# Patient Record
Sex: MALE | Race: BLACK OR AFRICAN AMERICAN | NOT HISPANIC OR LATINO | Employment: OTHER | URBAN - METROPOLITAN AREA
[De-identification: names, ages, dates, MRNs, and addresses within clinical notes are randomized per-mention and may not be internally consistent; named-entity substitution may affect disease eponyms.]

---

## 2017-01-20 ENCOUNTER — TELEPHONE (OUTPATIENT)
Dept: FAMILY MEDICINE | Facility: CLINIC | Age: 67
End: 2017-01-20

## 2017-01-20 DIAGNOSIS — Z91.89 PNEUMOCOCCAL VACCINATION INDICATED: Primary | ICD-10-CM

## 2017-01-20 NOTE — TELEPHONE ENCOUNTER
----- Message from Anamika Hernandez LPN sent at 1/20/2017 10:24 AM CST -----  Contact: self  Patient has not received any of the pneumonia vaccine.  ----- Message -----     From: Cinda Sorenson     Sent: 1/19/2017   8:30 AM       To: Angus YUAN Staff    Pt calling to schedule a pneumonia injection. Please call [phone].

## 2017-01-20 NOTE — TELEPHONE ENCOUNTER
Spoke with wife and she informed me that she will let patient know and have him call to schedule injection.

## 2017-01-26 ENCOUNTER — CLINICAL SUPPORT (OUTPATIENT)
Dept: FAMILY MEDICINE | Facility: CLINIC | Age: 67
End: 2017-01-26
Payer: MEDICARE

## 2017-01-26 VITALS — TEMPERATURE: 98 F

## 2017-01-26 DIAGNOSIS — Z23 NEED FOR STREPTOCOCCUS PNEUMONIAE VACCINATION: ICD-10-CM

## 2017-01-26 PROCEDURE — 99211 OFF/OP EST MAY X REQ PHY/QHP: CPT | Mod: PBBFAC,PN

## 2017-01-26 PROCEDURE — 90732 PPSV23 VACC 2 YRS+ SUBQ/IM: CPT | Mod: PBBFAC

## 2017-01-26 PROCEDURE — G0009 ADMIN PNEUMOCOCCAL VACCINE: HCPCS | Mod: PBBFAC

## 2017-01-26 PROCEDURE — 99999 PR PBB SHADOW E&M-EST. PATIENT-LVL I: CPT | Mod: PBBFAC,,,

## 2017-01-26 NOTE — PROGRESS NOTES
Patient received Pneumococcal 23 vaccine and tolerated it well. Patient received VIS information. Patient advise to wait 15 min for possible reactions.

## 2017-03-22 ENCOUNTER — TELEPHONE (OUTPATIENT)
Dept: FAMILY MEDICINE | Facility: CLINIC | Age: 67
End: 2017-03-22

## 2017-03-22 DIAGNOSIS — I10 ESSENTIAL HYPERTENSION: ICD-10-CM

## 2017-03-22 RX ORDER — AMLODIPINE BESYLATE 5 MG/1
10 TABLET ORAL DAILY
Qty: 180 TABLET | Refills: 3 | Status: SHIPPED | OUTPATIENT
Start: 2017-03-22 | End: 2017-04-24 | Stop reason: SDUPTHER

## 2017-03-22 NOTE — TELEPHONE ENCOUNTER
----- Message from Tiffanie Sharpe sent at 3/22/2017 10:25 AM CDT -----  Contact: Valorie-Kellen  Pt is requesting a refill for amlodipine (NORVASC) 5 MG tablet. [phone]

## 2017-03-23 RX ORDER — SUCRALFATE 1 G/1
1 TABLET ORAL 2 TIMES DAILY
Qty: 360 TABLET | Refills: 1 | Status: SHIPPED | OUTPATIENT
Start: 2017-03-23 | End: 2017-04-26 | Stop reason: SDUPTHER

## 2017-03-23 NOTE — TELEPHONE ENCOUNTER
----- Message from Beth Lin sent at 3/23/2017  9:47 AM CDT -----  Contact: Wife- Valorie  Patient need refill  Sent to [facility]     sucralfate (CARAFATE) 1 gram tablet    Please call patient at [phone]

## 2017-04-24 DIAGNOSIS — I10 ESSENTIAL HYPERTENSION: ICD-10-CM

## 2017-04-24 RX ORDER — OLMESARTAN MEDOXOMIL AND HYDROCHLOROTHIAZIDE 40/25 40; 25 MG/1; MG/1
1 TABLET ORAL DAILY
Qty: 90 TABLET | Refills: 1 | Status: SHIPPED | OUTPATIENT
Start: 2017-04-24 | End: 2017-04-26 | Stop reason: SDUPTHER

## 2017-04-24 RX ORDER — AMLODIPINE BESYLATE 5 MG/1
10 TABLET ORAL DAILY
Qty: 180 TABLET | Refills: 3 | Status: SHIPPED | OUTPATIENT
Start: 2017-04-24 | End: 2017-04-26 | Stop reason: SDUPTHER

## 2017-04-26 DIAGNOSIS — I10 ESSENTIAL HYPERTENSION: ICD-10-CM

## 2017-04-26 RX ORDER — AMLODIPINE BESYLATE 5 MG/1
10 TABLET ORAL DAILY
Qty: 180 TABLET | Refills: 3 | Status: SHIPPED | OUTPATIENT
Start: 2017-04-26 | End: 2018-06-15 | Stop reason: SDUPTHER

## 2017-04-26 RX ORDER — OLMESARTAN MEDOXOMIL AND HYDROCHLOROTHIAZIDE 40/25 40; 25 MG/1; MG/1
1 TABLET ORAL DAILY
Qty: 30 TABLET | Refills: 0 | OUTPATIENT
Start: 2017-04-26 | End: 2018-04-26

## 2017-04-26 RX ORDER — OLMESARTAN MEDOXOMIL AND HYDROCHLOROTHIAZIDE 40/25 40; 25 MG/1; MG/1
1 TABLET ORAL DAILY
Qty: 90 TABLET | Refills: 0 | Status: SHIPPED | OUTPATIENT
Start: 2017-04-26 | End: 2017-08-08 | Stop reason: SDUPTHER

## 2017-04-26 RX ORDER — SUCRALFATE 1 G/1
1 TABLET ORAL 4 TIMES DAILY
Qty: 360 TABLET | Refills: 1 | Status: SHIPPED | OUTPATIENT
Start: 2017-04-26 | End: 2018-03-23 | Stop reason: SDUPTHER

## 2017-04-26 RX ORDER — OLMESARTAN MEDOXOMIL AND HYDROCHLOROTHIAZIDE 40/25 40; 25 MG/1; MG/1
TABLET ORAL
Qty: 90 TABLET | Refills: 0 | Status: SHIPPED | OUTPATIENT
Start: 2017-04-26 | End: 2017-04-26 | Stop reason: SDUPTHER

## 2017-04-26 NOTE — TELEPHONE ENCOUNTER
----- Message from Rhianna Wilder sent at 4/26/2017  1:38 PM CDT -----  Contact: [phone] Valorie   Pt wife is requesting a call back in regards to the pt Please call  at your earliest convenience.  Thanks !

## 2017-04-26 NOTE — TELEPHONE ENCOUNTER
Patient is requesting that his refills go to [facility]. And would like Benicar HCT brand name.

## 2017-04-26 NOTE — TELEPHONE ENCOUNTER
----- Message from Rhianna Wilder sent at 4/26/2017  9:35 AM CDT -----  Contact: [phone] Valorie   Pt is needing his refill for the benicar sent to [facility] [place] [phone]  [place], [place]  [address] [facility] at a 30 day supply pt is out of this medication Please call pt at your earliest convenience.  Thanks !

## 2017-04-26 NOTE — TELEPHONE ENCOUNTER
Refill was sent to mail order pharmacy on 4/24/17 but pt is out; needs 30 day supply sent to local pharmacy

## 2017-05-10 ENCOUNTER — OFFICE VISIT (OUTPATIENT)
Dept: FAMILY MEDICINE | Facility: CLINIC | Age: 67
End: 2017-05-10
Payer: MEDICARE

## 2017-05-10 VITALS
HEART RATE: 64 BPM | SYSTOLIC BLOOD PRESSURE: 136 MMHG | TEMPERATURE: 98 F | DIASTOLIC BLOOD PRESSURE: 72 MMHG | HEIGHT: 65 IN | BODY MASS INDEX: 33.79 KG/M2 | WEIGHT: 202.81 LBS | OXYGEN SATURATION: 96 % | RESPIRATION RATE: 20 BRPM

## 2017-05-10 DIAGNOSIS — J30.2 SEASONAL ALLERGIC RHINITIS, UNSPECIFIED ALLERGIC RHINITIS TRIGGER: Primary | ICD-10-CM

## 2017-05-10 PROCEDURE — 99999 PR PBB SHADOW E&M-EST. PATIENT-LVL III: CPT | Mod: PBBFAC,,, | Performed by: FAMILY MEDICINE

## 2017-05-10 PROCEDURE — 99214 OFFICE O/P EST MOD 30 MIN: CPT | Mod: S$PBB,,, | Performed by: FAMILY MEDICINE

## 2017-05-10 PROCEDURE — 99213 OFFICE O/P EST LOW 20 MIN: CPT | Mod: PBBFAC,PN | Performed by: FAMILY MEDICINE

## 2017-05-10 RX ORDER — CODEINE PHOSPHATE AND GUAIFENESIN 10; 100 MG/5ML; MG/5ML
5 SOLUTION ORAL 3 TIMES DAILY PRN
Qty: 118 ML | Refills: 0 | Status: SHIPPED | OUTPATIENT
Start: 2017-05-10 | End: 2017-05-10 | Stop reason: SDUPTHER

## 2017-05-10 RX ORDER — CODEINE PHOSPHATE AND GUAIFENESIN 10; 100 MG/5ML; MG/5ML
5 SOLUTION ORAL 3 TIMES DAILY PRN
Qty: 118 ML | Refills: 0 | Status: SHIPPED | OUTPATIENT
Start: 2017-05-10 | End: 2017-05-20

## 2017-05-10 RX ORDER — MINERAL OIL
180 ENEMA (ML) RECTAL DAILY
Qty: 30 TABLET | Refills: 0 | Status: SHIPPED | OUTPATIENT
Start: 2017-05-10 | End: 2017-12-11 | Stop reason: ALTCHOICE

## 2017-05-10 RX ORDER — BENZONATATE 200 MG/1
200 CAPSULE ORAL 3 TIMES DAILY PRN
Qty: 20 CAPSULE | Refills: 0 | Status: SHIPPED | OUTPATIENT
Start: 2017-05-10 | End: 2017-05-20

## 2017-05-10 NOTE — PROGRESS NOTES
"Subjective:       Luciano Henriquez Jr. is a 67 y.o. male here for evaluation of a cough. Onset of symptoms was 3 days ago. Symptoms have been unchanged since that time. The cough is productive of clear sputum and is aggravated by dust, and he was also cleaning the bathroom late last week "that's my chore."  Associated symptoms include: postnasal drip. Patient does not have a history of asthma. Patient does have a history of environmental allergens - "sees that I'm getting symptoms more regularly, not just seasonal". Patient has not traveled recently. Patient does not have a history of smoking. Patient has not had a previous chest x-ray. Patient has not had a PPD done.  Sore throat went away, "started with a scratch in my throat on Friday"  Tried mucinex, and robitussin and still hasn't helped.    + sick contacts at work.   "she sits 50 ft away from me and she's coughing much worse than I am."    Review of Systems  Pertinent items are noted in HPI.      Objective:      Oxygen saturation 96% on room air  /72 (BP Location: Left arm, Patient Position: Sitting, BP Method: Manual)  Pulse 64  Temp 98.4 °F (36.9 °C) (Oral)   Resp 20  Ht 5' 5" (1.651 m)  Wt 92 kg (202 lb 13.2 oz)  SpO2 96%  BMI 33.75 kg/m2    General Appearance:    Alert, cooperative, no distress, appears stated age   Head:    Normocephalic, without obvious abnormality, atraumatic   Eyes:    PERRL, conjunctiva/corneas clear, EOM's intact   Ears:    Normal TM's and external ear canals, both ears   Nose:   Nares normal, septum midline, mucosa normal, +clear drainage    no sinus tenderness   Throat:   Lips, mucosa, and tongue normal; teeth and gums normal   Neck:   Supple, symmetrical, trachea midline, no adenopathy       Lungs:     Clear to auscultation bilaterally, respirations unlabored       Heart:    Regular rate and rhythm, S1 and S2 normal, no murmur, rub   or gallop                                         Assessment:      Allergic " Rhinitis      Plan:      Explained lack of efficacy of antibiotics in viral disease.  Antitussives per medication orders.  Avoid exposure to tobacco smoke and fumes.  Call if shortness of breath worsens, blood in sputum, change in character of cough, development of fever or chills, inability to maintain nutrition and hydration. Avoid exposure to tobacco smoke and fumes.  Trial of antihistamines.  Trial of steroid nasal spray.    - avoid caustic chemicals and tana areas.  - allegra daily for prevention.   F/u PRN

## 2017-05-10 NOTE — PATIENT INSTRUCTIONS
Over the counter remedies:   -Increase your water intake to 64-80 oz daily   -Nasal Saline spray (Over the counter [place] spray or Ayr) 2 sprays each nostril  -2-3 times a day for nasal congestion   -Tylenol 500 mg 2 tablets or Ibuprofen 200 mg 2 tablets every 4-6 hours as needed for fever, headaches, sore throat, ear pain, bodyaches, and/or  nasal/sinus inflammation   -Warm salt water gargles with 1/2 cup water and 1 tablespoon salt every 4 hours   -Warm tea with honey and lemon   -Follow up with PCP in 1 week if symptoms do not resolve

## 2017-05-10 NOTE — MR AVS SNAPSHOT
[facility]  [address]  [place] [address]  Phone: [phone]                  Lucaino Henriquez Jr.   5/10/2017 9:00 AM   Office Visit    Description:  Male : 1950   Provider:  Lissette Greco MD   Department:  [facility]           Reason for Visit     URI           Diagnoses this Visit        Comments    Viral URI with cough    -  Primary            To Do List           Goals (5 Years of Data)     None       These Medications        Disp Refills Start End    benzonatate (TESSALON) 200 MG capsule 20 capsule 0 5/10/2017 2017    Take 1 capsule (200 mg total) by mouth 3 (three) times daily as needed for Cough. - Oral    Pharmacy: [address] Ph #: [phone]       guaifenesin-codeine 100-10 mg/5 ml (CHERATUSSIN AC)  mg/5 mL syrup 118 mL 0 5/10/2017 2017    Take 5 mLs by mouth 3 (three) times daily as needed for Cough. - Oral    Pharmacy: [address] Ph #: [phone]       fexofenadine (ALLEGRA) 180 MG tablet 30 tablet 0 5/10/2017 5/10/2018    Take 1 tablet (180 mg total) by mouth once daily. - Oral    Pharmacy: [address] Ph #: [phone]         [facility]     [facility] On Call Nurse Care Line -  Assistance  Unless otherwise directed by your provider, please contact [facility], our nurse care line that is available for  assistance.     Registered nurses in the [facility] Center provide: appointment scheduling, clinical advisement, health education, and other advisory services.  Call: [phone] (toll free)               Medications           START taking these NEW medications        Refills    benzonatate (TESSALON) 200 MG capsule 0    Sig: Take 1 capsule (200 mg total) by mouth 3 (three) times daily as needed for Cough.    Class:  Normal    Route: Oral    guaifenesin-codeine 100-10 mg/5 ml (CHERATUSSIN AC)  mg/5 mL syrup 0    Sig: Take 5 mLs by mouth 3 (three) times daily as needed for Cough.    Class: Print    Route: Oral    fexofenadine (ALLEGRA) 180 MG tablet 0    Sig: Take 1 tablet (180 mg total) by mouth once daily.    Class: Normal    Route: Oral           Verify that the below list of medications is an accurate representation of the medications you are currently taking.  If none reported, the list may be blank. If incorrect, please contact your healthcare provider. Carry this list with you in case of emergency.           Current Medications     amlodipine (NORVASC) 5 MG tablet Take 2 tablets (10 mg total) by mouth once daily.    ascorbic acid (VITAMIN C) 500 MG tablet Take 500 mg by mouth 2 (two) times daily.    aspirin (ECOTRIN) 81 MG EC tablet Take 81 mg by mouth once daily.    azelastine-fluticasone (DYMISTA) 137-50 mcg/spray Spry nassal spray 1 spray by Each Nare route 2 (two) times daily.    fish oil-omega-3 fatty acids 300-1,000 mg capsule Take 2 g by mouth once daily.    hydrocodone-acetaminophen 5-325mg (NORCO) 5-325 mg per tablet Take 1-2 tablets by mouth every 4 (four) hours as needed for Pain.    multivitamin capsule Take 1 capsule by mouth once daily.    olmesartan-hydrochlorothiazide (BENICAR HCT) 40-25 mg per tablet Take 1 tablet by mouth once daily.    rosuvastatin (CRESTOR) 10 MG tablet TAKE 1 TABLET DAILY    sucralfate (CARAFATE) 1 gram tablet Take 1 tablet (1 g total) by mouth 4 (four) times daily.    benzonatate (TESSALON) 200 MG capsule Take 1 capsule (200 mg total) by mouth 3 (three) times daily as needed for Cough.    fexofenadine (ALLEGRA) 180 MG tablet Take 1 tablet (180 mg total) by mouth once daily.    guaifenesin-codeine 100-10 mg/5 ml (CHERATUSSIN AC)  mg/5 mL syrup Take 5 mLs by mouth 3 (three) times daily as needed for Cough.           Clinical Reference Information           Your Vitals Were   "   BP Pulse Temp Resp Height Weight    136/72 (BP Location: Left arm, Patient Position: Sitting, BP Method: Manual) 64 98.4 °F (36.9 °C) (Oral) 20 5' 5" (1.651 m) 92 kg (202 lb 13.2 oz)    SpO2 BMI             96% 33.75 kg/m2         Blood Pressure          Most Recent Value    BP  136/72      Allergies as of 5/10/2017     No Known Allergies      Immunizations Administered on Date of Encounter - 5/10/2017     None      Instructions     Over the counter remedies:   -Increase your water intake to 64-80 oz daily   -Nasal Saline spray (Over the counter [place] spray or Ayr) 2 sprays each nostril  -2-3 times a day for nasal congestion   -Tylenol 500 mg 2 tablets or Ibuprofen 200 mg 2 tablets every 4-6 hours as needed for fever, headaches, sore throat, ear pain, bodyaches, and/or  nasal/sinus inflammation   -Warm salt water gargles with 1/2 cup water and 1 tablespoon salt every 4 hours   -Warm tea with honey and lemon   -Follow up with PCP in 1 week if symptoms do not resolve         Language Assistance Services     ATTENTION: Language assistance services are available, free of charge. Please call [phone].      ATENCIÓN: Si habla doron, tiene a ware disposición servicios gratuitos de asistencia lingüística. Llame al [phone].     [facility] Ý: N?u b?n nói Ti?ng Vi?t, có các d?ch v? h? tr? ngôn ng? mi?n phí dành cho b?n. G?i s? [phone].         [facility] complies with applicable Federal civil rights laws and does not discriminate on the basis of race, color, national origin, age, disability, or sex.        "

## 2017-05-11 ENCOUNTER — TELEPHONE (OUTPATIENT)
Dept: FAMILY MEDICINE | Facility: CLINIC | Age: 67
End: 2017-05-11

## 2017-05-11 ENCOUNTER — PATIENT MESSAGE (OUTPATIENT)
Dept: FAMILY MEDICINE | Facility: CLINIC | Age: 67
End: 2017-05-11

## 2017-08-04 RX ORDER — ROSUVASTATIN CALCIUM 10 MG/1
TABLET, COATED ORAL
Qty: 90 TABLET | Refills: 3 | Status: SHIPPED | OUTPATIENT
Start: 2017-08-04 | End: 2018-08-22 | Stop reason: SDUPTHER

## 2017-08-08 ENCOUNTER — PATIENT MESSAGE (OUTPATIENT)
Dept: FAMILY MEDICINE | Facility: CLINIC | Age: 67
End: 2017-08-08

## 2017-08-08 DIAGNOSIS — I10 ESSENTIAL HYPERTENSION: ICD-10-CM

## 2017-08-08 RX ORDER — OLMESARTAN MEDOXOMIL AND HYDROCHLOROTHIAZIDE 40/25 40; 25 MG/1; MG/1
1 TABLET ORAL DAILY
Qty: 90 TABLET | Refills: 0 | Status: SHIPPED | OUTPATIENT
Start: 2017-08-08 | End: 2017-08-11 | Stop reason: SDUPTHER

## 2017-08-08 NOTE — TELEPHONE ENCOUNTER
----- Message from Yovani Ferguson sent at 8/8/2017  4:56 PM CDT -----  Contact: Valorie (wife)  X_ 1st Request  _ 2nd Request  _ 3rd Request    Who: Valorie (wife)    Why: Patient wife would like to speak with staff in regards to the denial of rosuvastatin (CRESTOR) 10 MG tablet by [facility]    What Number to Call Back: [phone]    When to Expect a call back: (Before the end of the day)  -- if call after 3:00 call back will be tomorrow.

## 2017-08-11 DIAGNOSIS — I10 ESSENTIAL HYPERTENSION: ICD-10-CM

## 2017-08-11 RX ORDER — OLMESARTAN MEDOXOMIL AND HYDROCHLOROTHIAZIDE 40/25 40; 25 MG/1; MG/1
1 TABLET ORAL DAILY
Qty: 90 TABLET | Refills: 0 | Status: SHIPPED | OUTPATIENT
Start: 2017-08-11 | End: 2017-08-16 | Stop reason: ALTCHOICE

## 2017-08-11 NOTE — TELEPHONE ENCOUNTER
----- Message from Brianda James sent at 8/11/2017  2:00 PM CDT -----  Contact: spouse - Valorie   Calling regarding -- olmesartan-hydrochlorothiazide (BENICAR HCT) 40-25 mg per tablet  , it was suppose to be generic form ,due to cost # 90 ,   [facility]   [phone]    pts # [phone] CH

## 2017-08-16 ENCOUNTER — TELEPHONE (OUTPATIENT)
Dept: FAMILY MEDICINE | Facility: CLINIC | Age: 67
End: 2017-08-16

## 2017-08-16 RX ORDER — OLMESARTAN MEDOXOMIL 40 MG/1
40 TABLET ORAL DAILY
Qty: 90 TABLET | Refills: 3 | Status: SHIPPED | OUTPATIENT
Start: 2017-08-16 | End: 2018-08-08 | Stop reason: SDUPTHER

## 2017-08-16 RX ORDER — HYDROCHLOROTHIAZIDE 25 MG/1
25 TABLET ORAL DAILY
Qty: 90 TABLET | Refills: 3 | Status: SHIPPED | OUTPATIENT
Start: 2017-08-16 | End: 2018-08-07 | Stop reason: SDUPTHER

## 2017-08-17 ENCOUNTER — OFFICE VISIT (OUTPATIENT)
Dept: FAMILY MEDICINE | Facility: CLINIC | Age: 67
End: 2017-08-17
Payer: MEDICARE

## 2017-08-17 ENCOUNTER — TELEPHONE (OUTPATIENT)
Dept: ADMINISTRATIVE | Facility: HOSPITAL | Age: 67
End: 2017-08-17

## 2017-08-17 VITALS
SYSTOLIC BLOOD PRESSURE: 138 MMHG | HEART RATE: 68 BPM | BODY MASS INDEX: 33.32 KG/M2 | OXYGEN SATURATION: 96 % | DIASTOLIC BLOOD PRESSURE: 72 MMHG | RESPIRATION RATE: 20 BRPM | HEIGHT: 65 IN | TEMPERATURE: 98 F | WEIGHT: 200 LBS

## 2017-08-17 DIAGNOSIS — M17.12 ARTHRITIS OF LEFT KNEE: Primary | ICD-10-CM

## 2017-08-17 PROBLEM — M17.0 PRIMARY OSTEOARTHRITIS OF BOTH KNEES: Status: ACTIVE | Noted: 2017-08-17

## 2017-08-17 PROCEDURE — 20610 DRAIN/INJ JOINT/BURSA W/O US: CPT | Mod: PBBFAC,PN | Performed by: FAMILY MEDICINE

## 2017-08-17 PROCEDURE — 99499 UNLISTED E&M SERVICE: CPT | Mod: S$PBB,,, | Performed by: FAMILY MEDICINE

## 2017-08-17 PROCEDURE — 99213 OFFICE O/P EST LOW 20 MIN: CPT | Mod: PBBFAC,PN | Performed by: FAMILY MEDICINE

## 2017-08-17 PROCEDURE — 99999 PR PBB SHADOW E&M-EST. PATIENT-LVL III: CPT | Mod: PBBFAC,,, | Performed by: FAMILY MEDICINE

## 2017-08-17 RX ORDER — TRIAMCINOLONE ACETONIDE 40 MG/ML
40 INJECTION, SUSPENSION INTRA-ARTICULAR; INTRAMUSCULAR
Status: DISCONTINUED | OUTPATIENT
Start: 2017-08-17 | End: 2017-08-17 | Stop reason: HOSPADM

## 2017-08-17 RX ADMIN — TRIAMCINOLONE ACETONIDE 40 MG: 40 INJECTION, SUSPENSION INTRA-ARTICULAR; INTRAMUSCULAR at 03:08

## 2017-08-17 NOTE — PROCEDURES
"Large Joint Aspiration/Injection  Date/Time: 8/17/2017 3:22 PM  Performed by: JOSH HUSSEIN  Authorized by: JOSH HUSSEIN     Indications:  Pain  Procedure site marked: Yes    Timeout: Prior to procedure the correct patient, procedure, and site was verified      Location:  Knee  Site:  L knee  Prep: Patient was prepped and draped in usual sterile fashion    Ultrasonic Guidance for needle placement: No  Needle size:  22 G  Approach:  Anterolateral  Medications:  40 mg triamcinolone acetonide 40 mg/mL  Aspirate:  Clear  Patient tolerance:  Patient tolerated the procedure well with no immediate complications    Additional Comments:  L knee - pain has been manageable with advil or tylenol for years but last 2 weeks it's been worse. He's been regularly exercising on treadmill but he started getting pain in his L knee on the lateral edge.  It was mildly swollen, he started icing it, and started putting [employer] which worked temporarily.  The pain gets worse if he's sitting for 5 minutes "seems like it stiffens up" and hard to take his first few steps.  It's been slowing him down.  When he gets up at night, he has to wait for a minute before he starts walking.  He did injure his L knee about 30 years ago playing volleyball, and he had arthroscopic knee surgery 7-10 years ago.  He has never had a knee injection before for arthritis.         "

## 2017-09-06 NOTE — TELEPHONE ENCOUNTER
Called for status of eye exam report.  Per Claire waiting on Dr. Singer Gardner for signature and release.

## 2017-09-13 NOTE — TELEPHONE ENCOUNTER
Received eye exam reports from Singer Kendra MD.  Updated health maintenance and sent to scanning.

## 2017-09-26 ENCOUNTER — OFFICE VISIT (OUTPATIENT)
Dept: FAMILY MEDICINE | Facility: CLINIC | Age: 67
End: 2017-09-26
Payer: MEDICARE

## 2017-09-26 VITALS
OXYGEN SATURATION: 95 % | SYSTOLIC BLOOD PRESSURE: 144 MMHG | WEIGHT: 205.94 LBS | TEMPERATURE: 99 F | DIASTOLIC BLOOD PRESSURE: 82 MMHG | RESPIRATION RATE: 20 BRPM | HEIGHT: 65 IN | BODY MASS INDEX: 34.31 KG/M2 | HEART RATE: 90 BPM

## 2017-09-26 DIAGNOSIS — Z23 FLU VACCINE NEED: ICD-10-CM

## 2017-09-26 DIAGNOSIS — M70.22 OLECRANON BURSITIS OF LEFT ELBOW: Primary | ICD-10-CM

## 2017-09-26 PROCEDURE — 3077F SYST BP >= 140 MM HG: CPT | Mod: ,,, | Performed by: FAMILY MEDICINE

## 2017-09-26 PROCEDURE — 1126F AMNT PAIN NOTED NONE PRSNT: CPT | Mod: ,,, | Performed by: FAMILY MEDICINE

## 2017-09-26 PROCEDURE — 99213 OFFICE O/P EST LOW 20 MIN: CPT | Mod: S$PBB,,, | Performed by: FAMILY MEDICINE

## 2017-09-26 PROCEDURE — 1159F MED LIST DOCD IN RCRD: CPT | Mod: ,,, | Performed by: FAMILY MEDICINE

## 2017-09-26 PROCEDURE — 99213 OFFICE O/P EST LOW 20 MIN: CPT | Mod: PBBFAC,PN | Performed by: FAMILY MEDICINE

## 2017-09-26 PROCEDURE — 99999 PR PBB SHADOW E&M-EST. PATIENT-LVL III: CPT | Mod: PBBFAC,,, | Performed by: FAMILY MEDICINE

## 2017-09-26 PROCEDURE — G0008 ADMIN INFLUENZA VIRUS VAC: HCPCS | Mod: PBBFAC,PN

## 2017-09-26 PROCEDURE — 3079F DIAST BP 80-89 MM HG: CPT | Mod: ,,, | Performed by: FAMILY MEDICINE

## 2017-09-26 RX ORDER — AZELASTINE HYDROCHLORIDE, FLUTICASONE PROPIONATE 137; 50 UG/1; UG/1
1 SPRAY, METERED NASAL 2 TIMES DAILY
Qty: 1 BOTTLE | Refills: 0 | Status: SHIPPED | OUTPATIENT
Start: 2017-09-26 | End: 2017-12-18 | Stop reason: SDUPTHER

## 2017-09-26 RX ORDER — GLUCOSAMINE/CHONDRO SU A 500-400 MG
1 TABLET ORAL 3 TIMES DAILY
COMMUNITY
End: 2018-10-11

## 2017-09-26 NOTE — PROGRESS NOTES
Routine Office Visit    Patient Name: Luciano Henriquez Jr.    : 1950  MRN: [id number]    Subjective:  Luciano is a 67 y.o. male who presents today for:    1.   L elbow swelling - since 1 month.  Non tender.  He hasn't had this problem before.  He wonders if it will eventually go away. He hasn't used any brace before.  He has been icing it pretty regularly.    2.   He would like a flu vaccine today,     Past Medical History  Past Medical History:   Diagnosis Date    Allergy     Hyperlipidemia     Hypertension     Reflux esophagitis        Past Surgical History  Past Surgical History:   Procedure Laterality Date    arthroscopic on left knee      CARPAL TUNNEL RELEASE      right hand only    deviated septum repair      VASECTOMY         Family History  Family History   Problem Relation Age of Onset    Arthritis Mother     Hyperlipidemia Mother     Hypertension Mother     Cancer Father      prostate    Stroke Father        Social History  Social History     Social History    Marital status:      Spouse name: N/A    Number of children: N/A    Years of education: N/A     Occupational History    Not on file.     Social History Main Topics    Smoking status: Never Smoker    Smokeless tobacco: Never Used      Comment: .  One child.  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      Alcohol use 8.4 oz/week     14 Glasses of wine per week      Comment: Drinks 2 glasses of wine per night    Drug use: No    Sexual activity: Yes     Partners: Female     Other Topics Concern    Not on file     Social History Narrative    No narrative on file       Current Medications  Current Outpatient Prescriptions on File Prior to Visit   Medication Sig Dispense Refill    amlodipine (NORVASC) 5 MG tablet Take 2 tablets (10 mg total) by mouth once daily. 180 tablet 3    ascorbic acid (VITAMIN C) 500 MG tablet Take 500 mg by mouth 2 (two) times daily.      aspirin (ECOTRIN) 81 MG EC tablet Take 81 mg by mouth once  "daily.      azelastine-fluticasone (DYMISTA) 137-50 mcg/spray Spry nassal spray 1 spray by Each Nare route 2 (two) times daily. 1 Bottle 0    fexofenadine (ALLEGRA) 180 MG tablet Take 1 tablet (180 mg total) by mouth once daily. 30 tablet 0    fish oil-omega-3 fatty acids 300-1,000 mg capsule Take 2 g by mouth once daily.      hydrochlorothiazide (HYDRODIURIL) 25 MG tablet Take 1 tablet (25 mg total) by mouth once daily. 90 tablet 3    hydrocodone-acetaminophen 5-325mg (NORCO) 5-325 mg per tablet Take 1-2 tablets by mouth every 4 (four) hours as needed for Pain. 40 tablet 0    multivitamin capsule Take 1 capsule by mouth once daily.      olmesartan (BENICAR) 40 MG tablet Take 1 tablet (40 mg total) by mouth once daily. 90 tablet 3    rosuvastatin (CRESTOR) 10 MG tablet TAKE 1 TABLET DAILY 90 tablet 3    sucralfate (CARAFATE) 1 gram tablet Take 1 tablet (1 g total) by mouth 4 (four) times daily. 360 tablet 1     No current facility-administered medications on file prior to visit.        Allergies   Review of patient's allergies indicates:  No Known Allergies    Review of Systems (Pertinent positives)  Constititutional: Weight loss, excess fatigue, chills, fever, night sweats, weakness, loss of appetite  Ears: Earache, ringing in ears, discharge, hearing loss, hearing aid, popping, infection Nose: stuffy nose, mouth breathing, post-nasal drip,   Lungs: Cough, sputum, wheeze, frequent URI, SOA, Asthma  Heart: Chest pain, angina, palpitations, extra heart beats  Stomach/Intestine: Heartburn, Nausea, vomiting, diarrhea, indigestion, bloating, constipation  Bones/Muscles/Joints: Joint pain, deformities, back pain, swelling, stiffness    BP (!) 144/82 (BP Location: Left arm, Patient Position: Sitting, BP Method: Medium (Manual))   Pulse 90   Temp 98.6 °F (37 °C) (Oral)   Resp 20   Ht 5' 5" (1.651 m)   Wt 93.4 kg (205 lb 14.6 oz)   SpO2 95%   BMI 34.27 kg/m²     GENERAL APPEARANCE: in no apparent distress " and well developed and well nourished  HEENT: PERRLA, EOMI, Sclera clear, anicteric, Oropharynx clear, no lesions, Neck supple with midline trachea  NECK: normal, supple, no adenopathy, thyroid normal in size  RESPIRATORY: appears well, vitals normal, no respiratory distress, acyanotic, normal RR, chest clear, no wheezing, crepitations, rhonchi, normal symmetric air entry  HEART: regular rate and rhythm, S1, S2 normal, no murmur, click, rub or gallop.    NEUROLOGIC: normal without focal findings, CN II-XII are intact.    Extremities: warm/well perfused.  No abnormal hair patterns.  No clubbing, cyanosis.  +L golf ball sized, soft fluid collection at olecranon  SKIN: no rashes, no wounds, no other lesions  PSYCH: Alert, oriented x 3, thought content appropriate, speech normal, pleasant and cooperative, good eye contact, well groomed, recall good, concentration/judgement good and apparently average intelligence.    Assessment/Plan:  Luciano Henriquez Swapna is a 67 y.o. male who presents today for :    Luciano was seen today for joint swelling.    Diagnoses and all orders for this visit:    Olecranon bursitis of left elbow     - handout given.  Conservative measures.       - if he has worsening symptoms or pain he can see orthopedics.      Flu vaccine need  -     Influenza - High Dose (65+) (PF) (IM)    F/u for annual

## 2017-12-11 ENCOUNTER — OFFICE VISIT (OUTPATIENT)
Dept: FAMILY MEDICINE | Facility: CLINIC | Age: 67
End: 2017-12-11
Payer: MEDICARE

## 2017-12-11 VITALS
SYSTOLIC BLOOD PRESSURE: 128 MMHG | BODY MASS INDEX: 34.68 KG/M2 | TEMPERATURE: 99 F | OXYGEN SATURATION: 93 % | WEIGHT: 208.13 LBS | HEIGHT: 65 IN | DIASTOLIC BLOOD PRESSURE: 80 MMHG | HEART RATE: 79 BPM

## 2017-12-11 DIAGNOSIS — J30.2 CHRONIC SEASONAL ALLERGIC RHINITIS, UNSPECIFIED TRIGGER: ICD-10-CM

## 2017-12-11 DIAGNOSIS — K29.70 GASTRITIS, PRESENCE OF BLEEDING UNSPECIFIED, UNSPECIFIED CHRONICITY, UNSPECIFIED GASTRITIS TYPE: ICD-10-CM

## 2017-12-11 DIAGNOSIS — M17.0 PRIMARY OSTEOARTHRITIS OF BOTH KNEES: ICD-10-CM

## 2017-12-11 DIAGNOSIS — E78.5 HYPERLIPIDEMIA, UNSPECIFIED HYPERLIPIDEMIA TYPE: ICD-10-CM

## 2017-12-11 DIAGNOSIS — R15.2 FECAL URGENCY: ICD-10-CM

## 2017-12-11 DIAGNOSIS — I10 ESSENTIAL HYPERTENSION: Primary | ICD-10-CM

## 2017-12-11 PROCEDURE — 99999 PR PBB SHADOW E&M-EST. PATIENT-LVL III: CPT | Mod: PBBFAC,,, | Performed by: FAMILY MEDICINE

## 2017-12-11 PROCEDURE — 99214 OFFICE O/P EST MOD 30 MIN: CPT | Mod: S$PBB,,, | Performed by: FAMILY MEDICINE

## 2017-12-11 PROCEDURE — 99213 OFFICE O/P EST LOW 20 MIN: CPT | Mod: PBBFAC,PN | Performed by: FAMILY MEDICINE

## 2017-12-11 NOTE — PROGRESS NOTES
"Chief Complaint   Patient presents with    Establish Care    Abdominal Pain       HPI    Luciano Henriquez Jr. is 67 y.o. male. The primary encounter diagnosis was Essential hypertension. Diagnoses of Hyperlipidemia, unspecified hyperlipidemia type, Gastritis, presence of bleeding unspecified, unspecified chronicity, unspecified gastritis type, Primary osteoarthritis of both knees, Chronic seasonal allergic rhinitis, unspecified trigger, and Fecal urgency were also pertinent to this visit.    67 year old male with HTN, HLD, Gastritis, and Seasonal Allergies comes to clinic to establish care and discuss bowel issues.  Patient reports that he is complaint with all of his medications.  He reports his abdominal issues have been ongoing for about 3-4 weeks.  He reports a feeling of fecal urgency before and after bowel movements.  He has had lower abdominal pain worse in the morning that resolved by late afternoon.  OTC therapies he has tried include Yogurt, Imodium and Mylanta.  He also reports decreasing intake of his coffee.  Patient reports a history of hemorrhoids.  He reports most bothersome symptom is "moistness" in the rectal area but does not appear to be rectal leakage.    Review of Systems   Constitutional: Negative for activity change and fever.   Respiratory: Negative for shortness of breath.    Cardiovascular: Negative for chest pain.   Gastrointestinal: Positive for abdominal pain (mild lower abdominal pain) and diarrhea. Negative for constipation, nausea and vomiting.   Genitourinary: Negative for dysuria, frequency and hematuria.   Musculoskeletal: Negative for arthralgias, gait problem and myalgias.   Neurological: Negative for headaches.   Psychiatric/Behavioral: Negative for suicidal ideas.           Current Outpatient Prescriptions:     amlodipine (NORVASC) 5 MG tablet, Take 2 tablets (10 mg total) by mouth once daily., Disp: 180 tablet, Rfl: 3    ascorbic acid (VITAMIN C) 500 MG tablet, Take " "500 mg by mouth 2 (two) times daily., Disp: , Rfl:     aspirin (ECOTRIN) 81 MG EC tablet, Take 81 mg by mouth once daily., Disp: , Rfl:     azelastine-fluticasone (DYMISTA) 137-50 mcg/spray Spry nassal spray, 1 spray by Each Nare route 2 (two) times daily., Disp: 1 Bottle, Rfl: 0    fish oil-omega-3 fatty acids 300-1,000 mg capsule, Take 2 g by mouth once daily., Disp: , Rfl:     hydrochlorothiazide (HYDRODIURIL) 25 MG tablet, Take 1 tablet (25 mg total) by mouth once daily., Disp: 90 tablet, Rfl: 3    multivitamin capsule, Take 1 capsule by mouth once daily., Disp: , Rfl:     olmesartan (BENICAR) 40 MG tablet, Take 1 tablet (40 mg total) by mouth once daily., Disp: 90 tablet, Rfl: 3    rosuvastatin (CRESTOR) 10 MG tablet, TAKE 1 TABLET DAILY, Disp: 90 tablet, Rfl: 3    sucralfate (CARAFATE) 1 gram tablet, Take 1 tablet (1 g total) by mouth 4 (four) times daily., Disp: 360 tablet, Rfl: 1    glucosamine-chondroitin 500-400 mg tablet, Take 1 tablet by mouth 3 (three) times daily., Disp: , Rfl:       Blood pressure 128/80, pulse 79, temperature 98.6 °F (37 °C), temperature source Oral, height 5' 5" (1.651 m), weight 94.4 kg (208 lb 1.8 oz), SpO2 (!) 93 %.    Physical Exam   Constitutional: Vital signs are normal. He appears well-developed.   HENT:   Mouth/Throat: Normal dentition.   Neck: Trachea normal. No thyromegaly present.   Cardiovascular: Normal rate, regular rhythm and intact distal pulses.    No murmur heard.  Pulmonary/Chest: Effort normal. He has no decreased breath sounds. He has no wheezes. He exhibits no deformity.   Musculoskeletal:   Normal gait. No decreased range of motion of major joints.   Neurological: He is not disoriented.   Skin: Skin is intact. Capillary refill takes less than 2 seconds.   Psychiatric: His speech is normal and behavior is normal. His mood appears not anxious. He does not exhibit a depressed mood.       No visits with results within 3 Month(s) from this visit.   Latest " known visit with results is:   Hospital Outpatient Visit on 09/06/2016   Component Date Value Ref Range Status    WBC 09/06/2016 4.15  3.90 - 12.70 K/uL Final    RBC 09/06/2016 4.72  4.60 - 6.20 M/uL Final    Hemoglobin 09/06/2016 13.6* 14.0 - 18.0 g/dL Final    Hematocrit 09/06/2016 40.4  40.0 - 54.0 % Final    MCV 09/06/2016 86  82 - 98 fL Final    MCH 09/06/2016 28.8  27.0 - 31.0 pg Final    MCHC 09/06/2016 33.7  32.0 - 36.0 % Final    RDW 09/06/2016 14.9* 11.5 - 14.5 % Final    Platelets 09/06/2016 241  150 - 350 K/uL Final    MPV 09/06/2016 9.7  9.2 - 12.9 fL Final    Gran # 09/06/2016 1.7* 1.8 - 7.7 K/uL Final    Lymph # 09/06/2016 1.7  1.0 - 4.8 K/uL Final    Mono # 09/06/2016 0.6  0.3 - 1.0 K/uL Final    Eos # 09/06/2016 0.1  0.0 - 0.5 K/uL Final    Baso # 09/06/2016 0.02  0.00 - 0.20 K/uL Final    Gran% 09/06/2016 41.9  38.0 - 73.0 % Final    Lymph% 09/06/2016 41.2  18.0 - 48.0 % Final    Mono% 09/06/2016 13.7  4.0 - 15.0 % Final    Eosinophil% 09/06/2016 2.7  0.0 - 8.0 % Final    Basophil% 09/06/2016 0.5  0.0 - 1.9 % Final    Differential Method 09/06/2016 Automated   Final    Sodium 09/06/2016 142  136 - 145 mmol/L Final    Potassium 09/06/2016 4.1  3.5 - 5.1 mmol/L Final    Chloride 09/06/2016 104  95 - 110 mmol/L Final    CO2 09/06/2016 28  23 - 29 mmol/L Final    Glucose 09/06/2016 106  70 - 110 mg/dL Final    BUN, Bld 09/06/2016 24* 8 - 23 mg/dL Final    Creatinine 09/06/2016 1.4  0.5 - 1.4 mg/dL Final    Calcium 09/06/2016 9.7  8.7 - 10.5 mg/dL Final    Anion Gap 09/06/2016 10  8 - 16 mmol/L Final    eGFR if African American 09/06/2016 >60  >60 mL/min/1.73 m^2 Final    eGFR if non African American 09/06/2016 52* >60 mL/min/1.73 m^2 Final   ]    Assessment:    1. Essential hypertension    2. Hyperlipidemia, unspecified hyperlipidemia type    3. Gastritis, presence of bleeding unspecified, unspecified chronicity, unspecified gastritis type    4. Primary osteoarthritis of  both knees    5. Chronic seasonal allergic rhinitis, unspecified trigger    6. Fecal urgency          Luciano was seen today for establish care and abdominal pain.    Diagnoses and all orders for this visit:    Essential hypertension   -Stable. Continue lifestyle modifications. No refills needed at this time.    Hyperlipidemia, unspecified hyperlipidemia type   -Stable.  Recheck lipid panel at annual visit.    Gastritis, presence of bleeding unspecified, unspecified chronicity, unspecified gastritis type   -Stable.  Avoid trigger foods including coffee.    Primary osteoarthritis of both knees   -Improved. Continue exercise modifications.    Chronic seasonal allergic rhinitis, unspecified trigger   -Stable. Continue use of nasal antihistamine.    Fecal urgency    -New problem.  Suspect internal hemmorhoids.  Will try lifestyle modifications including stopping Mylanta, add fiber to bulk stool, and use Imodium as needed for diarrhea only.   -If no improvement refer to GI for evaluation.  Follow up issue at next visit.      FOLLOW UP: Return in about 4 weeks (around 1/8/2018) for Physical/GI.    Answers for HPI/ROS submitted by the patient on 12/10/2017   Abdominal pain  Chronicity: recurrent  Onset: 1 to 4 weeks ago  Onset quality: gradual  Frequency: daily  Episode duration: 4 hours  Progression since onset: gradually improving  Pain location: suprapubic region  Pain - numeric: 5/10  Radiates to: does not radiate  anorexia: No  belching: Yes  flatus: No  hematochezia: No  melena: No  weight loss: No  Aggravated by: eating  Relieved by: liquids  Diagnostic workup: surgery  Pain severity: mild  Treatments tried: antacids  Improvement on treatment: mild  abdominal surgery: Yes  colon cancer: No  Crohn's disease: No  gallstones: No  GERD: Yes  irritable bowel syndrome: No  kidney stones: No  pancreatitis: No  PUD: No  ulcerative colitis: No  UTI: No

## 2017-12-11 NOTE — PATIENT INSTRUCTIONS
High-Fiber Diet  Fiber is in fruits, vegetables, cereals, and grains. Fiber passes through your body undigested. A high-fiber diet helps food move through your intestinal tract. The added bulk is helpful in preventing constipation. In people with diverticulosis, fiber helps clean out the pouches along the colon wall. It also prevents new pouches from forming. A high-fiber diet reduces the risk of colon cancer. It also lowers blood cholesterol and prevents high blood sugar in people with diabetes.    The fiber-rich foods listed below should be part of your diet. If you are not used to high-fiber foods, start with 1 or 2 foods from this list. Every 3 to 4 days add a new one to your diet. Do this until you are eating 4 high-fiber foods per day. This should give you 20 to 35 grams of fiber a day. It is also important to drink a lot of water when you are on this diet. You should have 6 to 8 glasses of water a day. Water makes the fiber swell and increases the benefit.  Foods high in dietary fiber  The following foods are high in dietary fiber:  · Breads. Breads made with 100% whole-wheat flour; gar[place], wheat, or rye crackers; whole-grain tortillas, bran muffins.  · Cereals. Whole-grain and bran cereals with bran (shredded wheat, wheat flakes, raisin bran, corn bran); oatmeal, rolled oats, granola, and brown rice.  · Fruits. Fresh fruits and their edible skins (pears, prunes, raisins, berries, apples, and apricots); bananas, citrus fruit, mangoes, pineapple; and prune juice.  · Nuts. Any nuts and seeds.  · Vegetables. Best served raw or lightly cooked. All types, especially: green peas, celery, eggplant, potatoes, spinach, broccoli, [place] sprouts, winter squash, carrots, cauliflower, soybeans, lentils, and fresh and dried beans of all kinds.  · Other. Popcorn, any spices.  Date Last Reviewed: 8/1/2016  © 8402-9609 [employer]. [address] [place] [address], [place] [address]. All rights reserved. This  information is not intended as a substitute for professional medical care. Always follow your healthcare professional's instructions.

## 2017-12-12 PROBLEM — R15.2 FECAL URGENCY: Status: ACTIVE | Noted: 2017-12-12

## 2017-12-18 NOTE — TELEPHONE ENCOUNTER
----- Message from Lurdes Pardo sent at 12/18/2017  3:12 PM CST -----  Contact: self  [phone]  Pt is requesting a refill on Dymista. Pls call abby [phone]. Thanks.....Jennifer

## 2017-12-19 RX ORDER — AZELASTINE HYDROCHLORIDE, FLUTICASONE PROPIONATE 137; 50 UG/1; UG/1
1 SPRAY, METERED NASAL 2 TIMES DAILY
Qty: 1 BOTTLE | Refills: 0 | Status: SHIPPED | OUTPATIENT
Start: 2017-12-19 | End: 2018-03-19 | Stop reason: SDUPTHER

## 2018-01-24 ENCOUNTER — TELEPHONE (OUTPATIENT)
Dept: FAMILY MEDICINE | Facility: CLINIC | Age: 68
End: 2018-01-24

## 2018-01-24 DIAGNOSIS — E78.2 MIXED HYPERLIPIDEMIA: ICD-10-CM

## 2018-01-24 DIAGNOSIS — I10 ESSENTIAL HYPERTENSION: Primary | ICD-10-CM

## 2018-01-24 DIAGNOSIS — Z12.5 SCREENING FOR PROSTATE CANCER: ICD-10-CM

## 2018-01-24 NOTE — TELEPHONE ENCOUNTER
----- Message from Judy Cardona sent at 1/24/2018 11:35 AM CST -----  Contact: self  Patient would like blood work orders placed into the system so patient can have blood work done before office visit 1/29/18.      Patient can be reached at [phone].      Thanks,

## 2018-01-30 ENCOUNTER — LAB VISIT (OUTPATIENT)
Dept: LAB | Facility: HOSPITAL | Age: 68
End: 2018-01-30
Attending: FAMILY MEDICINE
Payer: MEDICARE

## 2018-01-30 DIAGNOSIS — E78.2 MIXED HYPERLIPIDEMIA: ICD-10-CM

## 2018-01-30 DIAGNOSIS — I10 ESSENTIAL HYPERTENSION: ICD-10-CM

## 2018-01-30 DIAGNOSIS — Z12.5 SCREENING FOR PROSTATE CANCER: ICD-10-CM

## 2018-01-30 LAB
ALBUMIN SERPL BCP-MCNC: 4 G/DL
ALP SERPL-CCNC: 50 U/L
ALT SERPL W/O P-5'-P-CCNC: 32 U/L
ANION GAP SERPL CALC-SCNC: 7 MMOL/L
AST SERPL-CCNC: 26 U/L
BILIRUB SERPL-MCNC: 0.4 MG/DL
BUN SERPL-MCNC: 21 MG/DL
CALCIUM SERPL-MCNC: 9.6 MG/DL
CHLORIDE SERPL-SCNC: 102 MMOL/L
CHOLEST SERPL-MCNC: 171 MG/DL
CHOLEST/HDLC SERPL: 2.9 {RATIO}
CO2 SERPL-SCNC: 29 MMOL/L
CREAT SERPL-MCNC: 1.4 MG/DL
EST. GFR  (AFRICAN AMERICAN): >60 ML/MIN/1.73 M^2
EST. GFR  (NON AFRICAN AMERICAN): 52 ML/MIN/1.73 M^2
GLUCOSE SERPL-MCNC: 124 MG/DL
HDLC SERPL-MCNC: 60 MG/DL
HDLC SERPL: 35.1 %
LDLC SERPL CALC-MCNC: 98.8 MG/DL
NONHDLC SERPL-MCNC: 111 MG/DL
POTASSIUM SERPL-SCNC: 3.6 MMOL/L
PROT SERPL-MCNC: 7.8 G/DL
SODIUM SERPL-SCNC: 138 MMOL/L
TRIGL SERPL-MCNC: 61 MG/DL

## 2018-01-30 PROCEDURE — 84153 ASSAY OF PSA TOTAL: CPT

## 2018-01-30 PROCEDURE — 80061 LIPID PANEL: CPT

## 2018-01-30 PROCEDURE — 80053 COMPREHEN METABOLIC PANEL: CPT

## 2018-01-30 PROCEDURE — 36415 COLL VENOUS BLD VENIPUNCTURE: CPT | Mod: PN

## 2018-01-31 LAB — COMPLEXED PSA SERPL-MCNC: 0.98 NG/ML

## 2018-02-02 ENCOUNTER — OFFICE VISIT (OUTPATIENT)
Dept: FAMILY MEDICINE | Facility: CLINIC | Age: 68
End: 2018-02-02
Payer: MEDICARE

## 2018-02-02 ENCOUNTER — LAB VISIT (OUTPATIENT)
Dept: LAB | Facility: HOSPITAL | Age: 68
End: 2018-02-02
Attending: FAMILY MEDICINE
Payer: MEDICARE

## 2018-02-02 VITALS
SYSTOLIC BLOOD PRESSURE: 124 MMHG | DIASTOLIC BLOOD PRESSURE: 84 MMHG | HEART RATE: 71 BPM | BODY MASS INDEX: 34.75 KG/M2 | OXYGEN SATURATION: 93 % | WEIGHT: 208.56 LBS | HEIGHT: 65 IN | TEMPERATURE: 99 F

## 2018-02-02 DIAGNOSIS — M17.0 PRIMARY OSTEOARTHRITIS OF BOTH KNEES: ICD-10-CM

## 2018-02-02 DIAGNOSIS — I10 ESSENTIAL HYPERTENSION: ICD-10-CM

## 2018-02-02 DIAGNOSIS — Z00.00 ENCOUNTER FOR ROUTINE HISTORY AND PHYSICAL EXAM FOR MALE: Primary | ICD-10-CM

## 2018-02-02 DIAGNOSIS — R73.9 HYPERGLYCEMIA: ICD-10-CM

## 2018-02-02 DIAGNOSIS — E78.2 MIXED HYPERLIPIDEMIA: ICD-10-CM

## 2018-02-02 PROCEDURE — 99999 PR PBB SHADOW E&M-EST. PATIENT-LVL III: CPT | Mod: PBBFAC,,, | Performed by: FAMILY MEDICINE

## 2018-02-02 PROCEDURE — 36415 COLL VENOUS BLD VENIPUNCTURE: CPT | Mod: PN

## 2018-02-02 PROCEDURE — 93005 ELECTROCARDIOGRAM TRACING: CPT | Mod: PBBFAC,PN | Performed by: FAMILY MEDICINE

## 2018-02-02 PROCEDURE — 83036 HEMOGLOBIN GLYCOSYLATED A1C: CPT

## 2018-02-02 PROCEDURE — 93010 ELECTROCARDIOGRAM REPORT: CPT | Mod: ,,, | Performed by: INTERNAL MEDICINE

## 2018-02-02 PROCEDURE — 99213 OFFICE O/P EST LOW 20 MIN: CPT | Mod: PBBFAC,PN,25 | Performed by: FAMILY MEDICINE

## 2018-02-02 PROCEDURE — 99397 PER PM REEVAL EST PAT 65+ YR: CPT | Mod: S$PBB,,, | Performed by: FAMILY MEDICINE

## 2018-02-02 NOTE — PROGRESS NOTES
CC:   Chief Complaint   Patient presents with    Annual Exam    Results       The patient is a 67 y.o. Black or  male who presents to the office today for annual preventative health visit.    The primary encounter diagnosis was Encounter for routine history and physical exam for male. Diagnoses of Essential hypertension, Mixed hyperlipidemia, Primary osteoarthritis of both knees, and Hyperglycemia were also pertinent to this visit.    Patient Active Problem List   Diagnosis    Essential hypertension    Hyperlipidemia    Gastritis    Seasonal allergies    Primary osteoarthritis of both knees    Fecal urgency       Past Medical History:   Diagnosis Date    Allergy     Hyperlipidemia     Hypertension     Reflux esophagitis        Past Surgical History:   Procedure Laterality Date    arthroscopic on left knee      CARPAL TUNNEL RELEASE      right hand only    deviated septum repair      VASECTOMY           Current Outpatient Prescriptions:     amlodipine (NORVASC) 5 MG tablet, Take 2 tablets (10 mg total) by mouth once daily., Disp: 180 tablet, Rfl: 3    ascorbic acid (VITAMIN C) 500 MG tablet, Take 500 mg by mouth 2 (two) times daily., Disp: , Rfl:     aspirin (ECOTRIN) 81 MG EC tablet, Take 81 mg by mouth once daily., Disp: , Rfl:     azelastine-fluticasone (DYMISTA) 137-50 mcg/spray Spry nassal spray, 1 spray by Each Nare route 2 (two) times daily., Disp: 1 Bottle, Rfl: 0    fish oil-omega-3 fatty acids 300-1,000 mg capsule, Take 2 g by mouth once daily., Disp: , Rfl:     hydrochlorothiazide (HYDRODIURIL) 25 MG tablet, Take 1 tablet (25 mg total) by mouth once daily., Disp: 90 tablet, Rfl: 3    multivitamin capsule, Take 1 capsule by mouth once daily., Disp: , Rfl:     olmesartan (BENICAR) 40 MG tablet, Take 1 tablet (40 mg total) by mouth once daily., Disp: 90 tablet, Rfl: 3    rosuvastatin (CRESTOR) 10 MG tablet, TAKE 1 TABLET DAILY, Disp: 90 tablet, Rfl: 3    sucralfate  (CARAFATE) 1 gram tablet, Take 1 tablet (1 g total) by mouth 4 (four) times daily., Disp: 360 tablet, Rfl: 1    glucosamine-chondroitin 500-400 mg tablet, Take 1 tablet by mouth 3 (three) times daily., Disp: , Rfl:     Review of patient's allergies indicates:  No Known Allergies    Family History   Problem Relation Age of Onset    Arthritis Mother     Hyperlipidemia Mother     Hypertension Mother     Cancer Father      prostate    Stroke Father        Social History     Social History    Marital status:      Spouse name: N/A    Number of children: N/A    Years of education: N/A     Occupational History    Not on file.     Social History Main Topics    Smoking status: Never Smoker    Smokeless tobacco: Never Used      Comment: .  One child.  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      Alcohol use 8.4 oz/week     14 Glasses of wine per week      Comment: Drinks 2 glasses of wine per night    Drug use: No    Sexual activity: Yes     Partners: Female     Other Topics Concern    Not on file     Social History Narrative    No narrative on file       Health Maintenance   Topic Date Due    Zoster Vaccine  05/03/2011    Pneumococcal (65+) (1 of 2 - PCV13) 05/03/2016    Eye Exam  07/31/2018    Colonoscopy  11/30/2021    Lipid Panel  01/30/2023    TETANUS VACCINE  09/26/2027    Hepatitis C Screening  Addressed    Influenza Vaccine  Completed       Patient Care Team:  Karina Stewart MD as PCP - General (Family Medicine)  Lissette Greco MD as PCP - [facility]    DEPRESSION SCREENING  1. Over the past two weeks, have you felt down, depressed or hopeless?                      No  2. Over the past two weeks, have you felt little interest or pleasure in doing things?     No    Patient counseled on healthy diet and appropriate exercise regimen for cardiovascular health.  Patient instructed on use of seat belts, safety helmets, and sunscreen during sun exposure.    Patient counseled on  "healthy diet and appropriate exercise regimen for cardiovascular health.  Patient instructed on use of seat belts, safety helmets, and sunscreen during sun exposure.    Advanced directives: has an advanced directive - a copy HAS NOT been provided.  EKG today: Yes  Pure tone audiometry today: No  Immunizations:   Influenza vaccine: up to date  Pneumovax: up to date  PCV13: not up to date   HPV: NA  Tdap: declined  Zostavax: not up to date - decilned  Recommended dental exam.  Recommended glaucoma screening by ophthalmologist or optometrist.  Colon cancer screen: COLONOSCOPY / ACBE / FLEX SIG / FOBT x 3.  Diabetes self-management training: No  Medical nutrition therapy for diabetes or renal disease: No  Abdominal aortic aneurysm screening: Abdominal ultrasound  No    Review of Systems   Constitutional: Negative for activity change and unexpected weight change.   HENT: Negative for hearing loss, rhinorrhea and trouble swallowing.    Eyes: Negative for discharge and visual disturbance.   Respiratory: Negative for chest tightness and wheezing.    Cardiovascular: Negative for chest pain and palpitations.   Gastrointestinal: Negative for blood in stool, constipation, diarrhea and vomiting.   Endocrine: Negative for polydipsia and polyuria.   Genitourinary: Negative for difficulty urinating, hematuria and urgency.   Musculoskeletal: Negative for arthralgias, joint swelling and neck pain.   Neurological: Negative for weakness and headaches.   Psychiatric/Behavioral: Negative for confusion and dysphoric mood.         PHYSICAL EXAMINATION:    Vital signs: /84   Pulse 71   Temp 98.8 °F (37.1 °C) (Oral)   Ht 5' 5" (1.651 m)   Wt 94.6 kg (208 lb 8.9 oz)   SpO2 (!) 93%   BMI 34.71 kg/m²      Physical Exam    Assessment:    1. Encounter for routine history and physical exam for male    2. Essential hypertension    3. Mixed hyperlipidemia    4. Primary osteoarthritis of both knees    5. Hyperglycemia        Luciano was " seen today for annual exam and results.    Diagnoses and all orders for this visit:    Encounter for routine history and physical exam for male  -     CBC auto differential; Future  - Annual labs reviewed with patient in office today.  Patient requests CBC.  Vaccinations and age-appropriate cancer screening reviewed with patient see health assessment above.         Essential hypertension  -     IN OFFICE EKG 12-LEAD (to Muse); Future  - Stable.  Annual EKG obtained.    Mixed hyperlipidemia   -Stable.  Lipid panel reviewed with patient.  No change to current medication regimen.    Primary osteoarthritis of both knees   -Stable.  Continue physical activity.  Decrease impact exercises.    Hyperglycemia  -     Hemoglobin A1c; Future  - New problem.  Elevated blood glucose level and CMP.  Obtain hemoglobin A1c.            FOLLOW UP: Follow-up in about 6 months (around 8/2/2018) for Follow up.

## 2018-02-02 NOTE — PATIENT INSTRUCTIONS
Pneumococcal Vaccine, Polyvalent suspension for injection  What is this medicine?  PNEUMOCOCCAL VACCINE (DAKOTA mo JOSESITO al vak SEEN) is a vaccine used to prevent pneumococcus bacterial infections. These bacteria can cause serious infections like pneumonia, meningitis, and blood infections. This vaccine will lower your chance of getting pneumonia. If you do get pneumonia, it can make your symptoms milder and your illness shorter. This vaccine will not treat an infection and will not cause infection. This vaccine is recommended for infants and young children, adults with certain medical conditions, and adults 65 years or older.  How should I use this medicine?  This vaccine is for injection into a muscle. It is given by a health care professional.  A copy of Vaccine Information Statements will be given before each vaccination. Read this sheet carefully each time. The sheet may change frequently.  Talk to your pediatrician regarding the use of this medicine in children. While this drug may be prescribed for children as young as 6 weeks old for selected conditions, precautions do apply.  What side effects may I notice from receiving this medicine?  Side effects that you should report to your doctor or health care professional as soon as possible:  · allergic reactions like skin rash, itching or hives, swelling of the face, lips, or tongue  · breathing problems  · confused  · fast or irregular heartbeat  · fever over 102 degrees F  · seizures  · unusual bleeding or bruising  · unusual muscle weakness  Side effects that usually do not require medical attention (report to your doctor or health care professional if they continue or are bothersome):  · aches and pains  · diarrhea  · fever of 102 degrees F or less  · headache  · irritable  · loss of appetite  · pain, tender at site where injected  · trouble sleeping  What may interact with this medicine?  · medicines for cancer chemotherapy  · medicines that suppress your  immune function  · steroid medicines like prednisone or cortisone  What if I miss a dose?  It is important not to miss your dose. Call your doctor or health care professional if you are unable to keep an appointment.  Where should I keep my medicine?  This does not apply. This vaccine is given in a clinic, pharmacy, doctor's office, or other health care setting and will not be stored at home.  What should I tell my health care provider before I take this medicine?  They need to know if you have any of these conditions:  · bleeding problems  · fever  · immune system problems  · an unusual or allergic reaction to pneumococcal vaccine, diphtheria toxoid, other vaccines, latex, other medicines, foods, dyes, or preservatives  · pregnant or trying to get pregnant  · breast-feeding  What should I watch for while using this medicine?  Mild fever and pain should go away in 3 days or less. Report any unusual symptoms to your doctor or health care professional.  NOTE:This sheet is a summary. It may not cover all possible information. If you have questions about this medicine, talk to your doctor, pharmacist, or health care provider. Copyright© 2017 [employer]        Prevention Guidelines, Men Ages 65 and Older  Screening tests and vaccines are an important part of managing your health. Health counseling is essential, too. Below are guidelines for these, for men ages 65 and older. Talk with your healthcare provider to make sure youre up-to-date on what you need.  Screening Who needs it How often   Abdominal aortic aneurysm Men ages 65 to 75 who have ever smoked 1 ultrasound   Alcohol misuse All men in this age group At routine exams   Blood pressure All men in this age group Every 2 years if your blood pressure is less than 120/80 mm Hg; yearly if your systolic blood pressure is 120 to 139 mm Hg, or your diastolic blood pressure reading is 80 to 89 mm Hg   Colorectal cancer All men in this age group Flexible sigmoidoscopy  every 5 years, or colonoscopy every 10 years, or double-contrast barium enema every 5 years; yearly fecal occult blood test or fecal immunochemical test; or a stool DNA test as often as your healthcare provider advises; talk with your healthcare provider about which tests are best for you and when you no longer need colonoscopies (generally after age 75)   Depression All men in this age group At routine exams   Type 2 diabetes or prediabetes All adults beginning at age 45 and adults without symptoms at any age who are overweight or obese and have 1 or more other risk factors for diabetes At least every 3 years (yearly if your blood sugar has already begun to rise)   Hepatitis C Men at increased risk for infection - talk with your healthcare provider At routine exams   High cholesterol or triglycerides All men in this age group At least every 5 years   HIV Men at increased risk for infection - talk with your healthcare provider At routine exams   Lung cancer Adults ages 55 to 80 who have smoked Yearly screening in smokers with 30 pack-year history of smoking or who quit within 15 years   Obesity All men in this age group At routine exams   Prostate cancer All men in this age group, talk to healthcare provider about risks and benefits of digital rectal exam (DAVIN) and prostate-specific antigen (PSA) screening1 At routine exams   Syphilis Men at increased risk for infection - talk with your healthcare provider At routine exams   Tuberculosis Men at increased risk for infection - talk with your healthcare provider Ask your healthcare provider   Vision All men in this age group Every 1 to 2 years; if you have a chronic health condition, ask your healthcare provider if you needs exams more often   Vaccine Who needs it How often   Chickenpox (varicella) All men in this age group who have no record of this infection or vaccine 2 doses; second dose should be given at least 4 weeks after the first dose   Hepatitis A Men at  increased risk for infection - talk with your healthcare provider 2 doses given at least 6 months apart   Hepatitis B Men at increased risk for infection - talk with your healthcare provider 3 doses over 6 months; second dose should be given 1 month after the first dose; the third dose should be given at least 2 months after the second dose and at least 4 months after the first dose   Haemophilus influenzae Type B (HIB) Men at increased risk for infection - talk with your healthcare provider 1 to 3 doses   Influenza (flu) All men in this age group  Once a year   Meningococcal Men at increased risk for infection - talk with your healthcare provider 1 or more doses   Pneumococcal conjugate vaccine (PCV13) and pneumococcal polysaccharide vaccine (PPSV23) All men in this age group 1 dose of each vaccine   Tetanus/diphtheria/  pertussis (Td/Tdap) booster All men in this age group Td every 10 years, or Tdap if you will have contact with a child younger than 12 months old   Zoster All men in this age group 1 dose   Counseling Who needs it How often   Diet and exercise Men who are overweight or obese When diagnosed, and then at routine exams   Fall prevention (exercise, vitamin D supplements) All men in this age group At routine exams   Sexually transmitted infection Men at increased risk for infection - talk with your healthcare provider At routine exams   Use of daily aspirin Men ages 45 to 79 at risk for cardiovascular health problems At routine exams   Use of tobacco and the health effects it can cause All men in this age group Every visit   [address] [place] [address] [facility]   Date Last Reviewed: 2/1/2017 © 2000-2017 [employer]. [address] [place] [address], [place] [address]. All rights reserved. This information is not intended as a substitute for professional medical care. Always follow your healthcare professional's instructions.

## 2018-02-03 LAB
ESTIMATED AVG GLUCOSE: 120 MG/DL
HBA1C MFR BLD HPLC: 5.8 %

## 2018-03-15 ENCOUNTER — TELEPHONE (OUTPATIENT)
Dept: FAMILY MEDICINE | Facility: CLINIC | Age: 68
End: 2018-03-15

## 2018-03-15 NOTE — TELEPHONE ENCOUNTER
----- Message from Judy Cardona sent at 3/15/2018 11:51 AM CDT -----  Contact: self  Patient is requesting a refill on Dym[facility]. Patient uses [facility].    Patient can be reached at [phone].      Thanks,

## 2018-03-19 RX ORDER — AZELASTINE HYDROCHLORIDE, FLUTICASONE PROPIONATE 137; 50 UG/1; UG/1
1 SPRAY, METERED NASAL 2 TIMES DAILY
Qty: 1 BOTTLE | Refills: 3 | Status: SHIPPED | OUTPATIENT
Start: 2018-03-19 | End: 2019-03-21 | Stop reason: SDUPTHER

## 2018-03-23 RX ORDER — SUCRALFATE 1 G/1
1 TABLET ORAL
Qty: 360 TABLET | Refills: 2 | Status: SHIPPED | OUTPATIENT
Start: 2018-03-23 | End: 2019-03-28 | Stop reason: SDUPTHER

## 2018-03-23 NOTE — TELEPHONE ENCOUNTER
----- Message from Goldie De León sent at 3/23/2018 12:45 PM CDT -----  Contact: wife/Valorie  Refill: sucralfate (CARAFATE) 1 gram tablet    [facility] - [address]    Pt's wife (Valorie) can be reached at [phone]. Thank you!

## 2018-03-27 ENCOUNTER — TELEPHONE (OUTPATIENT)
Dept: FAMILY MEDICINE | Facility: CLINIC | Age: 68
End: 2018-03-27

## 2018-03-27 NOTE — TELEPHONE ENCOUNTER
----- Message from Alejandrina Charles sent at 3/27/2018 10:43 AM CDT -----  Contact: Self   Patient says medication is waiting for further instructions in order to put it through. Pt would like you to call the pharmacy [phone].     sucralfate (CARAFATE) 1 gram tablet    Pharmacy: [facility]

## 2018-03-29 ENCOUNTER — HOSPITAL ENCOUNTER (OUTPATIENT)
Dept: RADIOLOGY | Facility: HOSPITAL | Age: 68
Discharge: HOME | End: 2018-03-29
Attending: SURGERY
Payer: MEDICARE

## 2018-03-29 DIAGNOSIS — K40.90 LEFT INGUINAL HERNIA: ICD-10-CM

## 2018-03-29 PROCEDURE — 25500020 PHARM REV CODE 255: Performed by: SURGERY

## 2018-03-29 PROCEDURE — 72193 CT PELVIS W/DYE: CPT | Mod: TC

## 2018-03-29 PROCEDURE — 72193 CT PELVIS W/DYE: CPT | Mod: 26,,, | Performed by: RADIOLOGY

## 2018-03-29 RX ADMIN — IOHEXOL 30 ML: 300 INJECTION, SOLUTION INTRAVENOUS at 09:03

## 2018-03-29 RX ADMIN — IOHEXOL 80 ML: 350 INJECTION, SOLUTION INTRAVENOUS at 09:03

## 2018-05-16 ENCOUNTER — OFFICE VISIT (OUTPATIENT)
Dept: FAMILY MEDICINE | Facility: CLINIC | Age: 68
End: 2018-05-16
Payer: MEDICARE

## 2018-05-16 VITALS
SYSTOLIC BLOOD PRESSURE: 142 MMHG | HEIGHT: 65 IN | OXYGEN SATURATION: 93 % | DIASTOLIC BLOOD PRESSURE: 80 MMHG | HEART RATE: 76 BPM | WEIGHT: 192.69 LBS | BODY MASS INDEX: 32.1 KG/M2 | TEMPERATURE: 98 F

## 2018-05-16 DIAGNOSIS — M17.0 PRIMARY OSTEOARTHRITIS OF BOTH KNEES: Primary | ICD-10-CM

## 2018-05-16 PROCEDURE — 99999 PR PBB SHADOW E&M-EST. PATIENT-LVL IV: CPT | Mod: PBBFAC,,, | Performed by: FAMILY MEDICINE

## 2018-05-16 PROCEDURE — 99213 OFFICE O/P EST LOW 20 MIN: CPT | Mod: S$PBB,,, | Performed by: FAMILY MEDICINE

## 2018-05-16 PROCEDURE — 99214 OFFICE O/P EST MOD 30 MIN: CPT | Mod: PBBFAC,PN | Performed by: FAMILY MEDICINE

## 2018-05-16 PROCEDURE — G0009 ADMIN PNEUMOCOCCAL VACCINE: HCPCS | Mod: PBBFAC,PN

## 2018-05-16 NOTE — PATIENT INSTRUCTIONS
Pneumococcal Vaccine, Polyvalent suspension for injection  What is this medicine?  PNEUMOCOCCAL VACCINE (DAKOTA mo JOSESITO al vak SEEN) is a vaccine used to prevent pneumococcus bacterial infections. These bacteria can cause serious infections like pneumonia, meningitis, and blood infections. This vaccine will lower your chance of getting pneumonia. If you do get pneumonia, it can make your symptoms milder and your illness shorter. This vaccine will not treat an infection and will not cause infection. This vaccine is recommended for infants and young children, adults with certain medical conditions, and adults 65 years or older.  How should I use this medicine?  This vaccine is for injection into a muscle. It is given by a health care professional.  A copy of Vaccine Information Statements will be given before each vaccination. Read this sheet carefully each time. The sheet may change frequently.  Talk to your pediatrician regarding the use of this medicine in children. While this drug may be prescribed for children as young as 6 weeks old for selected conditions, precautions do apply.  What side effects may I notice from receiving this medicine?  Side effects that you should report to your doctor or health care professional as soon as possible:  · allergic reactions like skin rash, itching or hives, swelling of the face, lips, or tongue  · breathing problems  · confused  · fast or irregular heartbeat  · fever over 102 degrees F  · seizures  · unusual bleeding or bruising  · unusual muscle weakness  Side effects that usually do not require medical attention (report to your doctor or health care professional if they continue or are bothersome):  · aches and pains  · diarrhea  · fever of 102 degrees F or less  · headache  · irritable  · loss of appetite  · pain, tender at site where injected  · trouble sleeping  What may interact with this medicine?  · medicines for cancer chemotherapy  · medicines that suppress your  immune function  · steroid medicines like prednisone or cortisone  What if I miss a dose?  It is important not to miss your dose. Call your doctor or health care professional if you are unable to keep an appointment.  Where should I keep my medicine?  This does not apply. This vaccine is given in a clinic, pharmacy, doctor's office, or other health care setting and will not be stored at home.  What should I tell my health care provider before I take this medicine?  They need to know if you have any of these conditions:  · bleeding problems  · fever  · immune system problems  · an unusual or allergic reaction to pneumococcal vaccine, diphtheria toxoid, other vaccines, latex, other medicines, foods, dyes, or preservatives  · pregnant or trying to get pregnant  · breast-feeding  What should I watch for while using this medicine?  Mild fever and pain should go away in 3 days or less. Report any unusual symptoms to your doctor or health care professional.  NOTE:This sheet is a summary. It may not cover all possible information. If you have questions about this medicine, talk to your doctor, pharmacist, or health care provider. Copyright© 2017 [employer]

## 2018-05-16 NOTE — PROGRESS NOTES
Chief Complaint   Patient presents with    Joint Swelling     knee swelling in both knees    Knee Pain       HPI    Luciano Henriquez Jr. is 68 y.o. male. The encounter diagnosis was Primary osteoarthritis of both knees.    68-year-old male comes to clinic with complaint of bilateral knee pain and swelling. Patient reports that he has been previously evaluated by Orthopedics but was informed that his arthritis was not severe enough for further intervention.  Patient reports that this has been several years ago.    Today he reports that his pain and swelling has improved since scheduling this visit.  Prior to this he reports intermittent pain and swelling. He denies loss of range of motion in the knee.  He does note significant stiffness upon standing from a seated position.  He has completed physical therapy in the past.  He currently exercises daily using the recumbent bike.    Review of Systems   Constitutional: Negative for activity change and unexpected weight change.   HENT: Negative for hearing loss, rhinorrhea and trouble swallowing.    Eyes: Negative for discharge and visual disturbance.   Respiratory: Negative for chest tightness and wheezing.    Cardiovascular: Negative for chest pain and palpitations.   Gastrointestinal: Negative for blood in stool, constipation, diarrhea and vomiting.   Endocrine: Negative for polydipsia and polyuria.   Genitourinary: Negative for difficulty urinating, hematuria and urgency.   Musculoskeletal: Positive for arthralgias and joint swelling. Negative for neck pain.   Neurological: Negative for weakness and headaches.   Psychiatric/Behavioral: Negative for confusion and dysphoric mood.           Current Outpatient Prescriptions:     amlodipine (NORVASC) 5 MG tablet, Take 2 tablets (10 mg total) by mouth once daily., Disp: 180 tablet, Rfl: 3    ascorbic acid (VITAMIN C) 500 MG tablet, Take 500 mg by mouth 2 (two) times daily., Disp: , Rfl:     aspirin (ECOTRIN) 81 MG EC  "tablet, Take 81 mg by mouth once daily., Disp: , Rfl:     fish oil-omega-3 fatty acids 300-1,000 mg capsule, Take 2 g by mouth once daily., Disp: , Rfl:     hydrochlorothiazide (HYDRODIURIL) 25 MG tablet, Take 1 tablet (25 mg total) by mouth once daily., Disp: 90 tablet, Rfl: 3    rosuvastatin (CRESTOR) 10 MG tablet, TAKE 1 TABLET DAILY, Disp: 90 tablet, Rfl: 3    sucralfate (CARAFATE) 1 gram tablet, Take 1 tablet (1 g total) by mouth 4 (four) times daily before meals and nightly., Disp: 360 tablet, Rfl: 2    azelastine-fluticasone (DYMISTA) 137-50 mcg/spray Spry nassal spray, 1 spray by Each Nare route 2 (two) times daily., Disp: 1 Bottle, Rfl: 3    glucosamine-chondroitin 500-400 mg tablet, Take 1 tablet by mouth 3 (three) times daily., Disp: , Rfl:     multivitamin capsule, Take 1 capsule by mouth once daily., Disp: , Rfl:     olmesartan (BENICAR) 40 MG tablet, Take 1 tablet (40 mg total) by mouth once daily., Disp: 90 tablet, Rfl: 3      Blood pressure (!) 142/80, pulse 76, temperature 98.3 °F (36.8 °C), temperature source Oral, height 5' 5" (1.651 m), weight 87.4 kg (192 lb 10.9 oz), SpO2 (!) 93 %.    Physical Exam   Constitutional: Vital signs are normal. He appears well-developed. He does not appear ill. No distress.       Lab Visit on 03/29/2018   Component Date Value Ref Range Status    Creatinine 03/29/2018 1.3  0.5 - 1.4 mg/dL Final    eGFR if African American 03/29/2018 >60  >60 mL/min/1.73 m^2 Final    eGFR if non African American 03/29/2018 57* >60 mL/min/1.73 m^2 Final   ]    Assessment:    1. Primary osteoarthritis of both knees          Luciano was seen today for joint swelling and knee pain.    Diagnoses and all orders for this visit:    Primary osteoarthritis of both knees  -     Ambulatory referral to Orthopedics  - worsening.  Patient's previous imaging reviewed and severe degenerative change noted in both knees.  Patient referred to Orthopedics for evaluation and future " planning.    Other orders  -     Cancel: (In Office Administered) Zoster Vaccine - Live  -     Cancel: (In Office Administered) Pneumococcal Conjugate Vaccine (13 Valent) (IM)          FOLLOW UP: Follow-up in about 3 months (around 8/16/2018), or if symptoms worsen or fail to improve, for pneumonia vaccination and knee follow up.

## 2018-06-15 ENCOUNTER — TELEPHONE (OUTPATIENT)
Dept: FAMILY MEDICINE | Facility: CLINIC | Age: 68
End: 2018-06-15

## 2018-06-15 DIAGNOSIS — I10 ESSENTIAL HYPERTENSION: ICD-10-CM

## 2018-06-15 RX ORDER — AMLODIPINE BESYLATE 10 MG/1
10 TABLET ORAL DAILY
Qty: 30 TABLET | Refills: 1 | Status: SHIPPED | OUTPATIENT
Start: 2018-06-15 | End: 2019-03-21 | Stop reason: SDUPTHER

## 2018-06-15 RX ORDER — AMLODIPINE BESYLATE 5 MG/1
10 TABLET ORAL DAILY
Qty: 180 TABLET | Refills: 3 | OUTPATIENT
Start: 2018-06-15

## 2018-06-15 RX ORDER — AMLODIPINE BESYLATE 10 MG/1
10 TABLET ORAL DAILY
Qty: 90 TABLET | Refills: 3 | Status: SHIPPED | OUTPATIENT
Start: 2018-06-15 | End: 2018-06-15 | Stop reason: SDUPTHER

## 2018-06-15 NOTE — TELEPHONE ENCOUNTER
Please contact patient and inform supply sent to home delivery and 30 day supply sent to [facility]    He should be aware that the dosage of the tablets has changed.  He will take 1-10mg tablet daily rather than 2-5mg tablets.  If he has any concerns about this change please let me know.

## 2018-06-15 NOTE — TELEPHONE ENCOUNTER
----- Message from Beth Lin sent at 6/15/2018  9:20 AM CDT -----  Contact: Self   Patient says he need a refill. Patient is completely out of medication and he need some called into a local pharmacy until the mail order can come in.   Please call patient at  [phone].      amlodipine (NORVASC) 5 MG tablet    [facility] [address] [place] [address]      Spoke with wife she states that mail refill order was done but her  is completely out of Norvasc and a one month refill was requested to be filled at [facility]. LOV was 5/16/18

## 2018-06-19 NOTE — TELEPHONE ENCOUNTER
Talked to patient regarding his Rx. Change. Patient is aware and recall the directions back to me.

## 2018-08-07 RX ORDER — HYDROCHLOROTHIAZIDE 25 MG/1
25 TABLET ORAL DAILY
Qty: 90 TABLET | Refills: 3 | Status: SHIPPED | OUTPATIENT
Start: 2018-08-07 | End: 2018-10-11 | Stop reason: ALTCHOICE

## 2018-08-07 NOTE — TELEPHONE ENCOUNTER
----- Message from Beth Lin sent at 8/7/2018 11:13 AM CDT -----  Contact: Self   Patient says he need a refill on his medication. Please call patient at [phone]. ( Patient would like a call back to confirm his medication was taken care of)      hydrochlorothiazide (HYDRODIURIL) 25 MG tablet      [facility] - [address]

## 2018-08-08 RX ORDER — OLMESARTAN MEDOXOMIL 40 MG/1
40 TABLET ORAL DAILY
Qty: 90 TABLET | Refills: 3 | Status: SHIPPED | OUTPATIENT
Start: 2018-08-08 | End: 2019-02-08 | Stop reason: ALTCHOICE

## 2018-08-14 ENCOUNTER — CLINICAL SUPPORT (OUTPATIENT)
Dept: FAMILY MEDICINE | Facility: CLINIC | Age: 68
End: 2018-08-14
Payer: MEDICARE

## 2018-08-14 ENCOUNTER — TELEPHONE (OUTPATIENT)
Dept: FAMILY MEDICINE | Facility: CLINIC | Age: 68
End: 2018-08-14

## 2018-08-14 DIAGNOSIS — Z23 IMMUNIZATION DUE: Primary | ICD-10-CM

## 2018-08-14 PROCEDURE — 99499 UNLISTED E&M SERVICE: CPT | Mod: S$PBB,,, | Performed by: FAMILY MEDICINE

## 2018-08-14 PROCEDURE — 90670 PCV13 VACCINE IM: CPT | Mod: PBBFAC,PN

## 2018-08-22 NOTE — TELEPHONE ENCOUNTER
----- Message from Michelle Soto sent at 8/22/2018  9:33 AM CDT -----  Contact: Self   Refill : rosuvastatin (CRESTOR) 10 MG tablet (90 day supply)      Pharmacy     [facility] - [address]

## 2018-08-23 RX ORDER — ROSUVASTATIN CALCIUM 10 MG/1
10 TABLET, COATED ORAL DAILY
Qty: 90 TABLET | Refills: 3 | Status: SHIPPED | OUTPATIENT
Start: 2018-08-23 | End: 2019-03-21 | Stop reason: SDUPTHER

## 2018-08-29 ENCOUNTER — OFFICE VISIT (OUTPATIENT)
Dept: FAMILY MEDICINE | Facility: CLINIC | Age: 68
End: 2018-08-29
Payer: MEDICARE

## 2018-08-29 VITALS
HEART RATE: 74 BPM | BODY MASS INDEX: 35.45 KG/M2 | TEMPERATURE: 98 F | DIASTOLIC BLOOD PRESSURE: 82 MMHG | SYSTOLIC BLOOD PRESSURE: 144 MMHG | RESPIRATION RATE: 16 BRPM | OXYGEN SATURATION: 96 % | HEIGHT: 65 IN | WEIGHT: 212.75 LBS

## 2018-08-29 DIAGNOSIS — S76.212A GROIN STRAIN, LEFT, INITIAL ENCOUNTER: Primary | ICD-10-CM

## 2018-08-29 PROCEDURE — 99213 OFFICE O/P EST LOW 20 MIN: CPT | Mod: PBBFAC,PN | Performed by: FAMILY MEDICINE

## 2018-08-29 PROCEDURE — 99214 OFFICE O/P EST MOD 30 MIN: CPT | Mod: S$PBB,,, | Performed by: FAMILY MEDICINE

## 2018-08-29 PROCEDURE — 99999 PR PBB SHADOW E&M-EST. PATIENT-LVL III: CPT | Mod: PBBFAC,,, | Performed by: FAMILY MEDICINE

## 2018-08-29 RX ORDER — TIZANIDINE 4 MG/1
4 TABLET ORAL EVERY 8 HOURS PRN
Qty: 30 TABLET | Refills: 0 | Status: SHIPPED | OUTPATIENT
Start: 2018-08-29 | End: 2018-09-08

## 2018-08-29 RX ORDER — DOXEPIN HYDROCHLORIDE 50 MG/G
CREAM TOPICAL
COMMUNITY
Start: 2018-07-02

## 2018-08-29 RX ORDER — AMITRIPTYLINE HYDROCHLORIDE 100 MG/1
TABLET ORAL
COMMUNITY
Start: 2018-07-02

## 2018-08-29 NOTE — LETTER
August 29, 2018      [facility]  [address]  [place] [address]  Phone: [phone]       Patient: Luciano Henriquez   YOB: 1950  Date of Visit: 08/29/2018    To Whom It May Concern:    Chito Henriquez  was at [facility] on 08/29/2018. He will not be able to return to the gym for several months due to an acute injury. If you have any questions or concerns, or if I can be of further assistance, please do not hesitate to contact me.    Sincerely,          Marcelino Iglesias MD

## 2018-08-30 NOTE — PROGRESS NOTES
Routine Office Visit    Patient Name: Luciano Henriquez Jr.    : 1950  MRN: [id number]    Subjective:  Luciano is a 68 y.o. male who presents today for:    1. Left hip and back pain  Patient presenting today with recurring left hip and back pain.  The pain is mainly in the left groin and he feels it from working out as much as he has been lately.  He also has recurring back pain, but hat has gone for a few weeks.  There has been no numbness, tingling, weakness, or incontinence.   He is sitting a lot at work and states that it is an uncomfortable chair.  He has been given muscle relaxers in the past for the pain and they did help     Past Medical History  Past Medical History:   Diagnosis Date    Allergy     Hyperlipidemia     Hypertension     Reflux esophagitis        Past Surgical History  Past Surgical History:   Procedure Laterality Date    arthroscopic on left knee      CARPAL TUNNEL RELEASE      right hand only    deviated septum repair      HERNIA REPAIR Left     inguinal    VASECTOMY         Family History  Family History   Problem Relation Age of Onset    Arthritis Mother     Hyperlipidemia Mother     Hypertension Mother     Cancer Father         prostate    Stroke Father        Social History  Social History     Socioeconomic History    Marital status:      Spouse name: Not on file    Number of children: Not on file    Years of education: Not on file    Highest education level: Not on file   Social Needs    Financial resource strain: Not on file    Food insecurity - worry: Not on file    Food insecurity - inability: Not on file    Transportation needs - medical: Not on file    Transportation needs - non-medical: Not on file   Occupational History    Not on file   Tobacco Use    Smoking status: Never Smoker    Smokeless tobacco: Never Used    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     Substance and Sexual Activity    Alcohol use: Yes      Alcohol/week: 8.4 oz     Types: 14 Glasses of wine per week     Comment: Drinks 2 glasses of wine per night    Drug use: No    Sexual activity: Yes     Partners: Female   Other Topics Concern    Not on file   Social History Narrative    Not on file       Current Medications  Current Outpatient Medications on File Prior to Visit   Medication Sig Dispense Refill    amitriptyline (ELAVIL) 100 MG tablet       amLODIPine (NORVASC) 10 MG tablet Take 1 tablet (10 mg total) by mouth once daily. 30 tablet 1    ascorbic acid (VITAMIN C) 500 MG tablet Take 500 mg by mouth 2 (two) times daily.      aspirin (ECOTRIN) 81 MG EC tablet Take 81 mg by mouth once daily.      azelastine-fluticasone (DYMISTA) 137-50 mcg/spray Spry nassal spray 1 spray by Each Nare route 2 (two) times daily. 1 Bottle 3    doxepin (ZONALON) 5 % cream       fish oil-omega-3 fatty acids 300-1,000 mg capsule Take 2 g by mouth once daily.      hydroCHLOROthiazide (HYDRODIURIL) 25 MG tablet Take 1 tablet (25 mg total) by mouth once daily. 90 tablet 3    multivitamin capsule Take 1 capsule by mouth once daily.      olmesartan (BENICAR) 40 MG tablet Take 1 tablet (40 mg total) by mouth once daily. 90 tablet 3    rosuvastatin (CRESTOR) 10 MG tablet Take 1 tablet (10 mg total) by mouth once daily. 90 tablet 3    sucralfate (CARAFATE) 1 gram tablet Take 1 tablet (1 g total) by mouth 4 (four) times daily before meals and nightly. 360 tablet 2    glucosamine-chondroitin 500-400 mg tablet Take 1 tablet by mouth 3 (three) times daily.       No current facility-administered medications on file prior to visit.        Allergies   Review of patient's allergies indicates:  No Known Allergies    Review of Systems (Pertinent positives)  Review of Systems   Constitutional: Negative.    HENT: Negative.  Negative for hearing loss.    Eyes: Negative.  Negative for discharge.   Respiratory: Negative.  Negative for wheezing.    Cardiovascular: Negative.  Negative  "for chest pain and palpitations.   Gastrointestinal: Negative.  Negative for blood in stool, constipation, diarrhea and vomiting.   Genitourinary: Negative.  Negative for hematuria and urgency.   Musculoskeletal: Positive for back pain and myalgias.   Skin: Negative.    Neurological: Negative.  Negative for weakness and headaches.   Endo/Heme/Allergies: Negative for polydipsia.         BP (!) 144/82 (BP Location: Left arm, Patient Position: Sitting, BP Method: Medium (Manual))   Pulse 74   Temp 98.1 °F (36.7 °C) (Oral)   Resp 16   Ht 5' 5" (1.651 m)   Wt 96.5 kg (212 lb 11.9 oz)   SpO2 96%   BMI 35.40 kg/m²     GENERAL APPEARANCE: in no apparent distress and well developed and well nourished  HEENT: PERRL, EOMI, Sclera clear, anicteric, Oropharynx clear, no lesions, Neck supple with midline trachea  NECK: normal, supple, no adenopathy, thyroid normal in size  RESPIRATORY: appears well, vitals normal, no respiratory distress, acyanotic, normal RR, chest clear, no wheezing, crepitations, rhonchi, normal symmetric air entry  HEART: regular rate and rhythm, S1, S2 normal, no murmur, click, rub or gallop.    ABDOMEN: abdomen is soft without tenderness, no masses, no hernias, no organomegaly, no rebound, no guarding. Suprapubic tenderness absent. No CVA tenderness.  NEUROLOGIC: normal without focal findings, CN II-XII are intact.  Negative straight leg raise; Reflexes 2+ bilaterally  Extremities: warm/well perfused.  No abnormal hair patterns.  No clubbing, cyanosis or edema.  no muscular tenderness noted, full range of motion with pain on adduction of left hip.  no joint tenderness, deformity or swelling noted.  Pulses: Radial pulse 2 + Bilaterally, Dorsalis Pedis Pulse: 2 + Bilaterally, Posterior Tibial Pulse: 2 + Bilaterally.  SKIN: no rashes, no wounds, no other lesions  PSYCH: Alert, oriented x 3, thought content appropriate, speech normal, pleasant and cooperative, good eye contact, well " groomed    Assessment/Plan:  Luciano Henriquez Jr. is a 68 y.o. male who presents today for :    Luciano was seen today for fall and neck pain.    Diagnoses and all orders for this visit:    Groin strain, left, initial encounter    Other orders  -     tiZANidine (ZANAFLEX) 4 MG tablet; Take 1 tablet (4 mg total) by mouth every 8 (eight) hours as needed.    1.  Patient to take Zanaflex  2.  He is to take Aleve as directed on package  3.  Will consider PT if no improvement    Marcelino Iglesias MD

## 2018-09-13 ENCOUNTER — PATIENT MESSAGE (OUTPATIENT)
Dept: ADMINISTRATIVE | Facility: OTHER | Age: 68
End: 2018-09-13

## 2018-09-18 ENCOUNTER — CLINICAL SUPPORT (OUTPATIENT)
Dept: FAMILY MEDICINE | Facility: CLINIC | Age: 68
End: 2018-09-18
Payer: MEDICARE

## 2018-09-18 DIAGNOSIS — Z23 IMMUNIZATION DUE: Primary | ICD-10-CM

## 2018-09-18 PROCEDURE — 99499 UNLISTED E&M SERVICE: CPT | Mod: S$PBB,,, | Performed by: FAMILY MEDICINE

## 2018-09-18 PROCEDURE — 90662 IIV NO PRSV INCREASED AG IM: CPT | Mod: PBBFAC,PN

## 2018-09-27 ENCOUNTER — PATIENT OUTREACH (OUTPATIENT)
Dept: OTHER | Facility: OTHER | Age: 68
End: 2018-09-27

## 2018-09-27 NOTE — LETTER
Cindy Domingo, Kitty  [address] [place] [address]     Dear Luciano Henriquez Jr.,    Welcome to the [facility]!           My name is Cindy Domingo PharmD and I am your dedicated Digital Medicine clinician.  As an expert in medication management, I will help ensure that the medications you are taking continue to provide you with the intended benefits.        I am Lilly Delgado and I will be your health  for the duration of the program.  My  job is to help you identify lifestyle changes to improve your blood pressure control.  We will talk about nutrition, exercise, and other ways that you may be able to adjust your current habits to better your health. Together, we will work to improve your overall health and encourage you to meet your goals for a healthier lifestyle.    What we expect from YOU:    You will need to take blood pressure readings multiple times a week and no less than one reading per week.   It is important that you take your measurements at different times during the day, when possible.     What you should expect from your Digital Medicine Care Team:   We will provide you with education about high blood pressure, including lifestyle changes that could help you to control your blood pressure.   We will review your weekly readings and provide you with monthly blood pressure progress reports after you have been in the program for more than 30 days.   We will send monthly progress reports on your blood pressure control to your physician so they can follow along with your progress as well.    You will be able to reach me by phone at [phone] or through your [facility] account by clicking my name under Care Team on the right side of the home screen.    I look forward to working with you to achieve your blood pressure goals!    Sincerely,    Cindy Domingo PharmD  Your personal clinician    Please visit  [email or web address] to learn more about high blood pressure and what you can do lower your blood pressure.                                                                                           Luciano Henriquez Jr.  [address] [address]

## 2018-09-27 NOTE — PROGRESS NOTES
Pt states that he does not have time to complete health  intro/enrollment call at this time, but would like help understanding why he is getting text alerts that we are not getting his readings - asked pt to log in to [facility] and I refreshed and readings came over into chart.    Recommended that pt review HTN webpage and determine what area of lifestyle they would like to focus on and we will discuss in more detail at our next call.    Mr. Luciano Henriquez Jr. is a 68 y.o. male who is newly enrolled in the Digital Medicine Hypertension Clinic.     The following information was reviewed/updated:  Preferred pharmacy   [facility] [place] - [address]  [address]  [place] [address]  Phone: [phone] Fax: [phone]    [facility] - [place], [place] - [address]  [address]  [address]  [place] [address]  Phone: [phone] Fax: [phone]      Patient prefers a 90 days supply    HYPERTENSION    Explained that we expect patient to obtain several blood pressures per week at random times of day.   Our goal is to get  BP to consistently below 130/80mmHg and make the process convenient so patient can avoid extra trips to the office. Getting your blood pressure below 130/80mmHg (definition of control) will reduce your risk for heart attack, kidney failure, stroke and death (as well as kidney failure, eye disease, & dementia).     Patient is not meeting the goal already. Current BP average 144/82 mmHg.  When asked what the patient thinks is causing BP to be elevated, he states: that it may be because he is not waiting an hour or more after coffee and BP meds to take his readings.    Discussed appropriate BP measuring technique:  Before taking your blood pressure, find a quiet place. You will need to listen for your heartbeat.  Roll up the sleeve on your left arm or remove any tight-sleeved clothing, if needed. (It's best to  take your blood pressure from your left arm if you are right-handed.You can use the other arm if you have been told by your health care provider to do so.)  Rest in a chair next to a table for 5 to 10 minutes. (Your left arm should rest comfortably at heart level.)  Sit up straight with your back against the chair, legs uncrossed and on the ground.  Rest your forearm on the table with the palm of your hand facing up.  You should not talk, read the newspaper, or watch television during this process.    Last 5 Patient Entered Readings                                      Current 30 Day Average: 144/82     Recent Readings 9/19/2018 9/19/2018 9/18/2018 9/18/2018 9/18/2018    SBP (mmHg) 140 148 148 153 142    DBP (mmHg) 83 89 81 82 87    Pulse 70 71 74 77 76           Instructed patient not to allow anyone else to use phone and BP cuff.   I'm not available for emergencies. Patient will call [facility] on-call ([phone] or [phone]) or [phone] if needed.     Patient and I agreed that he will continue to monitor blood pressure and sodium intake and continue to remain adherent to medications.     I will plan to follow-up with the patient in 4 weeks.   Emailed patient link to [facility] webpages and my contact information in case he has any questions.

## 2018-10-11 ENCOUNTER — PATIENT OUTREACH (OUTPATIENT)
Dept: OTHER | Facility: OTHER | Age: 68
End: 2018-10-11

## 2018-10-11 DIAGNOSIS — I10 ESSENTIAL HYPERTENSION: Primary | ICD-10-CM

## 2018-10-11 RX ORDER — CHLORTHALIDONE 25 MG/1
25 TABLET ORAL DAILY
Qty: 30 TABLET | Refills: 3 | Status: SHIPPED | OUTPATIENT
Start: 2018-10-11 | End: 2019-01-22 | Stop reason: SDUPTHER

## 2018-10-11 NOTE — PROGRESS NOTES
Last 5 Patient Entered Readings                                      Current 30 Day Average: 148/83     Recent Readings 10/8/2018 10/8/2018 10/8/2018 10/8/2018 10/6/2018    SBP (mmHg) 149 165 153 154 141    DBP (mmHg) 82 93 87 88 77    Pulse 69 76 69 71 63        Called patient to introduce him to the Hypertension Digital Medicine Program.     Mr. Solomon BP average is above goal. Confirmed he is using proper technique to check his BP. He does state he has not been exercising regularly, but plans to start soon. Will change HCTZ 25 mg to chlorthalidone 25 mg QD. Will check BMP in 1-2 weeks.     Reviewed patient's medications and verified allergies on file.   Hypertension Medications             amLODIPine (NORVASC) 10 MG tablet Take 1 tablet (10 mg total) by mouth once daily.    chlorthalidone (HYGROTEN) 25 MG Tab Take 1 tablet (25 mg total) by mouth once daily.    olmesartan (BENICAR) 40 MG tablet Take 1 tablet (40 mg total) by mouth once daily.        Explained that we expect him to obtain several blood pressures/week at random times of day. Also asked that the BP be taken at least 1 hour after taking BP medications.     Explained that our goal is to get his BP to consistently below 130/80mmHg.     Patient and I agreed that the patient will take his BP daily to every other day at varying times of the day.     Emailed patient link to [facility] webpage as well as my direct phone number in case he has any questions.

## 2018-10-17 ENCOUNTER — TELEPHONE (OUTPATIENT)
Dept: SURGERY | Facility: CLINIC | Age: 68
End: 2018-10-17

## 2018-10-23 ENCOUNTER — CLINICAL SUPPORT (OUTPATIENT)
Dept: FAMILY MEDICINE | Facility: CLINIC | Age: 68
End: 2018-10-23
Payer: MEDICARE

## 2018-10-23 ENCOUNTER — LAB VISIT (OUTPATIENT)
Dept: LAB | Facility: HOSPITAL | Age: 68
End: 2018-10-23
Payer: MEDICARE

## 2018-10-23 DIAGNOSIS — I10 ESSENTIAL HYPERTENSION: Primary | ICD-10-CM

## 2018-10-23 DIAGNOSIS — I10 ESSENTIAL HYPERTENSION: ICD-10-CM

## 2018-10-23 DIAGNOSIS — Z23 NEED FOR TDAP VACCINATION: Primary | ICD-10-CM

## 2018-10-23 LAB
ANION GAP SERPL CALC-SCNC: 6 MMOL/L
BUN SERPL-MCNC: 22 MG/DL
CALCIUM SERPL-MCNC: 9.9 MG/DL
CHLORIDE SERPL-SCNC: 102 MMOL/L
CO2 SERPL-SCNC: 31 MMOL/L
CREAT SERPL-MCNC: 1.3 MG/DL
EST. GFR  (AFRICAN AMERICAN): >60 ML/MIN/1.73 M^2
EST. GFR  (NON AFRICAN AMERICAN): 56 ML/MIN/1.73 M^2
GLUCOSE SERPL-MCNC: 110 MG/DL
POTASSIUM SERPL-SCNC: 3.5 MMOL/L
SODIUM SERPL-SCNC: 139 MMOL/L

## 2018-10-23 PROCEDURE — 80048 BASIC METABOLIC PNL TOTAL CA: CPT

## 2018-10-23 PROCEDURE — 36415 COLL VENOUS BLD VENIPUNCTURE: CPT | Mod: PN

## 2018-10-23 NOTE — PROGRESS NOTES
Dr. Stewart made note of cost to pt to have tdap done at the office as opposed to having it done at another facility. Pt aware of cost and opted to have it done outside [facility].

## 2018-10-25 ENCOUNTER — PATIENT OUTREACH (OUTPATIENT)
Dept: OTHER | Facility: OTHER | Age: 68
End: 2018-10-25

## 2018-10-25 NOTE — PROGRESS NOTES
Last 5 Patient Entered Readings                                      Current 30 Day Average: 138/82     Recent Readings 11/7/2018 11/6/2018 11/3/2018 11/3/2018 11/3/2018    SBP (mmHg) 133 139 140 153 146    DBP (mmHg) 75 81 76 80 80    Pulse 66 70 65 68 68        Mr. Henriquez's BP average is improving. He has started to check his BP more often. Mrs. Henriquez states that he is out right now, but she will let him know that I called.     Patient's BP average is above goal of <130/80.     Will continue to monitor regularly. Will follow up in 3-4 weeks, sooner if BP begins to trend upward or downward.    Patient has my contact information and knows to call with any concerns or clinical changes.     Current HTN regimen:  Hypertension Medications             amLODIPine (NORVASC) 10 MG tablet Take 1 tablet (10 mg total) by mouth once daily.    chlorthalidone (HYGROTEN) 25 MG Tab Take 1 tablet (25 mg total) by mouth once daily.    olmesartan (BENICAR) 40 MG tablet Take 1 tablet (40 mg total) by mouth once daily.

## 2018-10-25 NOTE — PROGRESS NOTES
Last 5 Patient Entered Readings                                      Current 30 Day Average: 147/83     Recent Readings 10/23/2018 10/16/2018 10/16/2018 10/16/2018 10/16/2018    SBP (mmHg) 145 140 152 148 158    DBP (mmHg) 85 84 87 83 87    Pulse 74 72 66 66 70          Digital Medicine: Health  Follow Up    Lifestyle Modifications:    1.Dietary Modifications (Sodium intake <2,000mg/day, food labels, dining out): Pt reports not focusing on his diet too much right now, but that he does not feel like he overeats and that his wife has diabetes that is well controlled and he eats what she eats. Will f/u on this more in future encounters.    2.Physical Activity: Pt reports trying to get back to riding his stationary bike, and would like to ride 30 min 5 times a week. States that he has not been able to reach this goal yet, but has been successful with riding his bike for 30 min 3 times in the last 2 weeks. Congratulated pt for this accomplishment and encouraged to gradually increase frequency over the next month.     3.Medication Therapy: Patient has been compliant with the medication regimen.    4.Patient has the following medication side effects/concerns: None.  (Frequency/Alleviating factors/Precipitating factors, etc.)     Follow up with Mr. Luciano Henriquez Jr. completed. No further questions or concerns. Will continue to follow up to achieve health goals.

## 2018-11-28 ENCOUNTER — PATIENT OUTREACH (OUTPATIENT)
Dept: OTHER | Facility: OTHER | Age: 68
End: 2018-11-28

## 2018-11-28 NOTE — PROGRESS NOTES
"Last 5 Patient Entered Readings                                      Current 30 Day Average: 137/81     Recent Readings 11/25/2018 11/25/2018 11/25/2018 11/25/2018 11/16/2018    SBP (mmHg) 143 154 147 153 134    DBP (mmHg) 87 86 74 86 85    Pulse 62 62 61 65 72          Digital Medicine: Health  Follow Up    Lifestyle Modifications:    1.Dietary Modifications (Sodium intake <2,000mg/day, food labels, dining out): Pt reports continuing to maintain a low sodium diet by "eating what my wife makes". Pt wife is in [facility] and does well with maintaining a low sodium diet.    2.Physical Activity: Pt states that he has not been consistent with riding his stationary bike since our last encounter, but that he is planning to start back soon. Discussed starting slow and gradually increasing duration and frequency. Also discussed tips to help with motivation.    3.Medication Therapy: Patient has been compliant with the medication regimen.    4.Patient has the following medication side effects/concerns: None.  (Frequency/Alleviating factors/Precipitating factors, etc.)     Follow up with Mr. Luciano Henriquez Jr. completed. No further questions or concerns. Will continue to follow up to achieve health goals.    "

## 2018-12-06 ENCOUNTER — OFFICE VISIT (OUTPATIENT)
Dept: FAMILY MEDICINE | Facility: CLINIC | Age: 68
End: 2018-12-06
Payer: MEDICARE

## 2018-12-06 VITALS
HEIGHT: 65 IN | DIASTOLIC BLOOD PRESSURE: 86 MMHG | RESPIRATION RATE: 14 BRPM | HEART RATE: 75 BPM | OXYGEN SATURATION: 97 % | SYSTOLIC BLOOD PRESSURE: 130 MMHG | TEMPERATURE: 99 F | BODY MASS INDEX: 35.67 KG/M2 | WEIGHT: 214.06 LBS

## 2018-12-06 DIAGNOSIS — S39.012A LUMBAR STRAIN, INITIAL ENCOUNTER: Primary | ICD-10-CM

## 2018-12-06 PROCEDURE — 99999 PR PBB SHADOW E&M-EST. PATIENT-LVL III: CPT | Mod: PBBFAC,,, | Performed by: FAMILY MEDICINE

## 2018-12-06 PROCEDURE — 99213 OFFICE O/P EST LOW 20 MIN: CPT | Mod: PBBFAC,PN | Performed by: FAMILY MEDICINE

## 2018-12-06 PROCEDURE — 99214 OFFICE O/P EST MOD 30 MIN: CPT | Mod: S$PBB,,, | Performed by: FAMILY MEDICINE

## 2018-12-06 RX ORDER — BACLOFEN 10 MG/1
10 TABLET ORAL 2 TIMES DAILY
Qty: 60 TABLET | Refills: 0 | Status: SHIPPED | OUTPATIENT
Start: 2018-12-06 | End: 2019-03-04 | Stop reason: SDUPTHER

## 2018-12-06 RX ORDER — TETANUS TOXOID, REDUCED DIPHTHERIA TOXOID AND ACELLULAR PERTUSSIS VACCINE, ADSORBED 5; 2.5; 8; 8; 2.5 [IU]/.5ML; [IU]/.5ML; UG/.5ML; UG/.5ML; UG/.5ML
SUSPENSION INTRAMUSCULAR
COMMUNITY
Start: 2018-10-23

## 2018-12-06 NOTE — PROGRESS NOTES
Routine Office Visit    Patient Name: Luciano Henriquez Jr.    : 1950  MRN: [id number]    Subjective:  Luciano is a 68 y.o. male who presents today for:    1. Left back pain  Patient presenting today with recurring lower back and left groin pain.  He states that it was recently exacerbated after putting [place] decorations.  There has not been any numbness, tingling or weakness.  He denies any traumatic injury to the back.  He states that the back pain feels like it is making the left groin hurt as well.  This occurred several days ago and has minimally improved with icy hot    Past Medical History  Past Medical History:   Diagnosis Date    Allergy     Hyperlipidemia     Hypertension     Reflux esophagitis        Past Surgical History  Past Surgical History:   Procedure Laterality Date    arthroscopic on left knee      CARPAL TUNNEL RELEASE      right hand only    deviated septum repair      HERNIA REPAIR Left     inguinal    REPAIR-HERNIA-LAPAROSCOPIC-INGUINAL Left 2016    Performed by Jorge Sapp MD at [facility]    VASECTOMY         Family History  Family History   Problem Relation Age of Onset    Arthritis Mother     Hyperlipidemia Mother     Hypertension Mother     Cancer Father         prostate    Stroke Father        Social History  Social History     Socioeconomic History    Marital status:      Spouse name: Not on file    Number of children: Not on file    Years of education: Not on file    Highest education level: Not on file   Social Needs    Financial resource strain: Not on file    Food insecurity - worry: Not on file    Food insecurity - inability: Not on file    Transportation needs - medical: Not on file    Transportation needs - non-medical: Not on file   Occupational History    Not on file   Tobacco Use    Smoking status: Never Smoker    Smokeless tobacco: Never Used    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     Substance  and Sexual Activity    Alcohol use: Yes     Alcohol/week: 8.4 oz     Types: 14 Glasses of wine per week     Comment: Drinks 2 glasses of wine per night    Drug use: No    Sexual activity: Yes     Partners: Female   Other Topics Concern    Not on file   Social History Narrative    Not on file       Current Medications  Current Outpatient Medications on File Prior to Visit   Medication Sig Dispense Refill    amitriptyline (ELAVIL) 100 MG tablet       amLODIPine (NORVASC) 10 MG tablet Take 1 tablet (10 mg total) by mouth once daily. 30 tablet 1    ascorbic acid (VITAMIN C) 500 MG tablet Take 500 mg by mouth 2 (two) times daily.      aspirin (ECOTRIN) 81 MG EC tablet Take 81 mg by mouth once daily.      azelastine-fluticasone (DYMISTA) 137-50 mcg/spray Spry nassal spray 1 spray by Each Nare route 2 (two) times daily. 1 Bottle 3    BOOSTRIX TDAP 2.5-8-5 Lf-mcg-Lf/0.5mL Syrg injection       chlorthalidone (HYGROTEN) 25 MG Tab Take 1 tablet (25 mg total) by mouth once daily. 30 tablet 3    doxepin (ZONALON) 5 % cream       fish oil-omega-3 fatty acids 300-1,000 mg capsule Take 2 g by mouth once daily.      multivitamin capsule Take 1 capsule by mouth once daily.      olmesartan (BENICAR) 40 MG tablet Take 1 tablet (40 mg total) by mouth once daily. 90 tablet 3    rosuvastatin (CRESTOR) 10 MG tablet Take 1 tablet (10 mg total) by mouth once daily. 90 tablet 3    sucralfate (CARAFATE) 1 gram tablet Take 1 tablet (1 g total) by mouth 4 (four) times daily before meals and nightly. 360 tablet 2     No current facility-administered medications on file prior to visit.        Allergies   Review of patient's allergies indicates:  No Known Allergies    Review of Systems (Pertinent positives)  Review of Systems   Constitutional: Negative for fever and weight loss.   HENT: Negative.    Eyes: Negative.    Respiratory: Negative.    Cardiovascular: Negative for chest pain.   Gastrointestinal: Negative for abdominal  "pain.   Genitourinary: Negative for dysuria and hematuria.   Musculoskeletal: Positive for back pain and myalgias.   Neurological: Negative for tingling, weakness and headaches.         /86 (BP Location: Left arm, Patient Position: Sitting, BP Method: Medium (Manual))   Pulse 75   Temp 98.8 °F (37.1 °C) (Oral)   Resp 14   Ht 5' 5" (1.651 m)   Wt 97.1 kg (214 lb 1.1 oz)   SpO2 97%   BMI 35.62 kg/m²     GENERAL APPEARANCE: in no apparent distress and well developed and well nourished  HEENT: PERRLA, EOMI, Sclera clear, anicteric, Oropharynx clear, no lesions, Neck supple with midline trachea  NECK: normal, supple, no adenopathy, thyroid normal in size  RESPIRATORY: appears well, vitals normal, no respiratory distress, acyanotic, normal RR, chest clear, no wheezing, crepitations, rhonchi, normal symmetric air entry  HEART: regular rate and rhythm, S1, S2 normal, no murmur, click, rub or gallop.    ABDOMEN: abdomen is soft without tenderness, no masses, no hernias, no organomegaly, no rebound, no guarding. Suprapubic tenderness absent. No CVA tenderness.  NEUROLOGIC:  Strength 5/5 in all extremities; sensation in tact  Extremities: warm/well perfused.  No abnormal hair patterns.  No clubbing, cyanosis or edema.  no muscular tenderness noted, full range of motion without pain.  no joint tenderness, deformity or swelling noted.   SKIN: no rashes, no wounds, no other lesions  PSYCH: Alert, oriented x 3, thought content appropriate, speech normal, pleasant and cooperative, good eye contact, well groomed    Assessment/Plan:  Luciano Henriquez Jr. is a 68 y.o. male who presents today for :    Luciano was seen today for back pain and groin pain.    Diagnoses and all orders for this visit:    Lumbar strain, initial encounter  -     baclofen (LIORESAL) 10 MG tablet; Take 1 tablet (10 mg total) by mouth 2 (two) times daily.      1.  Baclofen for spasms  2.  Recommended he see ortho for recurring groin pain   3.  No " driving while on baclofen due to sedative effects  4.  He is to call/email if symptoms worsen or fail to improve      Marcelino Iglesias MD

## 2018-12-10 ENCOUNTER — PATIENT MESSAGE (OUTPATIENT)
Dept: FAMILY MEDICINE | Facility: CLINIC | Age: 68
End: 2018-12-10

## 2019-01-02 ENCOUNTER — OFFICE VISIT (OUTPATIENT)
Dept: FAMILY MEDICINE | Facility: CLINIC | Age: 69
End: 2019-01-02
Payer: MEDICARE

## 2019-01-02 VITALS
RESPIRATION RATE: 14 BRPM | TEMPERATURE: 99 F | WEIGHT: 207.69 LBS | HEIGHT: 65 IN | OXYGEN SATURATION: 94 % | SYSTOLIC BLOOD PRESSURE: 128 MMHG | BODY MASS INDEX: 34.6 KG/M2 | DIASTOLIC BLOOD PRESSURE: 82 MMHG | HEART RATE: 79 BPM

## 2019-01-02 DIAGNOSIS — B96.89 ACUTE BACTERIAL BRONCHITIS: Primary | ICD-10-CM

## 2019-01-02 DIAGNOSIS — J20.8 ACUTE BACTERIAL BRONCHITIS: Primary | ICD-10-CM

## 2019-01-02 PROCEDURE — 99999 PR PBB SHADOW E&M-EST. PATIENT-LVL IV: ICD-10-PCS | Mod: PBBFAC,,, | Performed by: FAMILY MEDICINE

## 2019-01-02 PROCEDURE — 99999 PR PBB SHADOW E&M-EST. PATIENT-LVL IV: CPT | Mod: PBBFAC,,, | Performed by: FAMILY MEDICINE

## 2019-01-02 PROCEDURE — 99214 OFFICE O/P EST MOD 30 MIN: CPT | Mod: S$PBB,,, | Performed by: FAMILY MEDICINE

## 2019-01-02 PROCEDURE — 99214 PR OFFICE/OUTPT VISIT, EST, LEVL IV, 30-39 MIN: ICD-10-PCS | Mod: S$PBB,,, | Performed by: FAMILY MEDICINE

## 2019-01-02 PROCEDURE — 99214 OFFICE O/P EST MOD 30 MIN: CPT | Mod: PBBFAC,PN | Performed by: FAMILY MEDICINE

## 2019-01-02 RX ORDER — DOXYCYCLINE 100 MG/1
100 CAPSULE ORAL EVERY 12 HOURS
Qty: 20 CAPSULE | Refills: 0 | Status: SHIPPED | OUTPATIENT
Start: 2019-01-02 | End: 2019-01-12

## 2019-01-02 RX ORDER — ALBUTEROL SULFATE 90 UG/1
2 AEROSOL, METERED RESPIRATORY (INHALATION) EVERY 6 HOURS PRN
Qty: 18 G | Refills: 0 | Status: SHIPPED | OUTPATIENT
Start: 2019-01-02

## 2019-01-02 RX ORDER — BENZONATATE 200 MG/1
200 CAPSULE ORAL 3 TIMES DAILY PRN
Qty: 30 CAPSULE | Refills: 0 | Status: SHIPPED | OUTPATIENT
Start: 2019-01-02 | End: 2019-01-12

## 2019-01-02 NOTE — PROGRESS NOTES
"Chief Complaint   Patient presents with    Cough     nasal and chest congestion with phlem        HPI    Luciano Henriquez Jr. is 68 y.o. male. The encounter diagnosis was Acute bacterial bronchitis.    68 year old male comes to clinic with complaint of upper respiratory symptoms.  Patient also presents with wife with similar symptoms.     He reports symptoms have been present for 5 days.  He reports most bothersome symptoms are chest tightness, "deep" cough, with large amount of discolored sputum production.  Patient reports fever of 101, resolved with Tylenol.      He denies sweats, chills, malaise, and muscle aches.  He reports only taking Mucinex-DM with minor improvement.    Review of Systems   Constitutional: Positive for fatigue and fever. Negative for activity change, chills and diaphoresis.   HENT: Positive for congestion, postnasal drip, rhinorrhea and sinus pressure. Negative for ear discharge, ear pain, sinus pain and sore throat.    Respiratory: Positive for cough, chest tightness and shortness of breath. Negative for wheezing.    Cardiovascular: Negative for chest pain.   Musculoskeletal: Negative for gait problem.   Psychiatric/Behavioral: Negative for suicidal ideas.           Current Outpatient Medications:     amitriptyline (ELAVIL) 100 MG tablet, , Disp: , Rfl:     amLODIPine (NORVASC) 10 MG tablet, Take 1 tablet (10 mg total) by mouth once daily., Disp: 30 tablet, Rfl: 1    ascorbic acid (VITAMIN C) 500 MG tablet, Take 500 mg by mouth 2 (two) times daily., Disp: , Rfl:     aspirin (ECOTRIN) 81 MG EC tablet, Take 81 mg by mouth once daily., Disp: , Rfl:     azelastine-fluticasone (DYMISTA) 137-50 mcg/spray Spry nassal spray, 1 spray by Each Nare route 2 (two) times daily., Disp: 1 Bottle, Rfl: 3    baclofen (LIORESAL) 10 MG tablet, Take 1 tablet (10 mg total) by mouth 2 (two) times daily., Disp: 60 tablet, Rfl: 0    BOOSTRIX TDAP 2.5-8-5 Lf-mcg-Lf/0.5mL Syrg injection, , Disp: , Rfl:    " " chlorthalidone (HYGROTEN) 25 MG Tab, Take 1 tablet (25 mg total) by mouth once daily., Disp: 30 tablet, Rfl: 3    doxepin (ZONALON) 5 % cream, , Disp: , Rfl:     fish oil-omega-3 fatty acids 300-1,000 mg capsule, Take 2 g by mouth once daily., Disp: , Rfl:     multivitamin capsule, Take 1 capsule by mouth once daily., Disp: , Rfl:     olmesartan (BENICAR) 40 MG tablet, Take 1 tablet (40 mg total) by mouth once daily., Disp: 90 tablet, Rfl: 3    rosuvastatin (CRESTOR) 10 MG tablet, Take 1 tablet (10 mg total) by mouth once daily., Disp: 90 tablet, Rfl: 3    sucralfate (CARAFATE) 1 gram tablet, Take 1 tablet (1 g total) by mouth 4 (four) times daily before meals and nightly., Disp: 360 tablet, Rfl: 2    albuterol (VENTOLIN HFA) 90 mcg/actuation inhaler, Inhale 2 puffs into the lungs every 6 (six) hours as needed for Wheezing or Shortness of Breath. Rescue, Disp: 18 g, Rfl: 0    benzonatate (TESSALON) 200 MG capsule, Take 1 capsule (200 mg total) by mouth 3 (three) times daily as needed for Cough., Disp: 30 capsule, Rfl: 0    doxycycline (VIBRAMYCIN) 100 MG Cap, Take 1 capsule (100 mg total) by mouth every 12 (twelve) hours. for 10 days, Disp: 20 capsule, Rfl: 0      Blood pressure 128/82, pulse 79, temperature 98.8 °F (37.1 °C), temperature source Oral, resp. rate 14, height 5' 5" (1.651 m), weight 94.2 kg (207 lb 10.8 oz), SpO2 (!) 94 %.    Physical Exam   Constitutional: Vital signs are normal. He appears well-developed and well-nourished. He appears ill. No distress.   HENT:   Head: Head is without right periorbital erythema and without left periorbital erythema.   Right Ear: No tenderness. Tympanic membrane is bulging. No middle ear effusion.   Left Ear: No tenderness. Tympanic membrane is bulging.  No middle ear effusion.   Nose: Nose normal.   Mouth/Throat: Oropharyngeal exudate and posterior oropharyngeal erythema present.   Cardiovascular: Normal heart sounds.   No murmur heard.  Pulmonary/Chest: " Effort normal. He has decreased breath sounds in the right upper field and the left upper field. He has no wheezes. He has no rhonchi. He has no rales.   Psychiatric: He has a normal mood and affect. His behavior is normal.       Lab Visit on 10/23/2018   Component Date Value Ref Range Status    Sodium 10/23/2018 139  136 - 145 mmol/L Final    Potassium 10/23/2018 3.5  3.5 - 5.1 mmol/L Final    Chloride 10/23/2018 102  95 - 110 mmol/L Final    CO2 10/23/2018 31* 23 - 29 mmol/L Final    Glucose 10/23/2018 110  70 - 110 mg/dL Final    BUN, Bld 10/23/2018 22  8 - 23 mg/dL Final    Creatinine 10/23/2018 1.3  0.5 - 1.4 mg/dL Final    Calcium 10/23/2018 9.9  8.7 - 10.5 mg/dL Final    Anion Gap 10/23/2018 6* 8 - 16 mmol/L Final    eGFR if African American 10/23/2018 >60  >60 mL/min/1.73 m^2 Final    eGFR if non  10/23/2018 56* >60 mL/min/1.73 m^2 Final   ]    Assessment:    1. Acute bacterial bronchitis          Luciano was seen today for cough.    Diagnoses and all orders for this visit:    Acute bacterial bronchitis  -     doxycycline (VIBRAMYCIN) 100 MG Cap; Take 1 capsule (100 mg total) by mouth every 12 (twelve) hours. for 10 days  -     albuterol (VENTOLIN HFA) 90 mcg/actuation inhaler; Inhale 2 puffs into the lungs every 6 (six) hours as needed for Wheezing or Shortness of Breath. Rescue  -     benzonatate (TESSALON) 200 MG capsule; Take 1 capsule (200 mg total) by mouth 3 (three) times daily as needed for Cough.  - New problem. Symptoms most consistent with bronchitis, treat with antibiotics with due to fever and severity of symptoms.  - Return to clinic in 7-10 days if no improvement or sooner if worsening.          FOLLOW UP: Follow-up in about 1 week (around 1/9/2019), or if symptoms worsen or fail to improve.

## 2019-01-02 NOTE — PATIENT INSTRUCTIONS
Bronchitis, Antibiotic Treatment (Adult)    Bronchitis is an infection of the air passages (bronchial tubes) in your lungs. It often occurs when you have a cold. This illness is contagious during the first few days and is spread through the air by coughing and sneezing, or by direct contact (touching the sick person and then touching your own eyes, nose, or mouth).  Symptoms of bronchitis include cough with mucus (phlegm) and low-grade fever. Bronchitis usually lasts 7 to 14 days. Mild cases can be treated with simple home remedies. More severe infection is treated with an antibiotic.  Home care  Follow these guidelines when caring for yourself at home:  · If your symptoms are severe, rest at home for the first 2 to 3 days. When you go back to your usual activities, don't let yourself get too tired.  · Do not smoke. Also avoid being exposed to secondhand smoke.  · You may use over-the-counter medicines to control fever or pain, unless another medicine was prescribed. (Note: If you have chronic liver or kidney disease or have ever had a stomach ulcer or gastrointestinal bleeding, talk with your healthcare provider before using these medicines. Also talk to your provider if you are taking medicine to prevent blood clots.) Aspirin should never be given to anyone younger than 18 years of age who is ill with a viral infection or fever. It may cause severe liver or brain damage.  · Your appetite may be poor, so a light diet is fine. Avoid dehydration by drinking 6 to 8 glasses of fluids per day (such as water, soft drinks, sports drinks, juices, tea, or soup). Extra fluids will help loosen secretions in the nose and lungs.  · Over-the-counter cough, cold, and sore-throat medicines will not shorten the length of the illness, but they may be helpful to reduce symptoms. (Note: Do not use decongestants if you have high blood pressure.)  · Finish all antibiotic medicine. Do this even if you are feeling better after only a  few days.  Follow-up care  Follow up with your healthcare provider, or as advised. If you had an X-ray or ECG (electrocardiogram), a specialist will review it. You will be notified of any new findings that may affect your care.  Note: If you are age 65 or older, or if you have a chronic lung disease or condition that affects your immune system, or you smoke, talk to your healthcare provider about having pneumococcal vaccinations and a yearly influenza vaccination (flu shot).  When to seek medical advice  Call your healthcare provider right away if any of these occur:  · Fever of 100.4°F (38°C) or higher  · Coughing up increased amounts of colored sputum  · Weakness, drowsiness, headache, facial pain, ear pain, or a stiff neck  Call 911, or get immediate medical care  Contact emergency services right away if any of these occur.  · Coughing up blood  · Worsening weakness, drowsiness, headache, or stiff neck  · Trouble breathing, wheezing, or pain with breathing  Date Last Reviewed: 9/13/2015  © 9431-1205 [employer]. [address] [place] [address], [place] [address]. All rights reserved. This information is not intended as a substitute for professional medical care. Always follow your healthcare professional's instructions.

## 2019-01-03 ENCOUNTER — PATIENT OUTREACH (OUTPATIENT)
Dept: OTHER | Facility: OTHER | Age: 69
End: 2019-01-03

## 2019-01-03 NOTE — PROGRESS NOTES
Last 5 Patient Entered Readings                                      Current 30 Day Average: 133/80     Recent Readings 12/28/2018 12/17/2018 12/10/2018 12/6/2018 12/6/2018    SBP (mmHg) 138 130 128 136 145    DBP (mmHg) 80 77 76 83 86    Pulse 69 67 78 71 74        Patient's BP average is above goal of <130/80.     Mrs. Henriquez informed me that she and Mr. Henriquez both have bronchitis. They are taking doxycycline for 10 days. Will follow up once they have completed antibiotics. Will discuss starting carvedilol or changing olmesartan to irbesartan.     Will continue to monitor regularly. Will follow up in 3-4 weeks, sooner if BP begins to trend upward or downward.    Patient has my contact information and knows to call with any concerns or clinical changes.     Current HTN regimen:  Hypertension Medications             amLODIPine (NORVASC) 10 MG tablet Take 1 tablet (10 mg total) by mouth once daily.    chlorthalidone (HYGROTEN) 25 MG Tab Take 1 tablet (25 mg total) by mouth once daily.    olmesartan (BENICAR) 40 MG tablet Take 1 tablet (40 mg total) by mouth once daily.

## 2019-01-09 ENCOUNTER — PATIENT OUTREACH (OUTPATIENT)
Dept: OTHER | Facility: OTHER | Age: 69
End: 2019-01-09

## 2019-01-09 NOTE — PROGRESS NOTES
Last 5 Patient Entered Readings                                      Current 30 Day Average: 136/80     Recent Readings 1/9/2019 1/9/2019 1/8/2019 12/28/2018 12/17/2018    SBP (mmHg) 143 142 141 138 130    DBP (mmHg) 81 84 83 80 77    Pulse 66 65 71 69 67        Mrs. Henriquez reports that she and Mr. Henriquez are both suffering with bronchitis and are still taking Mucinex and antibiotics.    Digital Medicine: Health  Follow Up    Lifestyle Modifications:    1.Dietary Modifications (Sodium intake <2,000mg/day, food labels, dining out): Pt reports no change in diet since last encounter.    2.Physical Activity: Deferred.    3.Medication Therapy: Patient has been compliant with the medication regimen.    4.Patient has the following medication side effects/concerns: None.  (Frequency/Alleviating factors/Precipitating factors, etc.)     Follow up with Mr. Luciano Delarosadeysi Gonzalez. completed. No further questions or concerns. Will continue to follow up to achieve health goals.

## 2019-01-22 ENCOUNTER — TELEPHONE (OUTPATIENT)
Dept: FAMILY MEDICINE | Facility: CLINIC | Age: 69
End: 2019-01-22

## 2019-01-22 DIAGNOSIS — I10 ESSENTIAL HYPERTENSION: ICD-10-CM

## 2019-01-22 RX ORDER — CHLORTHALIDONE 25 MG/1
25 TABLET ORAL DAILY
Qty: 90 TABLET | Refills: 3 | Status: SHIPPED | OUTPATIENT
Start: 2019-01-22 | End: 2020-01-13 | Stop reason: SDUPTHER

## 2019-01-22 NOTE — TELEPHONE ENCOUNTER
----- Message from Miranda Godwin sent at 1/22/2019 11:47 AM CST -----  Contact: self- [phone]  Refill request for ---chlorthalidone (HYGROTEN) 25 MG Tab-- for a 90 day supply        [facility] - [place], [place] - [address]  [address]  [address]  [facility] [address]  Phone: [phone] Fax: [phone]

## 2019-02-08 ENCOUNTER — PATIENT OUTREACH (OUTPATIENT)
Dept: OTHER | Facility: OTHER | Age: 69
End: 2019-02-08

## 2019-02-08 DIAGNOSIS — I10 ESSENTIAL HYPERTENSION: Primary | ICD-10-CM

## 2019-02-08 RX ORDER — VALSARTAN 320 MG/1
320 TABLET ORAL DAILY
Qty: 30 TABLET | Refills: 0 | Status: SHIPPED | OUTPATIENT
Start: 2019-02-08 | End: 2019-02-28 | Stop reason: SDUPTHER

## 2019-02-08 NOTE — PROGRESS NOTES
Last 5 Patient Entered Readings                                      Current 30 Day Average: 139/83     Recent Readings 2/4/2019 1/26/2019 1/26/2019 1/20/2019 1/9/2019    SBP (mmHg) 133 142 141 136 143    DBP (mmHg) 83 80 86 82 81    Pulse 66 69 73 74 66        Mr. Henriquez's BP is above goal on current regimen. He has started to exercise more and is working to get BP better controlled. Discussed adding carvedilol to current regimen, but will start with stopping olmesartan and starting valsartan instead. Asked that he check his BP more often going forward, 3-4 times/week.     Patient's BP average is above goal of <130/80.     Will continue to monitor regularly. Will follow up in 2-3 weeks, sooner if BP begins to trend upward or downward.    Patient has my contact information and knows to call with any concerns or clinical changes.     Current HTN regimen:  Hypertension Medications             amLODIPine (NORVASC) 10 MG tablet Take 1 tablet (10 mg total) by mouth once daily.    chlorthalidone (HYGROTEN) 25 MG Tab Take 1 tablet (25 mg total) by mouth once daily.    valsartan (DIOVAN) 320 MG tablet Take 1 tablet (320 mg total) by mouth once daily.

## 2019-02-20 ENCOUNTER — PATIENT OUTREACH (OUTPATIENT)
Dept: OTHER | Facility: OTHER | Age: 69
End: 2019-02-20

## 2019-02-20 NOTE — PROGRESS NOTES
Last 5 Patient Entered Readings                                      Current 30 Day Average: 133/81     Recent Readings 2/18/2019 2/15/2019 2/12/2019 2/9/2019 2/4/2019    SBP (mmHg) 134 122 134 135 133    DBP (mmHg) 82 79 80 79 83    Pulse 78 63 68 71 66        Digital Medicine: Health  Follow Up    Lifestyle Modifications:    1.Dietary Modifications (Sodium intake <2,000mg/day, food labels, dining out): Pt reports no change in diet. Continues to maintain a low sodium diet. Encouraged to continue.     2.Physical Activity: Pt reports lack of activity 2/2 a busy schedule. States that since his wife, who is also in the program, is planning to increase activity that he will try to do this with her. Encouraged to keep each other motivated.     3.Medication Therapy: Patient has been compliant with the medication regimen.    4.Patient has the following medication side effects/concerns: None.  (Frequency/Alleviating factors/Precipitating factors, etc.)     Follow up with Mr. Luciano Henriquez Jr. completed. No further questions or concerns. Will continue to follow up to achieve health goals.

## 2019-02-22 ENCOUNTER — PATIENT OUTREACH (OUTPATIENT)
Dept: OTHER | Facility: OTHER | Age: 69
End: 2019-02-22

## 2019-02-22 NOTE — PROGRESS NOTES
Last 5 Patient Entered Readings                                      Current 30 Day Average: 131/82     Recent Readings 2/26/2019 2/23/2019 2/23/2019 2/18/2019 2/15/2019    SBP (mmHg) 124 133 144 134 122    DBP (mmHg) 82 83 84 82 79    Pulse 67 69 72 78 63        Patient's BP average is approaching goal based on 2017 ACC/AHA HTN guidelines of goal BP <130/80.      Mr. Henriquez was not available to speak this morning, but I spoke to Mrs. Henriquez. She states that she and Mr. Henriquez plan to start walking soon to get BP better controlled. Mr. Henriquez is tolerating valsartan in place of olmesartan. BP is starting to improve. Explained that the next step in medication management, would be to add an additional agent for BP control.     Patient's health , Lilly Delgado, will be following up. I will continue to monitor regularly and will follow up in 1-2 months, sooner if BP begins to trend upward or downward.    Patient has my contact information and knows to call with any concerns or clinical changes.     Current HTN regimen:  Hypertension Medications             amLODIPine (NORVASC) 10 MG tablet Take 1 tablet (10 mg total) by mouth once daily.    chlorthalidone (HYGROTEN) 25 MG Tab Take 1 tablet (25 mg total) by mouth once daily.    valsartan (DIOVAN) 320 MG tablet Take 1 tablet (320 mg total) by mouth once daily.

## 2019-02-28 DIAGNOSIS — I10 ESSENTIAL HYPERTENSION: ICD-10-CM

## 2019-02-28 RX ORDER — VALSARTAN 320 MG/1
320 TABLET ORAL DAILY
Qty: 90 TABLET | Refills: 3 | Status: SHIPPED | OUTPATIENT
Start: 2019-02-28 | End: 2019-03-21 | Stop reason: ALTCHOICE

## 2019-02-28 NOTE — TELEPHONE ENCOUNTER
----- Message from Michelle Soto sent at 2/28/2019 12:56 PM CST -----  Contact: Self   Refill : valsartan (DIOVAN) 320 MG tablet    90 day supply      [facility] - [place], [place] - [address]  [address]  [address]  [facility] [address]  Phone: [phone] Fax: [phone]

## 2019-03-04 DIAGNOSIS — S39.012A LUMBAR STRAIN, INITIAL ENCOUNTER: ICD-10-CM

## 2019-03-04 RX ORDER — BACLOFEN 10 MG/1
10 TABLET ORAL 2 TIMES DAILY
Qty: 60 TABLET | Refills: 0 | Status: SHIPPED | OUTPATIENT
Start: 2019-03-04 | End: 2019-03-21 | Stop reason: SDUPTHER

## 2019-03-21 ENCOUNTER — OFFICE VISIT (OUTPATIENT)
Dept: FAMILY MEDICINE | Facility: CLINIC | Age: 69
End: 2019-03-21
Payer: MEDICARE

## 2019-03-21 VITALS
DIASTOLIC BLOOD PRESSURE: 89 MMHG | BODY MASS INDEX: 35.74 KG/M2 | TEMPERATURE: 99 F | HEIGHT: 65 IN | WEIGHT: 214.5 LBS | OXYGEN SATURATION: 96 % | SYSTOLIC BLOOD PRESSURE: 140 MMHG | HEART RATE: 74 BPM

## 2019-03-21 DIAGNOSIS — R73.03 PREDIABETES: ICD-10-CM

## 2019-03-21 DIAGNOSIS — E78.2 MIXED HYPERLIPIDEMIA: ICD-10-CM

## 2019-03-21 DIAGNOSIS — Z12.11 COLON CANCER SCREENING: ICD-10-CM

## 2019-03-21 DIAGNOSIS — I10 ESSENTIAL HYPERTENSION: ICD-10-CM

## 2019-03-21 DIAGNOSIS — Z00.00 ENCOUNTER FOR ROUTINE HISTORY AND PHYSICAL EXAM FOR MALE: Primary | ICD-10-CM

## 2019-03-21 DIAGNOSIS — M25.552 LEFT HIP PAIN: ICD-10-CM

## 2019-03-21 DIAGNOSIS — Z12.5 PROSTATE CANCER SCREENING: ICD-10-CM

## 2019-03-21 PROCEDURE — 93010 EKG 12-LEAD: ICD-10-PCS | Mod: S$PBB,,, | Performed by: INTERNAL MEDICINE

## 2019-03-21 PROCEDURE — 99999 PR PBB SHADOW E&M-EST. PATIENT-LVL IV: CPT | Mod: PBBFAC,,, | Performed by: FAMILY MEDICINE

## 2019-03-21 PROCEDURE — 99397 PR PREVENTIVE VISIT,EST,65 & OVER: ICD-10-PCS | Mod: S$PBB,,, | Performed by: FAMILY MEDICINE

## 2019-03-21 PROCEDURE — 93005 ELECTROCARDIOGRAM TRACING: CPT | Mod: PBBFAC,PN | Performed by: INTERNAL MEDICINE

## 2019-03-21 PROCEDURE — 93010 ELECTROCARDIOGRAM REPORT: CPT | Mod: S$PBB,,, | Performed by: INTERNAL MEDICINE

## 2019-03-21 PROCEDURE — 99214 OFFICE O/P EST MOD 30 MIN: CPT | Mod: PBBFAC,25,PN | Performed by: FAMILY MEDICINE

## 2019-03-21 PROCEDURE — 99999 PR PBB SHADOW E&M-EST. PATIENT-LVL IV: ICD-10-PCS | Mod: PBBFAC,,, | Performed by: FAMILY MEDICINE

## 2019-03-21 PROCEDURE — 99397 PER PM REEVAL EST PAT 65+ YR: CPT | Mod: S$PBB,,, | Performed by: FAMILY MEDICINE

## 2019-03-21 RX ORDER — AMLODIPINE BESYLATE 10 MG/1
10 TABLET ORAL DAILY
Qty: 90 TABLET | Refills: 1 | Status: SHIPPED | OUTPATIENT
Start: 2019-03-21 | End: 2019-03-21 | Stop reason: SDUPTHER

## 2019-03-21 RX ORDER — BACLOFEN 10 MG/1
10 TABLET ORAL 2 TIMES DAILY
Qty: 60 TABLET | Refills: 0 | Status: SHIPPED | OUTPATIENT
Start: 2019-03-21 | End: 2019-07-25

## 2019-03-21 RX ORDER — AZELASTINE HYDROCHLORIDE, FLUTICASONE PROPIONATE 137; 50 UG/1; UG/1
1 SPRAY, METERED NASAL 2 TIMES DAILY
Qty: 1 BOTTLE | Refills: 3 | Status: SHIPPED | OUTPATIENT
Start: 2019-03-21 | End: 2019-06-26

## 2019-03-21 RX ORDER — AMLODIPINE BESYLATE 10 MG/1
10 TABLET ORAL DAILY
Qty: 90 TABLET | Refills: 1 | Status: SHIPPED | OUTPATIENT
Start: 2019-03-21 | End: 2019-06-26 | Stop reason: SDUPTHER

## 2019-03-21 RX ORDER — ROSUVASTATIN CALCIUM 10 MG/1
10 TABLET, COATED ORAL NIGHTLY
Qty: 90 TABLET | Refills: 3 | Status: SHIPPED | OUTPATIENT
Start: 2019-03-21 | End: 2019-03-21 | Stop reason: SDUPTHER

## 2019-03-21 RX ORDER — ROSUVASTATIN CALCIUM 10 MG/1
10 TABLET, COATED ORAL NIGHTLY
Qty: 90 TABLET | Refills: 3 | Status: SHIPPED | OUTPATIENT
Start: 2019-03-21 | End: 2020-05-21 | Stop reason: SDUPTHER

## 2019-03-21 RX ORDER — OLMESARTAN MEDOXOMIL 40 MG/1
40 TABLET ORAL DAILY
Qty: 90 TABLET | Refills: 3 | Status: SHIPPED | OUTPATIENT
Start: 2019-03-21 | End: 2020-03-26 | Stop reason: SDUPTHER

## 2019-03-21 NOTE — PATIENT INSTRUCTIONS
Prevention Guidelines, Men Ages 65 and Older  Screening tests and vaccines are an important part of managing your health. Health counseling is essential, too. Below are guidelines for these, for men ages 65 and older. Talk with your healthcare provider to make sure youre up-to-date on what you need.  Screening Who needs it How often   Abdominal aortic aneurysm Men ages 65 to 75 who have ever smoked 1 ultrasound   Alcohol misuse All men in this age group At routine exams   Blood pressure All men in this age group Every 2 years if your blood pressure is less than 120/80 mm Hg; yearly if your systolic blood pressure is 120 to 139 mm Hg, or your diastolic blood pressure reading is 80 to 89 mm Hg   Colorectal cancer All men in this age group Flexible sigmoidoscopy every 5 years, or colonoscopy every 10 years, or double-contrast barium enema every 5 years; yearly fecal occult blood test or fecal immunochemical test; or a stool DNA test as often as your healthcare provider advises; talk with your healthcare provider about which tests are best for you and when you no longer need colonoscopies (generally after age 75)   Depression All men in this age group At routine exams   Type 2 diabetes or prediabetes All adults beginning at age 45 and adults without symptoms at any age who are overweight or obese and have 1 or more other risk factors for diabetes At least every 3 years (yearly if your blood sugar has already begun to rise)   Hepatitis C Men at increased risk for infection - talk with your healthcare provider At routine exams   High cholesterol or triglycerides All men in this age group At least every 5 years   HIV Men at increased risk for infection - talk with your healthcare provider At routine exams   Lung cancer Adults ages 55 to 80 who have smoked Yearly screening in smokers with 30 pack-year history of smoking or who quit within 15 years   Obesity All men in this age group At routine exams   Prostate cancer All  men in this age group, talk to healthcare provider about risks and benefits of digital rectal exam (DAVIN) and prostate-specific antigen (PSA) screening1 At routine exams   Syphilis Men at increased risk for infection - talk with your healthcare provider At routine exams   Tuberculosis Men at increased risk for infection - talk with your healthcare provider Ask your healthcare provider   Vision All men in this age group Every 1 to 2 years; if you have a chronic health condition, ask your healthcare provider if you needs exams more often   Vaccine Who needs it How often   Chickenpox (varicella) All men in this age group who have no record of this infection or vaccine 2 doses; second dose should be given at least 4 weeks after the first dose   Hepatitis A Men at increased risk for infection - talk with your healthcare provider 2 doses given at least 6 months apart   Hepatitis B Men at increased risk for infection - talk with your healthcare provider 3 doses over 6 months; second dose should be given 1 month after the first dose; the third dose should be given at least 2 months after the second dose and at least 4 months after the first dose   Haemophilus influenzae Type B (HIB) Men at increased risk for infection - talk with your healthcare provider 1 to 3 doses   Influenza (flu) All men in this age group  Once a year   Meningococcal Men at increased risk for infection - talk with your healthcare provider 1 or more doses   Pneumococcal conjugate vaccine (PCV13) and pneumococcal polysaccharide vaccine (PPSV23) All men in this age group 1 dose of each vaccine   Tetanus/diphtheria/  pertussis (Td/Tdap) booster All men in this age group Td every 10 years, or Tdap if you will have contact with a child younger than 12 months old   Zoster All men in this age group 1 dose   Counseling Who needs it How often   Diet and exercise Men who are overweight or obese When diagnosed, and then at routine exams   Fall prevention (exercise,  vitamin D supplements) All men in this age group At routine exams   Sexually transmitted infection Men at increased risk for infection - talk with your healthcare provider At routine exams   Use of daily aspirin Men ages 45 to 79 at risk for cardiovascular health problems At routine exams   Use of tobacco and the health effects it can cause All men in this age group Every visit   [address] [place] [address] [facility]   Date Last Reviewed: 2/1/2017  © 4537-2955 [employer]. [address] [place] [address], [place] [address]. All rights reserved. This information is not intended as a substitute for professional medical care. Always follow your healthcare professional's instructions.

## 2019-03-21 NOTE — PROGRESS NOTES
Chief Complaint   Patient presents with    Annual Exam       HPI    Luciano Henriquez Jr. is 68 y.o. male. The primary encounter diagnosis was Encounter for routine history and physical exam for male. Diagnoses of Essential hypertension, Prediabetes, Mixed hyperlipidemia, Prostate cancer screening, Colon cancer screening, Left hip pain, and Essential hypertension were also pertinent to this visit.     68 year old male with HTN, HLD, and Prediabetes comes to clinic for annual wellness exam.     Patient denies admits to some difficulty with blood pressure control. He notes medication changes and reports his blood pressures are less controlled. He would like to resume the Olmesartan and discontinue the Valsartan.    He also reports musculoskeletal issue present for the last 2 months.  Patient reports that pain, tension/tightness in the bilateral posterior legs Patient reports that the pain is tight, tense feeling in the posterior      Review of Systems   Constitutional: Negative for activity change and unexpected weight change.   HENT: Negative for hearing loss, rhinorrhea and trouble swallowing.    Eyes: Negative for discharge and visual disturbance.   Respiratory: Negative for chest tightness and wheezing.    Cardiovascular: Negative for chest pain and palpitations.   Gastrointestinal: Negative for blood in stool, constipation, diarrhea and vomiting.   Endocrine: Negative for polydipsia and polyuria.   Genitourinary: Negative for difficulty urinating, hematuria and urgency.   Musculoskeletal: Negative for arthralgias, joint swelling and neck pain.   Neurological: Negative for weakness and headaches.   Psychiatric/Behavioral: Negative for confusion and dysphoric mood.           Current Outpatient Medications:     amitriptyline (ELAVIL) 100 MG tablet, , Disp: , Rfl:     amLODIPine (NORVASC) 10 MG tablet, Take 1 tablet (10 mg total) by mouth once daily., Disp: 90 tablet, Rfl: 1    ascorbic acid (VITAMIN C) 500 MG  "tablet, Take 500 mg by mouth 2 (two) times daily., Disp: , Rfl:     aspirin (ECOTRIN) 81 MG EC tablet, Take 81 mg by mouth once daily., Disp: , Rfl:     azelastine-fluticasone (DYMISTA) 137-50 mcg/spray Spry nassal spray, 1 spray by Each Nare route 2 (two) times daily., Disp: 1 Bottle, Rfl: 3    baclofen (LIORESAL) 10 MG tablet, Take 1 tablet (10 mg total) by mouth 2 (two) times daily., Disp: 60 tablet, Rfl: 0    BOOSTRIX TDAP 2.5-8-5 Lf-mcg-Lf/0.5mL Syrg injection, , Disp: , Rfl:     chlorthalidone (HYGROTEN) 25 MG Tab, Take 1 tablet (25 mg total) by mouth once daily., Disp: 90 tablet, Rfl: 3    doxepin (ZONALON) 5 % cream, , Disp: , Rfl:     fish oil-omega-3 fatty acids 300-1,000 mg capsule, Take 2 g by mouth once daily., Disp: , Rfl:     multivitamin capsule, Take 1 capsule by mouth once daily., Disp: , Rfl:     rosuvastatin (CRESTOR) 10 MG tablet, Take 1 tablet (10 mg total) by mouth every evening., Disp: 90 tablet, Rfl: 3    sucralfate (CARAFATE) 1 gram tablet, Take 1 tablet (1 g total) by mouth 4 (four) times daily before meals and nightly., Disp: 360 tablet, Rfl: 2    albuterol (VENTOLIN HFA) 90 mcg/actuation inhaler, Inhale 2 puffs into the lungs every 6 (six) hours as needed for Wheezing or Shortness of Breath. Rescue, Disp: 18 g, Rfl: 0    olmesartan (BENICAR) 40 MG tablet, Take 1 tablet (40 mg total) by mouth once daily., Disp: 90 tablet, Rfl: 3      Blood pressure (!) 140/89, pulse 74, temperature 98.6 °F (37 °C), temperature source Oral, height 5' 5" (1.651 m), weight 97.3 kg (214 lb 8.1 oz), SpO2 96 %.    Physical Exam   Constitutional: Vital signs are normal. He appears well-developed and well-nourished. He does not appear ill. No distress.   HENT:   Mouth/Throat: Normal dentition.   Neck: Trachea normal. No thyromegaly present.   Cardiovascular: Normal rate, regular rhythm and intact distal pulses.   No murmur heard.  Pulmonary/Chest: Effort normal. He has no decreased breath sounds. He " has no wheezes. He exhibits no deformity.   Musculoskeletal:   Normal gait. No decreased range of motion of major joints.   Neurological: He is not disoriented.   Skin: Skin is intact. Capillary refill takes less than 2 seconds.   Psychiatric: His speech is normal and behavior is normal. His mood appears not anxious. He does not exhibit a depressed mood.       Lab Visit on 03/21/2019   Component Date Value Ref Range Status    WBC 03/21/2019 4.60  3.90 - 12.70 K/uL Final    RBC 03/21/2019 4.99  4.60 - 6.20 M/uL Final    Hemoglobin 03/21/2019 14.3  14.0 - 18.0 g/dL Final    Hematocrit 03/21/2019 43.3  40.0 - 54.0 % Final    MCV 03/21/2019 87  82 - 98 fL Final    MCH 03/21/2019 28.7  27.0 - 31.0 pg Final    MCHC 03/21/2019 33.0  32.0 - 36.0 g/dL Final    RDW 03/21/2019 15.8* 11.5 - 14.5 % Final    Platelets 03/21/2019 255  150 - 350 K/uL Final    MPV 03/21/2019 10.3  9.2 - 12.9 fL Final    Gran # (ANC) 03/21/2019 2.2  1.8 - 7.7 K/uL Final    Lymph # 03/21/2019 1.6  1.0 - 4.8 K/uL Final    Mono # 03/21/2019 0.6  0.3 - 1.0 K/uL Final    Eos # 03/21/2019 0.2  0.0 - 0.5 K/uL Final    Baso # 03/21/2019 0.02  0.00 - 0.20 K/uL Final    Gran% 03/21/2019 48.1  38.0 - 73.0 % Final    Lymph% 03/21/2019 33.9  18.0 - 48.0 % Final    Mono% 03/21/2019 13.7  4.0 - 15.0 % Final    Eosinophil% 03/21/2019 3.9  0.0 - 8.0 % Final    Basophil% 03/21/2019 0.4  0.0 - 1.9 % Final    Differential Method 03/21/2019 Automated   Final    Sodium 03/21/2019 139  136 - 145 mmol/L Final    Potassium 03/21/2019 3.6  3.5 - 5.1 mmol/L Final    Chloride 03/21/2019 101  95 - 110 mmol/L Final    CO2 03/21/2019 30* 23 - 29 mmol/L Final    Glucose 03/21/2019 99  70 - 110 mg/dL Final    BUN, Bld 03/21/2019 23  8 - 23 mg/dL Final    Creatinine 03/21/2019 1.3  0.5 - 1.4 mg/dL Final    Calcium 03/21/2019 9.9  8.7 - 10.5 mg/dL Final    Total Protein 03/21/2019 7.6  6.0 - 8.4 g/dL Final    Albumin 03/21/2019 3.9  3.5 - 5.2 g/dL Final     Total Bilirubin 03/21/2019 0.5  0.1 - 1.0 mg/dL Final    Alkaline Phosphatase 03/21/2019 43* 55 - 135 U/L Final    AST 03/21/2019 27  10 - 40 U/L Final    ALT 03/21/2019 34  10 - 44 U/L Final    Anion Gap 03/21/2019 8  8 - 16 mmol/L Final    eGFR if African American 03/21/2019 >60  >60 mL/min/1.73 m^2 Final    eGFR if non African American 03/21/2019 56* >60 mL/min/1.73 m^2 Final    Cholesterol 03/21/2019 157  120 - 199 mg/dL Final    Triglycerides 03/21/2019 74  30 - 150 mg/dL Final    HDL 03/21/2019 51  40 - 75 mg/dL Final    LDL Cholesterol 03/21/2019 91.2  63.0 - 159.0 mg/dL Final    HDL/Chol Ratio 03/21/2019 32.5  20.0 - 50.0 % Final    Total Cholesterol/HDL Ratio 03/21/2019 3.1  2.0 - 5.0 Final    Non-HDL Cholesterol 03/21/2019 106  mg/dL Final    Hemoglobin A1C 03/21/2019 6.5* 4.0 - 5.6 % Final    Estimated Avg Glucose 03/21/2019 140* 68 - 131 mg/dL Final    PSA, SCREEN 03/21/2019 0.84  0.00 - 4.00 ng/mL Final   ]    Assessment:    1. Encounter for routine history and physical exam for male    2. Essential hypertension    3. Prediabetes    4. Mixed hyperlipidemia    5. Prostate cancer screening    6. Colon cancer screening    7. Left hip pain              Luciano was seen today for annual exam.    Diagnoses and all orders for this visit:    Encounter for routine history and physical exam for male  -     Comprehensive metabolic panel; Future  -     Lipid panel; Future  -     Hemoglobin A1c; Future  - Previous labs and health maintenance reviewed with patient.  Up to date on all vaccinations.  - Annual labs ordered.    Essential hypertension  -     Discontinue: amLODIPine (NORVASC) 10 MG tablet; Take 1 tablet (10 mg total) by mouth once daily.  -     Comprehensive metabolic panel; Future  -     IN OFFICE EKG 12-LEAD (to Muse); Future  -     olmesartan (BENICAR) 40 MG tablet; Take 1 tablet (40 mg total) by mouth once daily.  -     amLODIPine (NORVASC) 10 MG tablet; Take 1 tablet (10 mg total) by  mouth once daily.  -     IN OFFICE EKG 12-LEAD (to Muse)  - Unstable. EKG within normal limits.  Medication regimen reviewed with patient.  - Valsartan discontinued. Resume Olmesartan with Chlorthalidone.    Prediabetes  -     Hemoglobin A1c; Future  - Stable.Lifestyle modifications reviewed with patient.  Repeat A1c reviewed.    Mixed hyperlipidemia  -     Discontinue: rosuvastatin (CRESTOR) 10 MG tablet; Take 1 tablet (10 mg total) by mouth every evening.  -     Lipid panel; Future  -     rosuvastatin (CRESTOR) 10 MG tablet; Take 1 tablet (10 mg total) by mouth every evening.  - Stable. Medication refilled. No change to regimen.    Prostate cancer screening  -     PSA, Screening; Future    Colon cancer screening  -     Ambulatory referral to Gastroenterology    Left hip pain  -     baclofen (LIORESAL) 10 MG tablet; Take 1 tablet (10 mg total) by mouth 2 (two) times daily.  - New problem. Discussed musculoskeletal pain. Encourage continued exercise regimen.  Stretch regimen provided to patient.       Other orders  -     Cancel: Case request GI: COLONOSCOPY  -     azelastine-fluticasone (DYMISTA) 137-50 mcg/spray Spry nassal spray; 1 spray by Each Nare route 2 (two) times daily.          FOLLOW UP: Follow-up in about 4 weeks (around 4/18/2019) for Establish care.

## 2019-03-27 ENCOUNTER — TELEPHONE (OUTPATIENT)
Dept: FAMILY MEDICINE | Facility: CLINIC | Age: 69
End: 2019-03-27

## 2019-03-27 PROBLEM — E11.9 TYPE 2 DIABETES MELLITUS WITHOUT COMPLICATION, WITHOUT LONG-TERM CURRENT USE OF INSULIN: Status: ACTIVE | Noted: 2019-03-27

## 2019-03-27 NOTE — TELEPHONE ENCOUNTER
----- Message from Luis E Barcenas sent at 3/27/2019  2:16 PM CDT -----  Contact: Sirena with [facility] [phone] Ref # [id number]  Type:  Pharmacy Calling to Clarify an RX    Name of Caller: Sirena    Pharmacy Name: [facility]    Prescription name:   olmesartan (BENICAR) 40 MG tablet  What do they need to clarify? Medication is on back order, need to see if  Can provide an alternative    Best Call Back Number: [phone]    Additional Information: N/A

## 2019-03-27 NOTE — TELEPHONE ENCOUNTER
Please contact patient and inform that he will need to take the Valsartan until the Olmesartan is available for him to take.  Patient already has valsartan.

## 2019-03-28 ENCOUNTER — TELEPHONE (OUTPATIENT)
Dept: FAMILY MEDICINE | Facility: CLINIC | Age: 69
End: 2019-03-28

## 2019-03-28 RX ORDER — SUCRALFATE 1 G/1
1 TABLET ORAL
Qty: 360 TABLET | Refills: 2 | Status: SHIPPED | OUTPATIENT
Start: 2019-03-28 | End: 2020-07-14 | Stop reason: SDUPTHER

## 2019-03-28 NOTE — TELEPHONE ENCOUNTER
Spoke with pt regarding Rx being on back order and instructed that he can take valsartan . Olmesartan is on back order .

## 2019-03-28 NOTE — TELEPHONE ENCOUNTER
----- Message from Eric Nguyen sent at 3/28/2019 10:21 AM CDT -----  Contact: Luciano Henriquez [phone]  Type: Patient Call Back    Who called:Luciano     What is the request in detail: The patient is returning a call back to Ms. Lamar in regards to a previous call. He wants to know what pharmacy the medication is being sent to, since it is on back order at his original pharmacy.    Can the clinic reply by [facility]? no    Would the patient rather a call back or a response via My [facility]? Call back    Best call back number:[phone]

## 2019-04-09 ENCOUNTER — PATIENT OUTREACH (OUTPATIENT)
Dept: OTHER | Facility: OTHER | Age: 69
End: 2019-04-09

## 2019-04-09 NOTE — PROGRESS NOTES
Last 5 Patient Entered Readings                                      Current 30 Day Average: 142/85     Recent Readings 4/9/2019 4/9/2019 4/9/2019 4/7/2019 4/7/2019    SBP (mmHg) 150 148 154 145 150    DBP (mmHg) 87 88 101 88 87    Pulse 72 72 80 70 75          Digital Medicine: Health  Follow Up    Lifestyle Modifications:    1.Dietary Modifications (Sodium intake <2,000mg/day, food labels, dining out): Pt states that he is thinking about trying Nutrisystem or some other diet to help with wt loss. Cautioned pt about sodium content of prepared foods that are offered in these types of plans. Discussed possibly focusing on portion control and healthy meal balance with choosemyplate, but pt feels that he will do better with Nutrisystem to help get him started. Recommended limiting meals to <500 mg of sodium per meal and <250 mg of sodium per snack.    2.Physical Activity: Has started riding recumbent bike for 30-45 min 5 days a week. Praised pt for implementing this regular activity and encouraged to continue.    3.Medication Therapy: Patient has been compliant with the medication regimen.    4.Patient has the following medication side effects/concerns: None.  (Frequency/Alleviating factors/Precipitating factors, etc.)     Follow up with Mr. Luciano Henriquez Jr. completed. No further questions or concerns. Will continue to follow up to achieve health goals.

## 2019-06-05 ENCOUNTER — PATIENT OUTREACH (OUTPATIENT)
Dept: OTHER | Facility: OTHER | Age: 69
End: 2019-06-05

## 2019-06-05 NOTE — PROGRESS NOTES
Last 5 Patient Entered Readings                                      Current 30 Day Average: 132/77     Recent Readings 5/29/2019 5/23/2019 5/22/2019 5/22/2019 5/18/2019    SBP (mmHg) 135 139 142 143 132    DBP (mmHg) 75 77 80 80 73    Pulse 58 61 57 59 61        Per 30 day average, 132/77 mmHg, patient's BP is approaching goal.     Mr. Henriquez's BP average has improved. He has lost about 10 lbs and is working on losing more. Dr. Stewart stopped valsartan and restarted olmesartan.     Patient's health , Lilly Delgado, will be following up. I will continue to monitor regularly and will follow up in 4-6 months, sooner if BP begins to trend upward or downward.    Asked patient to call or message with questions or concerns.     Current HTN regimen:  Hypertension Medications             amLODIPine (NORVASC) 10 MG tablet Take 1 tablet (10 mg total) by mouth once daily.    chlorthalidone (HYGROTEN) 25 MG Tab Take 1 tablet (25 mg total) by mouth once daily.    olmesartan (BENICAR) 40 MG tablet Take 1 tablet (40 mg total) by mouth once daily.

## 2019-06-18 ENCOUNTER — TELEPHONE (OUTPATIENT)
Dept: FAMILY MEDICINE | Facility: CLINIC | Age: 69
End: 2019-06-18

## 2019-06-18 NOTE — TELEPHONE ENCOUNTER
----- Message from Beth Lin sent at 6/18/2019  2:04 PM CDT -----  Contact: Self   Type: RX Refill Request    Who Called: Self     Refill or New Rx:Refill     RX Name and Strength:amLODIPine (NORVASC) 10 MG tablet      Is this a 30 day or 90 day RX:90 with 3 refills    Preferred Pharmacy with phone number:[facility] - [address] [phone] (Phone)  [phone] (Fax)    Local or Mail Order: Mail Order    Ordering Provider: Dr. Iglesias     Would the patient rather a call back or a response [facility]?  Call     Best Call Back Number:[phone]

## 2019-06-20 ENCOUNTER — PATIENT OUTREACH (OUTPATIENT)
Dept: OTHER | Facility: OTHER | Age: 69
End: 2019-06-20

## 2019-06-20 NOTE — PROGRESS NOTES
Last 5 Patient Entered Readings                                      Current 30 Day Average: 133/78     Recent Readings 6/18/2019 6/18/2019 6/15/2019 6/15/2019 6/11/2019    SBP (mmHg) 134 140 125 148 131    DBP (mmHg) 73 77 78 82 75    Pulse 59 60 62 60 59          Digital Medicine: Health  Follow Up    Called pt for f/u. States that he is currently following Nutrisystem and has great success with this. He has also been riding his stationary bike 3-4 days a week. Praised pt for maintaining healthy habits and encouraged to continue.     Follow up with Mr. Luciano Henriquez Jr. completed. No further questions or concerns. Will continue to follow up to achieve health goals.

## 2019-06-26 ENCOUNTER — OFFICE VISIT (OUTPATIENT)
Dept: FAMILY MEDICINE | Facility: CLINIC | Age: 69
End: 2019-06-26
Payer: MEDICARE

## 2019-06-26 VITALS
OXYGEN SATURATION: 96 % | DIASTOLIC BLOOD PRESSURE: 76 MMHG | HEART RATE: 67 BPM | HEIGHT: 65 IN | TEMPERATURE: 99 F | SYSTOLIC BLOOD PRESSURE: 124 MMHG | WEIGHT: 200.81 LBS | BODY MASS INDEX: 33.46 KG/M2

## 2019-06-26 DIAGNOSIS — J30.2 SEASONAL ALLERGIES: ICD-10-CM

## 2019-06-26 DIAGNOSIS — I10 BENIGN ESSENTIAL HTN: Primary | ICD-10-CM

## 2019-06-26 DIAGNOSIS — Z12.11 COLON CANCER SCREENING: ICD-10-CM

## 2019-06-26 PROCEDURE — 99213 OFFICE O/P EST LOW 20 MIN: CPT | Mod: PBBFAC,PN | Performed by: FAMILY MEDICINE

## 2019-06-26 PROCEDURE — 99214 OFFICE O/P EST MOD 30 MIN: CPT | Mod: S$PBB,,, | Performed by: FAMILY MEDICINE

## 2019-06-26 PROCEDURE — 99999 PR PBB SHADOW E&M-EST. PATIENT-LVL III: ICD-10-PCS | Mod: PBBFAC,,, | Performed by: FAMILY MEDICINE

## 2019-06-26 PROCEDURE — 99999 PR PBB SHADOW E&M-EST. PATIENT-LVL III: CPT | Mod: PBBFAC,,, | Performed by: FAMILY MEDICINE

## 2019-06-26 PROCEDURE — 99214 PR OFFICE/OUTPT VISIT, EST, LEVL IV, 30-39 MIN: ICD-10-PCS | Mod: S$PBB,,, | Performed by: FAMILY MEDICINE

## 2019-06-26 RX ORDER — AZELASTINE 1 MG/ML
1 SPRAY, METERED NASAL 2 TIMES DAILY
Qty: 30 ML | Refills: 1 | Status: SHIPPED | OUTPATIENT
Start: 2019-06-26 | End: 2020-02-10

## 2019-06-26 RX ORDER — AMLODIPINE BESYLATE 10 MG/1
10 TABLET ORAL DAILY
Qty: 90 TABLET | Refills: 3 | Status: SHIPPED | OUTPATIENT
Start: 2019-06-26 | End: 2020-05-21 | Stop reason: SDUPTHER

## 2019-07-05 NOTE — PROGRESS NOTES
Routine Office Visit    Patient Name: Luciano Henriquez Jr.    : 1950  MRN: [id number]    Subjective:  Luciano is a 69 y.o. male who presents today for:    1. htn  Patient presenting today for follow up of htn.  He states that he is taking medication as prescribed without any complication.  No chest pain, weakness, vision changes, shortness of breath, or headaches.  He states that his blood pressure has been controlled with his current regimen.      Past Medical History  Past Medical History:   Diagnosis Date    Allergy     Hyperlipidemia     Hypertension     Reflux esophagitis        Past Surgical History  Past Surgical History:   Procedure Laterality Date    arthroscopic on left knee      CARPAL TUNNEL RELEASE      right hand only    deviated septum repair      HERNIA REPAIR Left     inguinal    REPAIR-HERNIA-LAPAROSCOPIC-INGUINAL Left 2016    [facility]    VASECTOMY         Family History  Family History   Problem Relation Age of Onset    Arthritis Mother     Hyperlipidemia Mother     Hypertension Mother     Cancer Father         prostate    Stroke Father        Social History  Social History     Socioeconomic History    Marital status:      Spouse name: Not on file    Number of children: Not on file    Years of education: Not on file    Highest education level: Not on file   Occupational History    Not on file   Social Needs    Financial resource strain: Not on file    Food insecurity:     Worry: Not on file     Inability: Not on file    Transportation needs:     Medical: Not on file     Non-medical: Not on file   Tobacco Use    Smoking status: Never Smoker    Smokeless tobacco: Never Used    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     Substance and Sexual Activity    Alcohol use: Yes     Alcohol/week: 8.4 oz     Types: 14 Glasses of wine per week     Comment: Drinks 2 glasses of wine per night    Drug use: No     Sexual activity: Yes     Partners: Female   Lifestyle    Physical activity:     Days per week: Not on file     Minutes per session: Not on file    Stress: Not on file   Relationships    Social connections:     Talks on phone: Not on file     Gets together: Not on file     Attends Cheondoism service: Not on file     Active member of club or organization: Not on file     Attends meetings of clubs or organizations: Not on file     Relationship status: Not on file   Other Topics Concern    Not on file   Social History Narrative    Not on file       Current Medications  Current Outpatient Medications on File Prior to Visit   Medication Sig Dispense Refill    albuterol (VENTOLIN HFA) 90 mcg/actuation inhaler Inhale 2 puffs into the lungs every 6 (six) hours as needed for Wheezing or Shortness of Breath. Rescue 18 g 0    ascorbic acid (VITAMIN C) 500 MG tablet Take 500 mg by mouth 2 (two) times daily.      aspirin (ECOTRIN) 81 MG EC tablet Take 81 mg by mouth once daily.      baclofen (LIORESAL) 10 MG tablet Take 1 tablet (10 mg total) by mouth 2 (two) times daily. 60 tablet 0    BOOSTRIX TDAP 2.5-8-5 Lf-mcg-Lf/0.5mL Syrg injection       chlorthalidone (HYGROTEN) 25 MG Tab Take 1 tablet (25 mg total) by mouth once daily. 90 tablet 3    fish oil-omega-3 fatty acids 300-1,000 mg capsule Take 2 g by mouth once daily.      multivitamin capsule Take 1 capsule by mouth once daily.      olmesartan (BENICAR) 40 MG tablet Take 1 tablet (40 mg total) by mouth once daily. 90 tablet 3    rosuvastatin (CRESTOR) 10 MG tablet Take 1 tablet (10 mg total) by mouth every evening. 90 tablet 3    sucralfate (CARAFATE) 1 gram tablet Take 1 tablet (1 g total) by mouth 4 (four) times daily before meals and nightly. 360 tablet 2    amitriptyline (ELAVIL) 100 MG tablet       doxepin (ZONALON) 5 % cream        No current facility-administered medications on file prior to visit.        Allergies   Review of patient's allergies  "indicates:  No Known Allergies    Review of Systems (Pertinent positives)  Review of Systems   Constitutional: Negative for malaise/fatigue.   HENT: Negative.    Eyes: Negative for blurred vision.   Respiratory: Negative for shortness of breath.    Cardiovascular: Negative for chest pain, palpitations, orthopnea and PND.   Gastrointestinal: Negative.    Musculoskeletal: Negative for neck pain.   Skin: Negative.    Neurological: Negative for tingling, speech change, focal weakness and headaches.         /76   Pulse 67   Temp 98.6 °F (37 °C) (Oral)   Ht 5' 5" (1.651 m)   Wt 91.1 kg (200 lb 13.4 oz)   SpO2 96%   BMI 33.42 kg/m²     GENERAL APPEARANCE: in no apparent distress and well developed and well nourished  HEENT: PERRL, EOMI, Sclera clear, anicteric, Oropharynx clear, no lesions, Neck supple with midline trachea  NECK: normal, supple, no adenopathy, thyroid normal in size  RESPIRATORY: appears well, vitals normal, no respiratory distress, acyanotic, normal RR, chest clear, no wheezing, crepitations, rhonchi, normal symmetric air entry  HEART: regular rate and rhythm, S1, S2 normal, no murmur, click, rub or gallop.    ABDOMEN: abdomen is soft without tenderness, no masses, no hernias, no organomegaly, no rebound, no guarding. Suprapubic tenderness absent. No CVA tenderness.  NEUROLOGIC: normal without focal findings, CN II-XII are intact.    SKIN: no rashes, no wounds, no other lesions  PSYCH: Alert, oriented x 3, thought content appropriate, speech normal, pleasant and cooperative, good eye contact, well groomed.    Assessment/Plan:  Luciano Henriquez Jr. is a 69 y.o. male who presents today for :    Luciano was seen today for medication refill.    Diagnoses and all orders for this visit:    Benign essential HTN  -     amLODIPine (NORVASC) 10 MG tablet; Take 1 tablet (10 mg total) by mouth once daily.    Seasonal allergies  -     azelastine (ASTELIN) 137 mcg (0.1 %) nasal spray; 1 spray (137 mcg " total) by Nasal route 2 (two) times daily.    Colon cancer screening  -     Case request GI: COLONOSCOPY      1.  Medications refilled  2.  htn stable  3.  Referred to colonoscopy as he is due  4.  Follow up 3 months or sooner if needed      Marcelino Iglesias MD

## 2019-07-23 ENCOUNTER — TELEPHONE (OUTPATIENT)
Dept: FAMILY MEDICINE | Facility: CLINIC | Age: 69
End: 2019-07-23

## 2019-07-23 ENCOUNTER — PATIENT MESSAGE (OUTPATIENT)
Dept: ADMINISTRATIVE | Facility: OTHER | Age: 69
End: 2019-07-23

## 2019-07-23 ENCOUNTER — PATIENT MESSAGE (OUTPATIENT)
Dept: FAMILY MEDICINE | Facility: CLINIC | Age: 69
End: 2019-07-23

## 2019-07-23 NOTE — TELEPHONE ENCOUNTER
I spoke to the pt and he reports that he saw PCP in June and he was having some back pain and he was told to continue muscle relaxer and report back to the PCP in 4-5 days to PCP regarding his sx. The pt did not call back as he felt he was getting better. Pt now reports lower back pain that radiated to his thighs. Pt c/o that the pain is severe in the mornings but does get better as the day progresses. He does report that it effects his bending and walking. Pt states that PCP said that pt might need an MRI and he is inquiring if that is needed? He scheduled a follow up appointment for 7/31/2019 but he reports he doesn't think he can wait that long. Please advise.

## 2019-07-23 NOTE — TELEPHONE ENCOUNTER
----- Message from Tosha Hernandez sent at 7/23/2019  2:51 PM CDT -----  Contact: pt  Name of Who is Calling: ELIN HENDRICKS JR. [[id number]]       What is the request in detail: Patient is requesting a call from staff in regards to rescheduling for a sooner appointment he wants to know if he is to take a MRI and would like to speak with staff  .....Please contact to further discuss and advise.     Can the clinic reply by [facility]: yes     What Number to Call Back [facility]: [phone]

## 2019-07-25 ENCOUNTER — OFFICE VISIT (OUTPATIENT)
Dept: FAMILY MEDICINE | Facility: CLINIC | Age: 69
End: 2019-07-25
Payer: MEDICARE

## 2019-07-25 VITALS
HEIGHT: 65 IN | WEIGHT: 201.06 LBS | HEART RATE: 67 BPM | RESPIRATION RATE: 16 BRPM | DIASTOLIC BLOOD PRESSURE: 74 MMHG | BODY MASS INDEX: 33.5 KG/M2 | TEMPERATURE: 99 F | OXYGEN SATURATION: 95 % | SYSTOLIC BLOOD PRESSURE: 138 MMHG

## 2019-07-25 DIAGNOSIS — M54.42 CHRONIC MIDLINE LOW BACK PAIN WITH BILATERAL SCIATICA: Primary | ICD-10-CM

## 2019-07-25 DIAGNOSIS — G89.29 CHRONIC MIDLINE LOW BACK PAIN WITH BILATERAL SCIATICA: Primary | ICD-10-CM

## 2019-07-25 DIAGNOSIS — M54.41 CHRONIC MIDLINE LOW BACK PAIN WITH BILATERAL SCIATICA: Primary | ICD-10-CM

## 2019-07-25 PROCEDURE — 99214 OFFICE O/P EST MOD 30 MIN: CPT | Mod: PBBFAC,PN | Performed by: NURSE PRACTITIONER

## 2019-07-25 PROCEDURE — 99214 PR OFFICE/OUTPT VISIT, EST, LEVL IV, 30-39 MIN: ICD-10-PCS | Mod: S$PBB,,, | Performed by: NURSE PRACTITIONER

## 2019-07-25 PROCEDURE — 99999 PR PBB SHADOW E&M-EST. PATIENT-LVL IV: CPT | Mod: PBBFAC,,, | Performed by: NURSE PRACTITIONER

## 2019-07-25 PROCEDURE — 99999 PR PBB SHADOW E&M-EST. PATIENT-LVL IV: ICD-10-PCS | Mod: PBBFAC,,, | Performed by: NURSE PRACTITIONER

## 2019-07-25 PROCEDURE — 99214 OFFICE O/P EST MOD 30 MIN: CPT | Mod: S$PBB,,, | Performed by: NURSE PRACTITIONER

## 2019-07-25 RX ORDER — TIZANIDINE 4 MG/1
4 TABLET ORAL EVERY 8 HOURS PRN
Qty: 30 TABLET | Refills: 1 | Status: SHIPPED | OUTPATIENT
Start: 2019-07-25 | End: 2019-08-21 | Stop reason: SDUPTHER

## 2019-07-25 NOTE — PROGRESS NOTES
Routine Office Visit    Patient Name: Luciano Henriquez Jr.    : 1950  MRN: [id number]    Chief Complaint:  Back pain    Subjective:  Luciano is a 69 y.o. male who presents today for:    1.  Back pain - patient reports today for evaluation of lower back pain. He states that he has been having lower back pain that has been coming and going for the last 2 years.  This current episode of back pain started 1 month ago and has worsened in severity from his usual episodes.  Endorses aching lower back pain that radiates down to the front and back of his thighs.  The lower back pain is worse when getting up in the morning and worse with bending over as well.  No specific movements relieve the pain.  Denies any changes to bowel or bladder.  Denies any fevers, chills, or recent weight loss.  Denies any inciting event or injury for this pain. Treatments tried for the back pain include NSAIDs and baclofen, the latter of which has not helped him too much in the last week or so.  He has had physical therapy before for knee pain but not for back pain. He would like to discuss possibly getting an MRI for this pain.    Past Medical History  Past Medical History:   Diagnosis Date    Allergy     Hyperlipidemia     Hypertension     Reflux esophagitis        Past Surgical History  Past Surgical History:   Procedure Laterality Date    arthroscopic on left knee      CARPAL TUNNEL RELEASE      right hand only    deviated septum repair      HERNIA REPAIR Left     inguinal    REPAIR-HERNIA-LAPAROSCOPIC-INGUINAL Left 2016    Performed by [facility]app [facility]    VASECTOMY         Family History  Family History   Problem Relation Age of Onset    Arthritis Mother     Hyperlipidemia Mother     Hypertension Mother     Cancer Father         prostate    Stroke Father        Social History  Social History     Socioeconomic History    Marital status:      Spouse name: Not on file    Number of children: Not on  file    Years of education: Not on file    Highest education level: Not on file   Occupational History    Not on file   Social Needs    Financial resource strain: Not on file    Food insecurity:     Worry: Not on file     Inability: Not on file    Transportation needs:     Medical: Not on file     Non-medical: Not on file   Tobacco Use    Smoking status: Never Smoker    Smokeless tobacco: Never Used    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     Substance and Sexual Activity    Alcohol use: Yes     Alcohol/week: 8.4 oz     Types: 14 Glasses of wine per week     Comment: Drinks 2 glasses of wine per night    Drug use: No    Sexual activity: Yes     Partners: Female   Lifestyle    Physical activity:     Days per week: Not on file     Minutes per session: Not on file    Stress: Not on file   Relationships    Social connections:     Talks on phone: Not on file     Gets together: Not on file     Attends Quaker service: Not on file     Active member of club or organization: Not on file     Attends meetings of clubs or organizations: Not on file     Relationship status: Not on file   Other Topics Concern    Not on file   Social History Narrative    Not on file       Current Medications  Current Outpatient Medications on File Prior to Visit   Medication Sig Dispense Refill    amitriptyline (ELAVIL) 100 MG tablet       amLODIPine (NORVASC) 10 MG tablet Take 1 tablet (10 mg total) by mouth once daily. 90 tablet 3    ascorbic acid (VITAMIN C) 500 MG tablet Take 500 mg by mouth 2 (two) times daily.      aspirin (ECOTRIN) 81 MG EC tablet Take 81 mg by mouth once daily.      azelastine (ASTELIN) 137 mcg (0.1 %) nasal spray 1 spray (137 mcg total) by Nasal route 2 (two) times daily. 30 mL 1    BOOSTRIX TDAP 2.5-8-5 Lf-mcg-Lf/0.5mL Syrg injection       chlorthalidone (HYGROTEN) 25 MG Tab Take 1 tablet (25 mg total) by mouth once daily. 90 tablet 3    doxepin (ZONALON) 5 %  "cream       fish oil-omega-3 fatty acids 300-1,000 mg capsule Take 2 g by mouth once daily.      multivitamin capsule Take 1 capsule by mouth once daily.      olmesartan (BENICAR) 40 MG tablet Take 1 tablet (40 mg total) by mouth once daily. 90 tablet 3    rosuvastatin (CRESTOR) 10 MG tablet Take 1 tablet (10 mg total) by mouth every evening. 90 tablet 3    sucralfate (CARAFATE) 1 gram tablet Take 1 tablet (1 g total) by mouth 4 (four) times daily before meals and nightly. 360 tablet 2    [DISCONTINUED] baclofen (LIORESAL) 10 MG tablet Take 1 tablet (10 mg total) by mouth 2 (two) times daily. 60 tablet 0    albuterol (VENTOLIN HFA) 90 mcg/actuation inhaler Inhale 2 puffs into the lungs every 6 (six) hours as needed for Wheezing or Shortness of Breath. Rescue 18 g 0     No current facility-administered medications on file prior to visit.        Allergies   Review of patient's allergies indicates:  No Known Allergies    Review of Systems (Pertinent positives)  Review of Systems   Constitutional: Negative for chills, diaphoresis, fever, malaise/fatigue and weight loss.   HENT: Negative.    Eyes: Negative.    Respiratory: Negative.    Cardiovascular: Negative.    Gastrointestinal: Negative for abdominal pain, blood in stool, constipation, diarrhea, heartburn, melena, nausea and vomiting.   Genitourinary: Negative for dysuria, flank pain, frequency, hematuria and urgency.   Musculoskeletal: Positive for back pain and joint pain. Negative for falls, myalgias and neck pain.   Skin: Negative.    Neurological: Negative.  Negative for dizziness, tingling, tremors, sensory change, speech change, focal weakness, seizures, loss of consciousness, weakness and headaches.   Endo/Heme/Allergies: Negative.    Psychiatric/Behavioral: Negative.        /74 (BP Location: Left arm, Patient Position: Sitting, BP Method: Small (Manual))   Pulse 67   Temp 98.5 °F (36.9 °C) (Oral)   Resp 16   Ht 5' 5" (1.651 m)   Wt 91.2 kg " (201 lb 1 oz)   SpO2 95%   BMI 33.46 kg/m²     Physical Exam   Constitutional: He is oriented to person, place, and time. He appears well-developed and well-nourished. No distress.   Eyes: Pupils are equal, round, and reactive to light. Conjunctivae and EOM are normal.   Neck: Normal range of motion. Neck supple.   Cardiovascular: Normal rate, regular rhythm, normal heart sounds and intact distal pulses. Exam reveals no gallop and no friction rub.   No murmur heard.  Pulmonary/Chest: Effort normal and breath sounds normal. No stridor. No respiratory distress. He has no wheezes. He has no rales. He exhibits no tenderness.   Abdominal: Soft. Bowel sounds are normal. He exhibits no distension and no mass. There is no tenderness. There is no guarding.   Musculoskeletal:        Cervical back: Normal.        Thoracic back: Normal.        Lumbar back: He exhibits decreased range of motion, tenderness and pain. He exhibits no bony tenderness, no swelling, no edema, no deformity, no laceration, no spasm and normal pulse.        Back:    Straight leg raise test negative bilaterally   Neurological: He is alert and oriented to person, place, and time.   Skin: Skin is warm and dry. Capillary refill takes less than 2 seconds. He is not diaphoretic.        Assessment/Plan:  Luciano Henriquez Jr. is a 69 y.o. male who presents today for :    Luciano was seen today for back pain and leg pain.    Diagnoses and all orders for this visit:    Chronic midline low back pain with bilateral sciatica  -     MRI Lumbar Spine Without Contrast; Future  -     Ambulatory Referral to Physical/Occupational Therapy  -     tiZANidine (ZANAFLEX) 4 MG tablet; Take 1 tablet (4 mg total) by mouth every 8 (eight) hours as needed.     Switched baclofen to tizanidine to attempt more frequent dosing. Patient was educated regarding the side effects of muscle relaxers, including drowsiness, dizziness, and sleepiness.  The patient was educated to not drive or  operate heavy machinery until the patient knows how this medication affects him/her.  Patient was instructed not to take the baclofen and tizanidine together and he was instructed to take 1 or the other.  Since he has never had physical therapy for his back pain I will write this right now.  I told the patient that I will write an MRI for him, but I cannot guarantee that his insurance will cover the MRI given that he has never had x-rays of his lower back or PT to treat his lower back pain.  Ice or heat may help with the symptoms as well.  Alarm symptoms for back pain were discussed with the patient, including fevers, chills, changes to bowel or bladder, bleeding, or weight loss.  Patient verbalized understanding of instructions.        This office note has been dictated.  This dictation has been generated using M-Modal Fluency Direct dictation; some phonetic errors may occur.   My collaborating physician is Dr. Jaxon Mckeon.

## 2019-07-26 ENCOUNTER — CLINICAL SUPPORT (OUTPATIENT)
Dept: REHABILITATION | Facility: HOSPITAL | Age: 69
End: 2019-07-26
Attending: NURSE PRACTITIONER
Payer: MEDICARE

## 2019-07-26 DIAGNOSIS — R53.1 DECREASED STRENGTH, ENDURANCE, AND MOBILITY: ICD-10-CM

## 2019-07-26 DIAGNOSIS — M54.41 ACUTE BILATERAL LOW BACK PAIN WITH BILATERAL SCIATICA: Primary | ICD-10-CM

## 2019-07-26 DIAGNOSIS — M54.42 ACUTE BILATERAL LOW BACK PAIN WITH BILATERAL SCIATICA: Primary | ICD-10-CM

## 2019-07-26 DIAGNOSIS — M25.60 DECREASED RANGE OF MOTION: ICD-10-CM

## 2019-07-26 DIAGNOSIS — Z74.09 DECREASED STRENGTH, ENDURANCE, AND MOBILITY: ICD-10-CM

## 2019-07-26 DIAGNOSIS — R68.89 DECREASED STRENGTH, ENDURANCE, AND MOBILITY: ICD-10-CM

## 2019-07-26 PROCEDURE — G8979 MOBILITY GOAL STATUS: HCPCS | Mod: CJ,PN

## 2019-07-26 PROCEDURE — 97161 PT EVAL LOW COMPLEX 20 MIN: CPT | Mod: PN

## 2019-07-26 PROCEDURE — G8978 MOBILITY CURRENT STATUS: HCPCS | Mod: CK,PN

## 2019-07-26 PROCEDURE — 97110 THERAPEUTIC EXERCISES: CPT | Mod: PN

## 2019-07-26 NOTE — PLAN OF CARE
[facility]  Physical Therapy Initial Evaluation    Name: Luciano Henriquez Jr.  Clinic Number: [id number]    Therapy Diagnosis:   Encounter Diagnoses   Name Primary?    Acute bilateral low back pain with bilateral sciatica Yes    Decreased range of motion     Decreased strength, endurance, and mobility        Physician: Juan Arteaga, SAMMIE    Physician Orders: PT Eval and Treat   Medical Diagnosis from Referral: M54.41,M54.42,G89.29 (ICD-10-CM) - Chronic midline low back pain with bilateral sciatica  Evaluation Date: 7/26/2019  Authorization Period Expiration: 7/24/2020  Plan of Care Expiration: 10/4/19  Visit # / Visits authorized: 1/ 1    Time In: 1:00pm  Time Out: 2:00pm  Total Billable Time: 60 minutes    Precautions: Standard    Subjective   Date of onset: acute onset over last month, LBP over last few years  History of current condition - Luciano reports: Low anil pain over last few years, more acute symptoms began last month. Significant pain in midline of low back with radiating symptoms to anterior/posterior BLE to knee. Pain is most prominent in the morning and lasts for about 3 hours until back loosens up. Pt also reports B knee arthritis contributing to difficulty standing from low chair and ambulating prolonged distances. He recently had a change muscle relaxer, states he has noticed an improvement in symptoms.      Medical History:   Past Medical History:   Diagnosis Date    Allergy     Hyperlipidemia     Hypertension     Reflux esophagitis        Surgical History:   Luciano Henriquez Jr.  has a past surgical history that includes Vasectomy; Carpal tunnel release; arthroscopic on left knee; deviated septum repair; and Hernia repair (Left).    Medications:   Luciano has a current medication list which includes the following prescription(s): albuterol, amitriptyline, amlodipine, ascorbic acid (vitamin c), aspirin, azelastine, boostrix tdap, chlorthalidone, doxepin, fish  oil-omega-3 fatty acids, multivitamin, olmesartan, rosuvastatin, sucralfate, and tizanidine.    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  Occupation: retired from full time position, on call to [employer] paperwork (drives for work often)  Prior Level of Function: independent  Current Level of Function: Pain with ambulating for prolonged distances, pain with driving low cars, sit<>stand from low surfaces, bending over in the morning when takes up    Pain:  Current 3/10, worst 6/10, best 2/10   Location: bilateral back , buttocks  and knee   Description: Tight  Aggravating Factors: Standing, Bending, Walking, Morning, Lifting and Getting out of bed/chair  Easing Factors: pain medication, hot bath and rest    Pts goals: return to PLOF, ability to ambulate prolonged distances, decrease pain    Objective       Posture Alignment: slouched posture;forward head;    Sensation: intact to light touch    GAIT DEVIATIONS: Luciano displays decreased trunk rotation, BLE external rotation, B pronation,     Lumbar Range of Motion:    %   Flexion WFL     Extension mod     Left Side Bending mod   Right Side Bending mod   Left rotation   mod   Right Rotation   mod    *= pain      Lower Extremity Strength    Right LE  Left LE    Hip flexion: 4-/5 Hip flexion: 4-/5   Knee extension: 4/5 Knee extension: 4/5   Knee flexion: 4/5 Knee flexion: 4/5   Hip IR: 4/5 Hip IR: 4/5   Hip ER: 4/5 Hip ER: 4-/5   Hip extension:  4-/5 Hip extension: 4-/5   Hip abduction: 4-/5 Hip abduction: 4-/5   Hip adduction: 3+/5 Hip adduction 3+/5   Ankle dorsiflexion: 5/5 Ankle dorsiflexion: 5/5   Ankle plantarflexion: 4+/5 Ankle plantarflexion: 4+/5     Special Tests:  -Quadrant testing: negative  -JEROME: positive  -Scour: negative  -Repeated Flexion: feels pull  "in HS,  -Repeated Ext:positive, increase symptoms to R side  -Piriformis Test: positive  -Prone Instability Test: positive  -Bridge Test: positive  -Sustained extension: positive  -Heel walking: negative  -Toe walking: negative  -Long sitting test:positive      SI provocation test:  Distraction test: negative  Compression test: negative  Thigh thrust test: positive  Gaenslens Right side tests: positive  Gaenslens Left side test: positive  Sacral thrust test: negative  3 out of all 6 provocation have sensitivity of .94 and specificity .78 for SIJ pathology       Neuro Dynamic Testing:    Sciatic nerve:      SLR: positive    Tibial bias: negative    Common Peroneal bias: negative   Femoral Nerve:    Femoral nerve test: negative   Neural Tension:     Slump test: positive    Joint Mobility: WNL       Palpation: TTP in QL B around SI    Flexibility:    Popliteal Angle: R = 160 degrees ; L = 157 degrees       PT Evaluation Completed? Yes  Discussed Plan of Care with patient: Yes        CMS Impairment/Limitation/Restriction for FOTO Lumbar Survey    Therapist reviewed FOTO scores for Luciano Henriquez Jr. on 7/26/2019.   FOTO documents entered into [employer] - see Media section.    Limitation Score: 53%  Category: Mobility    Current : CK = at least 40% but < 60% impaired, limited or restricted  Goal: CJ = at least 20% but < 40% impaired, limited or restricted         TREATMENT   Treatment Time In: 1:50pm  Treatment Time Out: 2:00pm  Total Treatment time separate from Evaluation: 10 minutes    Luciano received therapeutic exercises to develop strength, endurance, ROM, flexibility, posture and core stabilization for 10 minutes including:    SKTC 5 x 5" B  DKTC 5 x 5", use PB at initial visit  LTR x 15 B  TrA 10 x 5" hold    Home Exercises and Patient Education Provided    Education provided:   - Given HEP  -activity mod    Written Home Exercises Provided: yes.  Exercises were reviewed and Luciano was able to demonstrate them prior " to the end of the session.  Luciano demonstrated good  understanding of the education provided.     See EMR under Patient Instructions for exercises provided 7/26/2019.    Assessment   Luciano is a 69 y.o. male referred to outpatient Physical Therapy with a medical diagnosis of chronic midline low back pain with B sciatica. Pt presents with radiating symptoms into B knee (R>L) on anterior and posterior portion of thigh, decreased range of motion, decreased strength/endurance/mobility, difficulty performing select ADL's and household duties without increased pain, and increased pain when ambulating prolonged distances. Plan to treat pt 2x/week for 10 weeks. Pt given HEP and agreed to plan of care.     Pt prognosis is Good.   Pt will benefit from skilled outpatient Physical Therapy to address the deficits stated above and in the chart below, provide pt/family education, and to maximize pt's level of independence.     Plan of care discussed with patient: Yes  Pt's spiritual, cultural and educational needs considered and patient is agreeable to the plan of care and goals as stated below:     Anticipated Barriers for therapy: chronic pain    Medical Necessity is demonstrated by the following  History  Co-morbidities and personal factors that may impact the plan of care Co-morbidities:   See EMR    Personal Factors:   no deficits     low   Examination  Body Structures and Functions, activity limitations and participation restrictions that may impact the plan of care Body Regions:   back  lower extremities  trunk    Body Systems:    ROM  strength  balance  gait  transitions    Participation Restrictions:   Ambulating prolonged distances in community, unable to perform light exercise at home without increased pain    Activity limitations:   Learning and applying knowledge  no deficits    General Tasks and Commands  no deficits    Communication  no deficits    Mobility  lifting and carrying objects  walking  driving (bike, car,  motorcycle)    Self care  washing oneself (bathing, drying, washing hands)  looking after one's health    Domestic Life  doing house work (cleaning house, washing dishes, laundry)    Interactions/Relationships  no deficits    Life Areas  no deficits    Community and Social Life  community life  recreation and leisure         Complexity: low  See FOTO outcome assessment   Clinical Presentation stable and uncomplicated low   Decision Making/ Complexity Score: low     GOALS: Short Term Goals:  5 weeks  1. Report decreased in pain at worse less than  <   / =  4  /10  to increase tolerance for functional activities  2. Pt to increase B popliteal angle by greater than > or = 5 degrees in order to improve flexibility and posture.   3. Increased BLE MMT 1/2  to increase tolerance for ADL and work activities.  4. Pt to tolerate HEP to improve ROM and independence with ADL's  5. Pt to improve range of motion by 25% to allow for improved functional mobility to allow for improvement in IADLs.     Long Term Goals: 10 weeks  1. Report decreased in pain at worse less than  <   / =  2  /10  to increase tolerance for functional mobility.   2 .Pt to increase B popliteal angle by greater than > or = 10 degrees in order to improve flexibility and posture.   3. Increased BLE MMT 1 grade to increase tolerance for ADL and work activities.  4. Pt will report at CJ% or less limitation on FOTO Lumbar  to demonstrate decrease in disability and improvement in back pain.  5. Pt to be Independent with HEP to improve ROM and independence with ADL's  6. Pt to demonstrate negative Bridge Test in order to show improved core strength for lumbar stabilization.   7. Pt to improve range of motion by 75% to allow for improved functional mobility to allow for improvement in IADLs.   8. Pt will report ability to ambulate 20 minutes without increased lumbar pain.      Plan   Plan of care Certification: 7/26/2019 to 10/4/19.    Outpatient Physical Therapy  2 times weekly for 10 weeks to include the following interventions: Cervical/Lumbar Traction, Gait Training, Manual Therapy, Moist Heat/ Ice, Neuromuscular Re-ed, Patient Education, Self Care, Therapeutic Activites, Therapeutic Exercise and Ultrasound.     Stacie Sharma, PT, DPT

## 2019-07-30 ENCOUNTER — CLINICAL SUPPORT (OUTPATIENT)
Dept: REHABILITATION | Facility: HOSPITAL | Age: 69
End: 2019-07-30
Attending: NURSE PRACTITIONER
Payer: MEDICARE

## 2019-07-30 DIAGNOSIS — R53.1 DECREASED STRENGTH, ENDURANCE, AND MOBILITY: ICD-10-CM

## 2019-07-30 DIAGNOSIS — M54.41 ACUTE BILATERAL LOW BACK PAIN WITH BILATERAL SCIATICA: ICD-10-CM

## 2019-07-30 DIAGNOSIS — M25.60 DECREASED RANGE OF MOTION: ICD-10-CM

## 2019-07-30 DIAGNOSIS — M54.42 ACUTE BILATERAL LOW BACK PAIN WITH BILATERAL SCIATICA: ICD-10-CM

## 2019-07-30 DIAGNOSIS — R68.89 DECREASED STRENGTH, ENDURANCE, AND MOBILITY: ICD-10-CM

## 2019-07-30 DIAGNOSIS — Z74.09 DECREASED STRENGTH, ENDURANCE, AND MOBILITY: ICD-10-CM

## 2019-07-30 PROCEDURE — 97110 THERAPEUTIC EXERCISES: CPT | Mod: PN

## 2019-07-30 NOTE — PROGRESS NOTES
"  Physical Therapy Daily Treatment Note     Name: Luciano Henriquez Jr.  Clinic Number: [id number]    Therapy Diagnosis:   Encounter Diagnoses   Name Primary?    Acute bilateral low back pain with bilateral sciatica     Decreased range of motion     Decreased strength, endurance, and mobility      Physician: Juan Arteaga NP    Visit Date: 7/30/2019    Physician Orders: PT Eval and Treat  Medical Diagnosis from Referral: M54.41,M54.42,G89.29 (ICD-10-CM) - Chronic midline low back pain with bilateral sciatica  Evaluation Date: 7/26/2019  Authorization Period Expiration: 12/31/2019  Plan of Care Certification Period: 7/26/2019 to 10/4/2019 (PN due by 8/26/2019)  Visit #/Visits authorized: 2 / 20     Time In: 1100  Time Out: 1200  Total Treatment Time: 60 minutes  Total Billable Time: 30 minutes    Precautions: Standard    Subjective     Pt reports: that he feels more muscle tightness and stiff this morning. Patient states that he felt sore after the first session, but that his pain decreased and he felt better overall. Patient states that he is going to see Dr. Iglesias tomorrow for a follow up appointment.  He was compliant with home exercise program.  Response to previous treatment: good, appropriate muscle response  Functional change: none to note    Pain: 1-2/10  Location: bilateral back      Objective     Luciano received therapeutic exercises to develop strength, endurance, ROM, flexibility, posture and core stabilization for 60 minutes including:    +Nustep x 7 minutes    SKTC 10 x 10" hold ea. /c towel assist  DKTC on SB x 20  LTR on SB x 20  TrA 20 x 5" hold  +Supine HS stretch /c strap: 2 x 30" hold ea.  +Supine hip flexor stretch: 2 x 30" hold ea.  +HL Hip Add ball squeeze: 30x  +HL Hip abduction: Red TB x 30  +HL bridges: 2 x 10  +TrA /c march: 20x  +TrA /c BKFO: 15 x ea.  +TrA /c SLR: 15 x ea.    NV: SL clamshells, SL hip abduction    Luciano received the following manual therapy techniques: none    Home " Exercises Provided and Patient Education Provided     Education provided:   - continued compliance with HEP    Written Home Exercises Provided: Patient instructed to cont prior HEP.  Exercises were reviewed and Luciano was able to demonstrate them prior to the end of the session.  Luciano demonstrated good  understanding of the education provided.     See EMR under Patient Instructions for exercises provided prior visit.    Assessment     Patient tolerated treatment session well today with no adverse reaction. Patient with good return demonstration of HEP. Good tolerance to exercises performed noting appropriate muscle response. Patient with visible decrease in hamstring and hip flexor tissue extensibility. Patient required verbal cueing with TrA exercises for proper breathing technique to avoid valsalva maneuver.   Luciano is progressing well towards his goals.   Pt prognosis is Good.     Pt will continue to benefit from skilled outpatient physical therapy to address the deficits listed in the problem list box on initial evaluation, provide pt/family education and to maximize pt's level of independence in the home and community environment.     Pt's spiritual, cultural and educational needs considered and pt agreeable to plan of care and goals.    Anticipated barriers to physical therapy: chronic pain    Goals: Short Term Goals:  5 weeks  1. Report decreased in pain at worse less than  <   / =  4  /10  to increase tolerance for functional activities  2. Pt to increase B popliteal angle by greater than > or = 5 degrees in order to improve flexibility and posture.   3. Increased BLE MMT 1/2  to increase tolerance for ADL and work activities.  4. Pt to tolerate HEP to improve ROM and independence with ADL's  5. Pt to improve range of motion by 25% to allow for improved functional mobility to allow for improvement in IADLs.     Plan     Assess tolerance to treatment session and progress per POC.     Lissette Lundberg, PTA

## 2019-07-31 ENCOUNTER — OFFICE VISIT (OUTPATIENT)
Dept: FAMILY MEDICINE | Facility: CLINIC | Age: 69
End: 2019-07-31
Payer: MEDICARE

## 2019-07-31 VITALS
HEART RATE: 74 BPM | SYSTOLIC BLOOD PRESSURE: 105 MMHG | RESPIRATION RATE: 17 BRPM | HEIGHT: 65 IN | OXYGEN SATURATION: 95 % | BODY MASS INDEX: 33.46 KG/M2 | TEMPERATURE: 98 F | DIASTOLIC BLOOD PRESSURE: 71 MMHG | WEIGHT: 200.81 LBS

## 2019-07-31 DIAGNOSIS — M54.50 CHRONIC LEFT-SIDED LOW BACK PAIN WITHOUT SCIATICA: Primary | ICD-10-CM

## 2019-07-31 DIAGNOSIS — G89.29 CHRONIC LEFT-SIDED LOW BACK PAIN WITHOUT SCIATICA: Primary | ICD-10-CM

## 2019-07-31 PROCEDURE — 99214 OFFICE O/P EST MOD 30 MIN: CPT | Mod: S$PBB,,, | Performed by: FAMILY MEDICINE

## 2019-07-31 PROCEDURE — 99999 PR PBB SHADOW E&M-EST. PATIENT-LVL III: CPT | Mod: PBBFAC,,, | Performed by: FAMILY MEDICINE

## 2019-07-31 PROCEDURE — 99999 PR PBB SHADOW E&M-EST. PATIENT-LVL III: ICD-10-PCS | Mod: PBBFAC,,, | Performed by: FAMILY MEDICINE

## 2019-07-31 PROCEDURE — 99214 PR OFFICE/OUTPT VISIT, EST, LEVL IV, 30-39 MIN: ICD-10-PCS | Mod: S$PBB,,, | Performed by: FAMILY MEDICINE

## 2019-07-31 PROCEDURE — 99213 OFFICE O/P EST LOW 20 MIN: CPT | Mod: PBBFAC,25,PN | Performed by: FAMILY MEDICINE

## 2019-07-31 NOTE — PROGRESS NOTES
Routine Office Visit    Patient Name: Luciano Henriquez Jr.    : 1950  MRN: [id number]    Subjective:  Luciano is a 69 y.o. male who presents today for:    1. Left back pain  Patient presenting today for continued left lower back pain.  Was seen by NP last week and was changed to zanaflex from baclofen.  The pain has persisted.  He is doing PT now and has been trying OTC topical patches which have helped.  There is no weakness or incontinence.  No paresthesias    Past Medical History  Past Medical History:   Diagnosis Date    Allergy     Hyperlipidemia     Hypertension     Reflux esophagitis        Past Surgical History  Past Surgical History:   Procedure Laterality Date    arthroscopic on left knee      CARPAL TUNNEL RELEASE      right hand only    deviated septum repair      HERNIA REPAIR Left     inguinal    REPAIR-HERNIA-LAPAROSCOPIC-INGUINAL Left 2016    Performed by [facility]    VASECTOMY         Family History  Family History   Problem Relation Age of Onset    Arthritis Mother     Hyperlipidemia Mother     Hypertension Mother     Cancer Father         prostate    Stroke Father        Social History  Social History     Socioeconomic History    Marital status:      Spouse name: Not on file    Number of children: Not on file    Years of education: Not on file    Highest education level: Not on file   Occupational History    Not on file   Social Needs    Financial resource strain: Not on file    Food insecurity:     Worry: Not on file     Inability: Not on file    Transportation needs:     Medical: Not on file     Non-medical: Not on file   Tobacco Use    Smoking status: Never Smoker    Smokeless tobacco: Never Used    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     Substance and Sexual Activity    Alcohol use: Yes     Alcohol/week: 8.4 oz     Types: 14 Glasses of wine per week     Comment: Drinks 2 glasses of wine per night     Drug use: No    Sexual activity: Yes     Partners: Female   Lifestyle    Physical activity:     Days per week: Not on file     Minutes per session: Not on file    Stress: Not on file   Relationships    Social connections:     Talks on phone: Not on file     Gets together: Not on file     Attends Jehov[employer]'s witness service: Not on file     Active member of club or organization: Not on file     Attends meetings of clubs or organizations: Not on file     Relationship status: Not on file   Other Topics Concern    Not on file   Social History Narrative    Not on file       Current Medications  Current Outpatient Medications on File Prior to Visit   Medication Sig Dispense Refill    albuterol (VENTOLIN HFA) 90 mcg/actuation inhaler Inhale 2 puffs into the lungs every 6 (six) hours as needed for Wheezing or Shortness of Breath. Rescue 18 g 0    amitriptyline (ELAVIL) 100 MG tablet       amLODIPine (NORVASC) 10 MG tablet Take 1 tablet (10 mg total) by mouth once daily. 90 tablet 3    ascorbic acid (VITAMIN C) 500 MG tablet Take 500 mg by mouth 2 (two) times daily.      aspirin (ECOTRIN) 81 MG EC tablet Take 81 mg by mouth once daily.      azelastine (ASTELIN) 137 mcg (0.1 %) nasal spray 1 spray (137 mcg total) by Nasal route 2 (two) times daily. 30 mL 1    chlorthalidone (HYGROTEN) 25 MG Tab Take 1 tablet (25 mg total) by mouth once daily. 90 tablet 3    doxepin (ZONALON) 5 % cream       fish oil-omega-3 fatty acids 300-1,000 mg capsule Take 2 g by mouth once daily.      multivitamin capsule Take 1 capsule by mouth once daily.      olmesartan (BENICAR) 40 MG tablet Take 1 tablet (40 mg total) by mouth once daily. 90 tablet 3    rosuvastatin (CRESTOR) 10 MG tablet Take 1 tablet (10 mg total) by mouth every evening. 90 tablet 3    sucralfate (CARAFATE) 1 gram tablet Take 1 tablet (1 g total) by mouth 4 (four) times daily before meals and nightly. 360 tablet 2    BOOSTRIX TDAP 2.5-8-5 Lf-mcg-Lf/0.5mL Syrg injection   "      No current facility-administered medications on file prior to visit.        Allergies   Review of patient's allergies indicates:  No Known Allergies    Review of Systems (Pertinent positives)  Review of Systems   Constitutional: Negative.    HENT: Negative.    Respiratory: Negative.    Genitourinary: Negative.    Musculoskeletal: Positive for back pain and myalgias.   Skin: Negative.    Neurological: Negative.          /71 (BP Location: Left arm, Patient Position: Sitting, BP Method: Medium (Automatic))   Pulse 74   Temp 98.2 °F (36.8 °C) (Oral)   Resp 17   Ht 5' 5" (1.651 m)   Wt 91.1 kg (200 lb 13.4 oz)   SpO2 95%   BMI 33.42 kg/m²     GENERAL APPEARANCE: in no apparent distress and well developed and well nourished  HEENT: PERRL, EOMI, Sclera clear, anicteric, Oropharynx clear, no lesions, Neck supple with midline trachea  NECK: normal, supple, no adenopathy, thyroid normal in size  RESPIRATORY: appears well, vitals normal, no respiratory distress, acyanotic, normal RR, chest clear, no wheezing, crepitations, rhonchi, normal symmetric air entry  HEART: regular rate and rhythm, S1, S2 normal, no murmur, click, rub or gallop.    ABDOMEN: abdomen is soft without tenderness, no masses, no hernias, no organomegaly, no rebound, no guarding. Suprapubic tenderness absent. No CVA tenderness.  MS:  No pain on palpation of spinous processes; mild pain on palpation of left lower back  SKIN: no rashes, no wounds, no other lesions  PSYCH: Alert, oriented x 3, thought content appropriate, speech normal, pleasant and cooperative, good eye contact, well groomed,    Assessment/Plan:  Luciano Henriquez . is a 69 y.o. male who presents today for :    Luciano was seen today for low-back pain.    Diagnoses and all orders for this visit:    Chronic left-sided low back pain without sciatica  -     X-Ray Lumbar Spine AP And Lateral; Future      1.  Due to duration of pain will get xrays done today  2.  He is to " continue with PT  3.  Follow up as needed  4.  Call with any concerns  5.  Will consider if pain persists after PT      Marcelino Iglesias MD

## 2019-08-02 ENCOUNTER — CLINICAL SUPPORT (OUTPATIENT)
Dept: REHABILITATION | Facility: HOSPITAL | Age: 69
End: 2019-08-02
Attending: NURSE PRACTITIONER
Payer: MEDICARE

## 2019-08-02 DIAGNOSIS — R53.1 DECREASED STRENGTH, ENDURANCE, AND MOBILITY: ICD-10-CM

## 2019-08-02 DIAGNOSIS — M54.41 ACUTE BILATERAL LOW BACK PAIN WITH BILATERAL SCIATICA: ICD-10-CM

## 2019-08-02 DIAGNOSIS — M54.42 ACUTE BILATERAL LOW BACK PAIN WITH BILATERAL SCIATICA: ICD-10-CM

## 2019-08-02 DIAGNOSIS — R68.89 DECREASED STRENGTH, ENDURANCE, AND MOBILITY: ICD-10-CM

## 2019-08-02 DIAGNOSIS — M25.60 DECREASED RANGE OF MOTION: ICD-10-CM

## 2019-08-02 DIAGNOSIS — Z74.09 DECREASED STRENGTH, ENDURANCE, AND MOBILITY: ICD-10-CM

## 2019-08-02 PROCEDURE — 97110 THERAPEUTIC EXERCISES: CPT | Mod: PN

## 2019-08-02 NOTE — PROGRESS NOTES
"  Physical Therapy Daily Treatment Note     Name: Luciano Henriquez Jr.  Clinic Number: [id number]    Therapy Diagnosis:   Encounter Diagnoses   Name Primary?    Acute bilateral low back pain with bilateral sciatica     Decreased range of motion     Decreased strength, endurance, and mobility      Physician: Juan Arteaga NP    Visit Date: 8/2/2019    Physician Orders: PT Eval and Treat  Medical Diagnosis from Referral: M54.41,M54.42,G89.29 (ICD-10-CM) - Chronic midline low back pain with bilateral sciatica  Evaluation Date: 7/26/2019  Authorization Period Expiration: 12/31/2019  Plan of Care Certification Period: 7/26/2019 to 10/4/2019 (PN due by 8/26/2019)  Visit #/Visits authorized: 3 / 20     Time In: 3:00pm  Time Out: 4:00pm  Total Treatment Time: 60 minutes  Total Billable Time: 60 minutes    Precautions: Standard    Subjective     Pt reports: that he is having  He was compliant with home exercise program.  Response to previous treatment: good, appropriate muscle response  Functional change: none to note    Pain: 2-3/10  Location: bilateral back      Objective     Luciano received therapeutic exercises to develop strength, endurance, ROM, flexibility, posture and core stabilization for 60 minutes including:    +Nustep x 7 minutes    SKTC 5 x 5" hold ea. /c towel assist  DKTC on SB x 20  LTR on SB x 20  TrA 12 x 5" hold   Supine HS stretch /c strap: 2 x 30" hold ea.  Supine hip flexor stretch: 2 x 30" hold ea.  HL Hip Add ball squeeze: 15x3"  HL Hip abduction: Red TB x 30  HL bridges: 15  TrA /c march: 20x  TrA /c BKFO: 15 x ea.  TrA /c SLR: 15 x ea.  +SL Clamshells  +SL Hip abd       Luciano received the following manual therapy techniques: none    Home Exercises Provided and Patient Education Provided     Education provided:   - continued compliance with HEP    Written Home Exercises Provided: Patient instructed to cont prior HEP.  Exercises were reviewed and Luciano was able to demonstrate them prior to the " end of the session.  Luciano demonstrated good  understanding of the education provided.     See EMR under Patient Instructions for exercises provided prior visit.    Assessment     Patient tolerated treatment session well today with no adverse reaction. Patient with good return demonstration of HEP. Good tolerance to exercises performed noting appropriate muscle response. Patient with visible decrease in hamstring and hip flexor tissue extensibility. Continues to require verbal cueing with TrA exercises for proper breathing technique to avoid valsalva maneuver. Pt states he spoke with MD about healthy back program offered. I showed him equipment and spoke about the program offered at this facility. Next session initiate back extension machine and incorporate UE matrix machines with low weight to evaluate if pt would benefit from program.  Luciano is progressing well towards his goals.   Pt prognosis is Good.     Pt will continue to benefit from skilled outpatient physical therapy to address the deficits listed in the problem list box on initial evaluation, provide pt/family education and to maximize pt's level of independence in the home and community environment.     Pt's spiritual, cultural and educational needs considered and pt agreeable to plan of care and goals.    Anticipated barriers to physical therapy: chronic pain    Goals: Short Term Goals:  5 weeks  1. Report decreased in pain at worse less than  <   / =  4  /10  to increase tolerance for functional activities  2. Pt to increase B popliteal angle by greater than > or = 5 degrees in order to improve flexibility and posture.   3. Increased BLE MMT 1/2  to increase tolerance for ADL and work activities.  4. Pt to tolerate HEP to improve ROM and independence with ADL's  5. Pt to improve range of motion by 25% to allow for improved functional mobility to allow for improvement in IADLs.     Plan     Assess tolerance to treatment session and progress per POC.      Stacie Sharma, PT, DPT

## 2019-08-05 ENCOUNTER — CLINICAL SUPPORT (OUTPATIENT)
Dept: REHABILITATION | Facility: HOSPITAL | Age: 69
End: 2019-08-05
Attending: NURSE PRACTITIONER
Payer: MEDICARE

## 2019-08-05 DIAGNOSIS — R68.89 DECREASED STRENGTH, ENDURANCE, AND MOBILITY: ICD-10-CM

## 2019-08-05 DIAGNOSIS — R53.1 DECREASED STRENGTH, ENDURANCE, AND MOBILITY: ICD-10-CM

## 2019-08-05 DIAGNOSIS — M25.60 DECREASED RANGE OF MOTION: ICD-10-CM

## 2019-08-05 DIAGNOSIS — M54.41 ACUTE BILATERAL LOW BACK PAIN WITH BILATERAL SCIATICA: ICD-10-CM

## 2019-08-05 DIAGNOSIS — M54.42 ACUTE BILATERAL LOW BACK PAIN WITH BILATERAL SCIATICA: ICD-10-CM

## 2019-08-05 DIAGNOSIS — Z74.09 DECREASED STRENGTH, ENDURANCE, AND MOBILITY: ICD-10-CM

## 2019-08-05 PROCEDURE — 97110 THERAPEUTIC EXERCISES: CPT | Mod: PN

## 2019-08-05 NOTE — PROGRESS NOTES
"  Physical Therapy Daily Treatment Note     Name: Luciano Henriquez Jr.  Clinic Number: [id number]    Therapy Diagnosis:   Encounter Diagnoses   Name Primary?    Acute bilateral low back pain with bilateral sciatica     Decreased range of motion     Decreased strength, endurance, and mobility      Physician: Juan Arteaga NP    Visit Date: 8/5/2019    Physician Orders: PT Eval and Treat  Medical Diagnosis from Referral: M54.41,M54.42,G89.29 (ICD-10-CM) - Chronic midline low back pain with bilateral sciatica  Evaluation Date: 7/26/2019  Authorization Period Expiration: 12/31/2019  Plan of Care Certification Period: 7/26/2019 to 10/4/2019 (PN due by 8/26/2019)  Visit #/Visits authorized: 4 / 20     Time In: 1405  Time Out: 1500  Total Treatment Time: 55 minutes  Total Billable Time: 55 minutes    Precautions: Standard    Subjective     Pt reports: that he is pain free right now. Patient states "I have on a pain patch, I took my muscle relaxer, and Advil."  He was compliant with home exercise program.  Response to previous treatment: good, appropriate muscle response  Functional change: none to note    Pain: 0/10  Location: bilateral back      Objective     Luciano received therapeutic exercises to develop strength, endurance, ROM, flexibility, posture and core stabilization for 55 minutes including:    Nustep x 8 minutes    SKTC 5 x 5" hold ea. /c towel assist  DKTC on SB x 20  LTR on SB x 20   Supine HS stretch /c strap: 2 x 30" hold ea.  Supine hip flexor stretch: 2 x 30" hold ea.    +Lumbar Extension machine: 40# x 15 (ROM 0-65)  +Torso Rotation: 20# x 15 ea direction  +Matrix Chest Press: 15# x 20  +Matrix Narrow Row: 25# x 20  +Matrix Knee Extension: 20# x 20  +Matrix Hamstring Curl: 25# x 20    Luciano received the following manual therapy techniques: none    Home Exercises Provided and Patient Education Provided     Education provided:   - continued compliance with HEP    Written Home Exercises Provided: " Patient instructed to cont prior HEP.  Exercises were reviewed and Luciano was able to demonstrate them prior to the end of the session.  Luciano demonstrated good  understanding of the education provided.     See EMR under Patient Instructions for exercises provided prior visit.    Assessment     Patient tolerated treatment session well today with no adverse reaction. Initiated a modified healthy back program this session to focus on lumbar extensor and global muscle strengthening. Patient with good tolerance to exercises noting appropriate muscle response.   Luciano is progressing well towards his goals.   Pt prognosis is Good.     Pt will continue to benefit from skilled outpatient physical therapy to address the deficits listed in the problem list box on initial evaluation, provide pt/family education and to maximize pt's level of independence in the home and community environment.     Pt's spiritual, cultural and educational needs considered and pt agreeable to plan of care and goals.    Anticipated barriers to physical therapy: chronic pain    Goals: Short Term Goals:  5 weeks  1. Report decreased in pain at worse less than  <   / =  4  /10  to increase tolerance for functional activities  2. Pt to increase B popliteal angle by greater than > or = 5 degrees in order to improve flexibility and posture.   3. Increased BLE MMT 1/2  to increase tolerance for ADL and work activities.  4. Pt to tolerate HEP to improve ROM and independence with ADL's  5. Pt to improve range of motion by 25% to allow for improved functional mobility to allow for improvement in IADLs.     Plan     Assess tolerance to treatment session and progress per POC.     Lissette Lundberg, PTA

## 2019-08-08 ENCOUNTER — CLINICAL SUPPORT (OUTPATIENT)
Dept: REHABILITATION | Facility: HOSPITAL | Age: 69
End: 2019-08-08
Attending: NURSE PRACTITIONER
Payer: MEDICARE

## 2019-08-08 DIAGNOSIS — M25.60 DECREASED RANGE OF MOTION: ICD-10-CM

## 2019-08-08 DIAGNOSIS — M54.41 ACUTE BILATERAL LOW BACK PAIN WITH BILATERAL SCIATICA: ICD-10-CM

## 2019-08-08 DIAGNOSIS — Z74.09 DECREASED STRENGTH, ENDURANCE, AND MOBILITY: ICD-10-CM

## 2019-08-08 DIAGNOSIS — M54.42 ACUTE BILATERAL LOW BACK PAIN WITH BILATERAL SCIATICA: ICD-10-CM

## 2019-08-08 DIAGNOSIS — R68.89 DECREASED STRENGTH, ENDURANCE, AND MOBILITY: ICD-10-CM

## 2019-08-08 DIAGNOSIS — R53.1 DECREASED STRENGTH, ENDURANCE, AND MOBILITY: ICD-10-CM

## 2019-08-08 PROCEDURE — 97110 THERAPEUTIC EXERCISES: CPT | Mod: PN

## 2019-08-08 NOTE — PROGRESS NOTES
"  Physical Therapy Daily Treatment Note     Name: Luciano Henriquez Jr.  Clinic Number: [id number]    Therapy Diagnosis:   Encounter Diagnoses   Name Primary?    Acute bilateral low back pain with bilateral sciatica     Decreased range of motion     Decreased strength, endurance, and mobility        Physician: Juan Arteaga NP    Visit Date: 8/8/2019    Physician Orders: PT Eval and Treat  Medical Diagnosis from Referral: M54.41,M54.42,G89.29 (ICD-10-CM) - Chronic midline low back pain with bilateral sciatica  Evaluation Date: 7/26/2019  Authorization Period Expiration: 12/31/2019  Plan of Care Certification Period: 7/26/2019 to 10/4/2019 (PN due by 8/26/2019)  Visit #/Visits authorized: 5 / 20     Time In: 10:00am  Time Out: 10:58am  Total Treatment Time: 58 minutes  Total Billable Time: 58 minutes    Precautions: Standard    Subjective     Pt reports: that he is stiff this morning and minimal low back pain. Patient states "I have on a pain patch, I took my muscle relaxer, and Advil."  He was compliant with home exercise program.  Response to previous treatment: good, appropriate muscle response  Functional change: none to note    Pain: 4/10  Location: bilateral back      Objective     Luciano received therapeutic exercises to develop strength, endurance, ROM, flexibility, posture and core stabilization for 58 minutes including:    Nustep x 8 minutes    SKTC 5 x 5" hold ea. /c towel assist  DKTC on SB x 20   LTR on SB x 20   Supine HS stretch /c strap: 2 x 30" hold ea.  Supine hip flexor stretch: 2 x 30" hold ea.    +Lumbar Extension machine: 40# x 15 (ROM 0-65)  +Torso Rotation: 20# x 15 ea direction  +Matrix Chest Press: 15# x 20- moderate 3 on RPE, increase weight at next session  +Matrix Narrow Row: 25# x 20- RPE 1-2  +Matrix Knee Extension: 20# x 20 RPE 4  +Matrix Hamstring Curl: 25# x 20 RPE 3  +Matrix Hip Abd: 25# x 20 RPE 2  +Matrix Hip Add 30# x 20, RPE 3    Luciano received the following manual therapy " techniques: none    Home Exercises Provided and Patient Education Provided     Education provided:   - continued compliance with HEP    Written Home Exercises Provided: Patient instructed to cont prior HEP.  Exercises were reviewed and Luciano was able to demonstrate them prior to the end of the session.  Luciano demonstrated good  understanding of the education provided.     See EMR under Patient Instructions for exercises provided prior visit.    Assessment     Patient tolerated treatment session well today with no adverse reaction. Continued a modified healthy back program this session to focus on lumbar extensor and global muscle strengthening, with addition of hip abd/add today. Reported decrease in pain/stiffness following treatment session. Patient with good tolerance to exercises noting appropriate muscle response.     Luciano is progressing well towards his goals.   Pt prognosis is Good.     Pt will continue to benefit from skilled outpatient physical therapy to address the deficits listed in the problem list box on initial evaluation, provide pt/family education and to maximize pt's level of independence in the home and community environment.     Pt's spiritual, cultural and educational needs considered and pt agreeable to plan of care and goals.    Anticipated barriers to physical therapy: chronic pain    Goals: Short Term Goals:  5 weeks  1. Report decreased in pain at worse less than  <   / =  4  /10  to increase tolerance for functional activities  2. Pt to increase B popliteal angle by greater than > or = 5 degrees in order to improve flexibility and posture.   3. Increased BLE MMT 1/2  to increase tolerance for ADL and work activities.  4. Pt to tolerate HEP to improve ROM and independence with ADL's  5. Pt to improve range of motion by 25% to allow for improved functional mobility to allow for improvement in IADLs.     Plan     Assess tolerance to treatment session and progress per POC.     Stacie Sharma,  PT, DPT

## 2019-08-14 ENCOUNTER — CLINICAL SUPPORT (OUTPATIENT)
Dept: REHABILITATION | Facility: HOSPITAL | Age: 69
End: 2019-08-14
Attending: NURSE PRACTITIONER
Payer: MEDICARE

## 2019-08-14 DIAGNOSIS — Z74.09 DECREASED STRENGTH, ENDURANCE, AND MOBILITY: ICD-10-CM

## 2019-08-14 DIAGNOSIS — R68.89 DECREASED STRENGTH, ENDURANCE, AND MOBILITY: ICD-10-CM

## 2019-08-14 DIAGNOSIS — M54.41 ACUTE BILATERAL LOW BACK PAIN WITH BILATERAL SCIATICA: ICD-10-CM

## 2019-08-14 DIAGNOSIS — M54.42 ACUTE BILATERAL LOW BACK PAIN WITH BILATERAL SCIATICA: ICD-10-CM

## 2019-08-14 DIAGNOSIS — R53.1 DECREASED STRENGTH, ENDURANCE, AND MOBILITY: ICD-10-CM

## 2019-08-14 DIAGNOSIS — M25.60 DECREASED RANGE OF MOTION: ICD-10-CM

## 2019-08-14 PROCEDURE — 97110 THERAPEUTIC EXERCISES: CPT | Mod: PN

## 2019-08-14 NOTE — PROGRESS NOTES
"  Physical Therapy Daily Treatment Note     Name: Luciano Henriquez Jr.  Clinic Number: [id number]    Therapy Diagnosis:   Encounter Diagnoses   Name Primary?    Acute bilateral low back pain with bilateral sciatica     Decreased range of motion     Decreased strength, endurance, and mobility        Physician: Juan Arteaga NP    Visit Date: 8/14/2019    Physician Orders: PT Eval and Treat  Medical Diagnosis from Referral: M54.41,M54.42,G89.29 (ICD-10-CM) - Chronic midline low back pain with bilateral sciatica  Evaluation Date: 7/26/2019  Authorization Period Expiration: 12/31/2019  Plan of Care Certification Period: 7/26/2019 to 10/4/2019 (PN due by 8/26/2019)  Visit #/Visits authorized: 6 / 20     Time In: 10:00am  Time Out: 11:15am  Total Treatment Time: 75 minutes  Total Billable Time: 45 minutes    Precautions: Standard    Subjective     Pt reports: c/o continued minimal low back pain  He was compliant with home exercise program.  Response to previous treatment: good, appropriate muscle response  Functional change: none to note    Pain: 3/10  Location: bilateral back      Objective     Luciano received therapeutic exercises to develop strength, endurance, ROM, flexibility, posture and core stabilization for 75 minutes including:    Nustep x 8 minutes    SKTC 5 x 5" hold ea. /c towel assist  DKTC on SB x 20   LTR on SB x 20   Supine HS stretch /c strap: 2 x 30" hold ea.  Supine hip flexor stretch: 2 x 30" hold ea.    +Lumbar Extension machine: 40# x 15 (ROM 0-65)  +Torso Rotation: 20# x 15 ea direction  +Matrix Chest Press: 20# x 20-   +Matrix Narrow Row: 30# x 20-  +Matrix Knee Extension: 20# x 20 RPE 4  +Matrix Hamstring Curl: 25# x 20 RPE 3  +Matrix Hip Abd: 25# x 20 RPE 2  +Matrix Hip Add 30# x 20, RPE 3    Luciano received the following manual therapy techniques: none    Home Exercises Provided and Patient Education Provided     Education provided:   - continued compliance with HEP    Written Home " Exercises Provided: Patient instructed to cont prior HEP.  Exercises were reviewed and Luciano was able to demonstrate them prior to the end of the session.  Luciano demonstrated good  understanding of the education provided.     See EMR under Patient Instructions for exercises provided prior visit.    Assessment     Patient tolerated treatment session well today with no adverse reaction. Continued a modified healthy back program this session to focus on lumbar extensor and global muscle strengthening, with addition of hip abd/add today. Reported decrease in pain/stiffness following treatment session. Pt educated on healthy back program and when pt's typically see progress (10th visit). Patient with good tolerance to exercises noting appropriate muscle response.     Luciano is progressing well towards his goals.   Pt prognosis is Good.     Pt will continue to benefit from skilled outpatient physical therapy to address the deficits listed in the problem list box on initial evaluation, provide pt/family education and to maximize pt's level of independence in the home and community environment.     Pt's spiritual, cultural and educational needs considered and pt agreeable to plan of care and goals.    Anticipated barriers to physical therapy: chronic pain    Goals: Short Term Goals:  5 weeks  1. Report decreased in pain at worse less than  <   / =  4  /10  to increase tolerance for functional activities  2. Pt to increase B popliteal angle by greater than > or = 5 degrees in order to improve flexibility and posture.   3. Increased BLE MMT 1/2  to increase tolerance for ADL and work activities.  4. Pt to tolerate HEP to improve ROM and independence with ADL's  5. Pt to improve range of motion by 25% to allow for improved functional mobility to allow for improvement in IADLs.     Plan     Assess tolerance to treatment session and progress per POC.     Stacie Sharma, PT, DPT

## 2019-08-16 ENCOUNTER — PATIENT OUTREACH (OUTPATIENT)
Dept: OTHER | Facility: OTHER | Age: 69
End: 2019-08-16

## 2019-08-16 ENCOUNTER — CLINICAL SUPPORT (OUTPATIENT)
Dept: REHABILITATION | Facility: HOSPITAL | Age: 69
End: 2019-08-16
Attending: NURSE PRACTITIONER
Payer: MEDICARE

## 2019-08-16 DIAGNOSIS — M54.41 ACUTE BILATERAL LOW BACK PAIN WITH BILATERAL SCIATICA: ICD-10-CM

## 2019-08-16 DIAGNOSIS — M25.60 DECREASED RANGE OF MOTION: ICD-10-CM

## 2019-08-16 DIAGNOSIS — R68.89 DECREASED STRENGTH, ENDURANCE, AND MOBILITY: ICD-10-CM

## 2019-08-16 DIAGNOSIS — M54.42 ACUTE BILATERAL LOW BACK PAIN WITH BILATERAL SCIATICA: ICD-10-CM

## 2019-08-16 DIAGNOSIS — R53.1 DECREASED STRENGTH, ENDURANCE, AND MOBILITY: ICD-10-CM

## 2019-08-16 DIAGNOSIS — Z74.09 DECREASED STRENGTH, ENDURANCE, AND MOBILITY: ICD-10-CM

## 2019-08-16 PROCEDURE — 97110 THERAPEUTIC EXERCISES: CPT | Mod: PN

## 2019-08-16 NOTE — PROGRESS NOTES
"  Physical Therapy Daily Treatment Note     Name: Luciano Henriquez Jr.  Clinic Number: [id number]    Therapy Diagnosis:   Encounter Diagnoses   Name Primary?    Acute bilateral low back pain with bilateral sciatica     Decreased range of motion     Decreased strength, endurance, and mobility        Physician: Juan Arteaga NP    Visit Date: 8/16/2019    Physician Orders: PT Eval and Treat  Medical Diagnosis from Referral: M54.41,M54.42,G89.29 (ICD-10-CM) - Chronic midline low back pain with bilateral sciatica  Evaluation Date: 7/26/2019  Authorization Period Expiration: 12/31/2019  Plan of Care Certification Period: 7/26/2019 to 10/4/2019 (PN due by 8/26/2019)  Visit #/Visits authorized: 7 / 20     Time In: 1:00pm  Time Out: 2:00pm  Total Treatment Time: 60 minutes  Total Billable Time: 60  minutes    Precautions: Standard    Subjective     Pt reports: c/o continued minimal low back pain  He was compliant with home exercise program.  Response to previous treatment: good, appropriate muscle response  Functional change: none to note    Pain: 3/10  Location: bilateral back      Objective     Luciano received therapeutic exercises to develop strength, endurance, ROM, flexibility, posture and core stabilization for 60 minutes including:    Nustep x 8 minutes    SKTC 5 x 5" hold ea. /c towel assist-NP  DKTC on SB x 20   LTR on SB x 20-NP  Supine HS stretch /c strap: 2 x 30" hold ea.  Supine hip flexor stretch: 2 x 30" hold ea.    +Lumbar Extension machine: 40# x 15 (ROM 0-70)  +Torso Rotation: 20# x 15 ea direction  +Matrix Chest Press: 20# x 20- RPE 4  +Matrix Narrow Row: 30# x 20 RPE 3  +Matrix triceps extension 40# x 20 RPE 3  +Matrix arm curl 30# x20 RPE 4  +Matrix Knee Extension: 20# x 20 RPE 4  +Matrix Hamstring Curl: 25# x 20 RPE 3  +Matrix Hip Abd: 35# x 20 RPE 3  +Matrix Hip Add 30# x 20, RPE 3     Luciano received the following manual therapy techniques: none    Home Exercises Provided and Patient Education " Provided     Education provided:   - continued compliance with HEP    Written Home Exercises Provided: Patient instructed to cont prior HEP.  Exercises were reviewed and Luciano was able to demonstrate them prior to the end of the session.  Luciano demonstrated good  understanding of the education provided.     See EMR under Patient Instructions for exercises provided prior visit.    Assessment     Patient tolerated treatment session well today with no adverse reaction. Continued a modified healthy back program this session to focus on lumbar extensor and global muscle strengthening, with addition of arm curl/triceps extension today. Reported decrease in pain/stiffness following treatment session. Pt educated on healthy back program and when pt's typically see progress (10th visit). Patient with good tolerance to exercises noting appropriate muscle response.     Luciano is progressing well towards his goals.   Pt prognosis is Good.     Pt will continue to benefit from skilled outpatient physical therapy to address the deficits listed in the problem list box on initial evaluation, provide pt/family education and to maximize pt's level of independence in the home and community environment.     Pt's spiritual, cultural and educational needs considered and pt agreeable to plan of care and goals.    Anticipated barriers to physical therapy: chronic pain    Goals: Short Term Goals:  5 weeks  1. Report decreased in pain at worse less than  <   / =  4  /10  to increase tolerance for functional activities  2. Pt to increase B popliteal angle by greater than > or = 5 degrees in order to improve flexibility and posture.   3. Increased BLE MMT 1/2  to increase tolerance for ADL and work activities.  4. Pt to tolerate HEP to improve ROM and independence with ADL's  5. Pt to improve range of motion by 25% to allow for improved functional mobility to allow for improvement in IADLs.     Plan     Assess tolerance to treatment session and  progress per POC.     Stacie Sharma, PT, DPT

## 2019-08-16 NOTE — PROGRESS NOTES
Last 5 Patient Entered Readings                                      Current 30 Day Average: 132/78     Recent Readings 8/15/2019 8/15/2019 8/3/2019 7/27/2019 7/22/2019    SBP (mmHg) 137 121 121 132 139    DBP (mmHg) 82 77 77 74 78    Pulse 58 62 62 63 65        [facility]: Health  Follow Up    Called pt for f/u. Mr. Henriquez continues to follow [employer] diet and feels he is doing well with this and noted a 10# wt loss. He has been experiencing some lower back pain and has been going to PT 2 days a week in addition to doing core strengthening exercises at home. Pt states that he is happy with BP readings and voiced no questions or concerns. Praised pt for maintaining healthy habits and encouraged to continue.    Follow up with Mr. Luciano Henriquez . completed. No further questions or concerns. Will continue to follow up to achieve health goals.

## 2019-08-21 ENCOUNTER — CLINICAL SUPPORT (OUTPATIENT)
Dept: REHABILITATION | Facility: HOSPITAL | Age: 69
End: 2019-08-21
Attending: NURSE PRACTITIONER
Payer: MEDICARE

## 2019-08-21 DIAGNOSIS — M54.42 ACUTE BILATERAL LOW BACK PAIN WITH BILATERAL SCIATICA: ICD-10-CM

## 2019-08-21 DIAGNOSIS — M25.60 DECREASED RANGE OF MOTION: ICD-10-CM

## 2019-08-21 DIAGNOSIS — R53.1 DECREASED STRENGTH, ENDURANCE, AND MOBILITY: ICD-10-CM

## 2019-08-21 DIAGNOSIS — Z74.09 DECREASED STRENGTH, ENDURANCE, AND MOBILITY: ICD-10-CM

## 2019-08-21 DIAGNOSIS — R68.89 DECREASED STRENGTH, ENDURANCE, AND MOBILITY: ICD-10-CM

## 2019-08-21 DIAGNOSIS — M54.41 ACUTE BILATERAL LOW BACK PAIN WITH BILATERAL SCIATICA: ICD-10-CM

## 2019-08-21 PROCEDURE — 97750 PHYSICAL PERFORMANCE TEST: CPT | Mod: PN

## 2019-08-21 PROCEDURE — 97110 THERAPEUTIC EXERCISES: CPT | Mod: PN

## 2019-08-21 NOTE — PROGRESS NOTES
"  Physical Therapy Daily Treatment Note     Name: Luciano Henriquez Jr.    Clinic Number: [id number]    Therapy Diagnosis:   Encounter Diagnoses   Name Primary?    Acute bilateral low back pain with bilateral sciatica     Decreased range of motion     Decreased strength, endurance, and mobility        Physician: Juan Arteaga NP    Visit Date: 8/21/2019    Physician Orders: PT Eval and Treat  Medical Diagnosis from Referral: M54.41,M54.42,G89.29 (ICD-10-CM) - Chronic midline low back pain with bilateral sciatica  Evaluation Date: 7/26/2019  Authorization Period Expiration: 12/31/2019  Plan of Care Certification Period: 7/26/2019 to 10/4/2019 (PN due by 8/26/2019)  Visit #/Visits authorized: 8 / 20     Time In: 9:00pm  Time Out: 10:10pm  Total Treatment Time: 70 minutes  Total Billable Time: 60  minutes    Precautions: Standard    Subjective     Pt reports: c/o continued minimal low back pain  He was compliant with home exercise program.  Response to previous treatment: good, appropriate muscle response  Functional change: none to note    Pain: 1/10  Location: bilateral back      Objective     Luciano received therapeutic exercises to develop strength, endurance, ROM, flexibility, posture and core stabilization for 60 minutes including:    Nustep x 8 minutes    SKTC 5 x 5" hold ea. /c towel assist-NP  DKTC on SB x 20   LTR on SB x 20-NP  Supine HS stretch /c strap: 2 x 30" hold ea.  Supine hip flexor stretch: 2 x 30" hold ea.    [facility] 8/21/2019   Visit Number 8   VAS Pain Rating 3   Recumbent Bike Seat Pos. 0   Time 8   Lumbar Extension Seat Pad 0   Femur Restraint 5   Top Dead Center 24   Counterweight 140   Lumbar Flexion 60   Lumbar Extension 0   Lumbar Peak Torque 90   Min Torque 59   Test Percent Below Normative Data 56         Peripheral muscle strengthening which included 1 set of 15-20 repetitions at a slow, controlled 10-13 second per rep pace focused on strengthening supporting musculature " for improved body mechanics and functional mobility.  Pt and therapist focused on proper form during treatment to ensure optimal strengthening of each targeted muscle group.  Machines were utilized including torso rotation, leg extension, leg curl, chest press, upright row, tricep extension, bicep curl, leg press, hip add, and hip abduction.      Luciano received the following manual therapy techniques: none    Home Exercises Provided and Patient Education Provided     Education provided:   - continued compliance with HEP    Written Home Exercises Provided: Patient instructed to cont prior HEP.  Exercises were reviewed and Luciano was able to demonstrate them prior to the end of the session.  Luciano demonstrated good  understanding of the education provided.     See EMR under Patient Instructions for exercises provided prior visit.    Assessment     Patient tolerated treatment session well today with no adverse reaction. Reported decrease in pain/stiffness following treatment session. Pt performed MedX strength testing and was placed into healthy back program for rest of visits. Pt agreed on plan of care going forward. Patient with good tolerance to exercises noting appropriate muscle response.     Luciano is progressing well towards his goals.   Pt prognosis is Good.     Pt will continue to benefit from skilled outpatient physical therapy to address the deficits listed in the problem list box on initial evaluation, provide pt/family education and to maximize pt's level of independence in the home and community environment.     Pt's spiritual, cultural and educational needs considered and pt agreeable to plan of care and goals.    Anticipated barriers to physical therapy: chronic pain    Goals: Short Term Goals:  5 weeks  1. Report decreased in pain at worse less than  <   / =  4  /10  to increase tolerance for functional activities  2. Pt to increase B popliteal angle by greater than > or = 5 degrees in order to improve  flexibility and posture.   3. Increased BLE MMT 1/2  to increase tolerance for ADL and work activities.  4. Pt to tolerate HEP to improve ROM and independence with ADL's  5. Pt to improve range of motion by 25% to allow for improved functional mobility to allow for improvement in IADLs.     Long Term Goals: 10 weeks  1. Report decreased in pain at worse less than  <   / =  2  /10  to increase tolerance for functional mobility.   2 .Pt to increase B popliteal angle by greater than > or = 10 degrees in order to improve flexibility and posture.   3. Increased BLE MMT 1 grade to increase tolerance for ADL and work activities.  4. Pt will report at CJ% or less limitation on FOTO Lumbar  to demonstrate decrease in disability and improvement in back pain.  5. Pt to be Independent with HEP to improve ROM and independence with ADL's  6. Pt to demonstrate negative Bridge Test in order to show improved core strength for lumbar stabilization.   7. Pt to improve range of motion by 75% to allow for improved functional mobility to allow for improvement in IADLs.   8. Pt will report ability to ambulate 20 minutes without increased lumbar pain.   9. Pt will improve MedX testing score to improve by 20 percent to demonstrate improved motor control and strength.     Plan     Assess tolerance to treatment session and progress per POC.     Stacie Sharma, PT, DPT

## 2019-08-23 ENCOUNTER — CLINICAL SUPPORT (OUTPATIENT)
Dept: REHABILITATION | Facility: HOSPITAL | Age: 69
End: 2019-08-23
Attending: NURSE PRACTITIONER
Payer: MEDICARE

## 2019-08-23 DIAGNOSIS — R53.1 DECREASED STRENGTH, ENDURANCE, AND MOBILITY: ICD-10-CM

## 2019-08-23 DIAGNOSIS — Z74.09 DECREASED STRENGTH, ENDURANCE, AND MOBILITY: ICD-10-CM

## 2019-08-23 DIAGNOSIS — M54.42 ACUTE BILATERAL LOW BACK PAIN WITH BILATERAL SCIATICA: ICD-10-CM

## 2019-08-23 DIAGNOSIS — R68.89 DECREASED STRENGTH, ENDURANCE, AND MOBILITY: ICD-10-CM

## 2019-08-23 DIAGNOSIS — M25.60 DECREASED RANGE OF MOTION: ICD-10-CM

## 2019-08-23 DIAGNOSIS — M54.41 ACUTE BILATERAL LOW BACK PAIN WITH BILATERAL SCIATICA: ICD-10-CM

## 2019-08-23 PROCEDURE — 97110 THERAPEUTIC EXERCISES: CPT | Mod: PN

## 2019-08-23 NOTE — PROGRESS NOTES
"  Physical Therapy Daily Treatment Note     Name: Luciano Henriquez Jr.    Clinic Number: [id number]    Therapy Diagnosis:   Encounter Diagnoses   Name Primary?    Acute bilateral low back pain with bilateral sciatica     Decreased range of motion     Decreased strength, endurance, and mobility        Physician: Juan Arteaga NP    Visit Date: 8/23/2019    Physician Orders: PT Eval and Treat  Medical Diagnosis from Referral: M54.41,M54.42,G89.29 (ICD-10-CM) - Chronic midline low back pain with bilateral sciatica  Evaluation Date: 7/26/2019  Authorization Period Expiration: 12/31/2019  Plan of Care Certification Period: 7/26/2019 to 10/4/2019 (PN due by 8/26/2019)  Visit #/Visits authorized: 9 / 20     Time In: 10:00am  Time Out: 11:00am  Total Treatment Time: 60 minutes  Total Billable Time: 30  minutes    Precautions: Standard    Subjective     Pt reports: c/o continued minimal low back pain  He was compliant with home exercise program.  Response to previous treatment: good, appropriate muscle response  Functional change: less pain    Pain: 3/10  Location: bilateral back      Objective     Luciano received therapeutic exercises to develop strength, endurance, ROM, flexibility, posture and core stabilization for 60 minutes including:    Nustep x 8 minutes    DKTC on SB x 20   LTR on SB x 20  Supine HS stretch /c strap: 2 x 30" hold ea.      [facility] 8/23/2019   Visit Number 9   VAS Pain Rating 3   NuStep 11   Time 8   Lumbar Extension Seat Pad 0   Femur Restraint 5   Top Dead Center 24   Counterweight 140   Lumbar Flexion 60   Lumbar Extension 0   Lumbar Peak Torque 90   Min Torque 59   Test Percent Below Normative Data 56       Peripheral muscle strengthening which included 1 set of 15-20 repetitions at a slow, controlled 10-13 second per rep pace focused on strengthening supporting musculature for improved body mechanics and functional mobility.  Pt and therapist focused on proper form during " treatment to ensure optimal strengthening of each targeted muscle group.  Machines were utilized including torso rotation, leg extension, leg curl, chest press, upright row, tricep extension, bicep curl, leg press, hip add, and hip abduction.      Luciano received the following manual therapy techniques: none    Pt wears pain patch. Refuses hot/cold pack after treatment session.    Home Exercises Provided and Patient Education Provided     Education provided:   - continued compliance with HEP    Written Home Exercises Provided: Patient instructed to cont prior HEP.  Exercises were reviewed and Luciano was able to demonstrate them prior to the end of the session.  Luciano demonstrated good  understanding of the education provided.     See EMR under Patient Instructions for exercises provided prior visit.    Assessment     Patient tolerated treatment session well today. Reported decrease in pain/stiffness following treatment session. Pt performed MedX strengthening and was placed through Matrix machines without adverse effect. Patient with good tolerance to exercises noting appropriate muscle response.     Luciano is progressing well towards his goals.   Pt prognosis is Good.     Pt will continue to benefit from skilled outpatient physical therapy to address the deficits listed in the problem list box on initial evaluation, provide pt/family education and to maximize pt's level of independence in the home and community environment.     Pt's spiritual, cultural and educational needs considered and pt agreeable to plan of care and goals.    Anticipated barriers to physical therapy: chronic pain    Goals: Short Term Goals:  5 weeks  1. Report decreased in pain at worse less than  <   / =  4  /10  to increase tolerance for functional activities. ongoing  2. Pt to increase B popliteal angle by greater than > or = 5 degrees in order to improve flexibility and posture.   3. Increased BLE MMT 1/2  to increase tolerance for ADL and  work activities.  4. Pt to tolerate HEP to improve ROM and independence with ADL's. Met 8/23/19  5. Pt to improve range of motion by 25% to allow for improved functional mobility to allow for improvement in IADLs.      Long Term Goals: 10 weeks  1. Report decreased in pain at worse less than  <   / =  2  /10  to increase tolerance for functional mobility.   2 .Pt to increase B popliteal angle by greater than > or = 10 degrees in order to improve flexibility and posture.   3. Increased BLE MMT 1 grade to increase tolerance for ADL and work activities.  4. Pt will report at CJ% or less limitation on FOTO Lumbar  to demonstrate decrease in disability and improvement in back pain.  5. Pt to be Independent with HEP to improve ROM and independence with ADL's  6. Pt to demonstrate negative Bridge Test in order to show improved core strength for lumbar stabilization.   7. Pt to improve range of motion by 75% to allow for improved functional mobility to allow for improvement in IADLs.   8. Pt will report ability to ambulate 20 minutes without increased lumbar pain.   9. Pt will improve MedX testing score to improve by 20 percent to demonstrate improved motor control and strength.     Plan     Assess tolerance to treatment session and progress per POC.     Stacie Sharma, PT, DPT

## 2019-08-27 ENCOUNTER — CLINICAL SUPPORT (OUTPATIENT)
Dept: REHABILITATION | Facility: HOSPITAL | Age: 69
End: 2019-08-27
Attending: NURSE PRACTITIONER
Payer: MEDICARE

## 2019-08-27 DIAGNOSIS — M54.41 ACUTE BILATERAL LOW BACK PAIN WITH BILATERAL SCIATICA: ICD-10-CM

## 2019-08-27 DIAGNOSIS — R53.1 DECREASED STRENGTH, ENDURANCE, AND MOBILITY: ICD-10-CM

## 2019-08-27 DIAGNOSIS — R68.89 DECREASED STRENGTH, ENDURANCE, AND MOBILITY: ICD-10-CM

## 2019-08-27 DIAGNOSIS — Z74.09 DECREASED STRENGTH, ENDURANCE, AND MOBILITY: ICD-10-CM

## 2019-08-27 DIAGNOSIS — M25.60 DECREASED RANGE OF MOTION: ICD-10-CM

## 2019-08-27 DIAGNOSIS — M54.42 ACUTE BILATERAL LOW BACK PAIN WITH BILATERAL SCIATICA: ICD-10-CM

## 2019-08-27 PROCEDURE — 97110 THERAPEUTIC EXERCISES: CPT | Mod: PN

## 2019-08-27 NOTE — PROGRESS NOTES
"  Physical Therapy Daily Treatment Note     Name: Luciano Henriquez Jr.    Clinic Number: [id number]    Therapy Diagnosis:   Encounter Diagnoses   Name Primary?    Acute bilateral low back pain with bilateral sciatica     Decreased range of motion     Decreased strength, endurance, and mobility        Physician: Juan Arteaga NP    Visit Date: 8/27/2019    Physician Orders: PT Eval and Treat  Medical Diagnosis from Referral: M54.41,M54.42,G89.29 (ICD-10-CM) - Chronic midline low back pain with bilateral sciatica  Evaluation Date: 7/26/2019  Authorization Period Expiration: 12/31/2019  Plan of Care Certification Period: 7/26/2019 to 10/4/2019 (PN due by 9/26/2019)  Visit #/Visits authorized: 10 / 20     Time In: 12:00pm  Time Out: 1:00pm  Total Treatment Time: 60 minutes  Total Billable Time: 60 minutes    Precautions: Standard    Subjective     Pt reports: c/o continued minimal low back pain  He was compliant with home exercise program.  Response to previous treatment: good, appropriate muscle response  Functional change: less pain    Pain: 3/10  Location: bilateral back      Objective     Luciano received therapeutic exercises to develop strength, endurance, ROM, flexibility, posture and core stabilization for 60 minutes including:    Nustep x 8 minutes    DKTC on SB x 20   LTR on SB x 20  Supine HS stretch /c strap: 2 x 30" hold ea.    Lumbar Range of Motion:     %   Flexion WFL      Extension WFL      Left Side Bending min   Right Side Bending min   Left rotation    min   Right Rotation    min    *= pain     Right LE   Left LE     Hip flexion: 4/5 Hip flexion: 4/5   Knee extension: 4+/5 Knee extension: 4+/5   Knee flexion: 4+/5 Knee flexion: 4+/5   Hip IR: 4+/5 Hip IR: 4+/5   Hip ER: 4+/5 Hip ER: 4+/5   Hip extension:  4/5 Hip extension: 4/5   Hip abduction: 4/5 Hip abduction: 4/5   Hip adduction: 4/5 Hip adduction 4/5   Ankle dorsiflexion: 5/5 Ankle dorsiflexion: 5/5   Ankle plantarflexion: 4+/5 Ankle " plantarflexion: 4+/5      Popliteal Angle: R = 170 degrees ; L = 165 degrees    [facility] 8/27/2019   Visit Number 10   VAS Pain Rating 3   NuStep seat 11   Time 8   Lumbar Extension Seat Pad 0   Femur Restraint 5   Top Dead Center 24   Counterweight 140   Lumbar Flexion 60   Lumbar Extension 0   Lumbar Peak Torque 90   Min Torque 59   Test Percent Below Normative Data 56       Peripheral muscle strengthening which included 1 set of 15-20 repetitions at a slow, controlled 10-13 second per rep pace focused on strengthening supporting musculature for improved body mechanics and functional mobility.  Pt and therapist focused on proper form during treatment to ensure optimal strengthening of each targeted muscle group.  Machines were utilized including torso rotation, leg extension, leg curl, chest press, upright row, tricep extension, bicep curl, leg press, hip add, and hip abduction.      Luciano received the following manual therapy techniques: none    Pt wears pain patch. Refuses hot/cold pack after treatment session.    Home Exercises Provided and Patient Education Provided     Education provided:   - continued compliance with HEP    Written Home Exercises Provided: Patient instructed to cont prior HEP.  Exercises were reviewed and Luciano was able to demonstrate them prior to the end of the session.  Luciano demonstrated good  understanding of the education provided.     See EMR under Patient Instructions for exercises provided prior visit.    Assessment     Patient tolerated treatment session well today. Reported decrease in pain/stiffness following treatment session. Pt performed MedX strengthening and was placed through Matrix machines without adverse effect. PT assessed goals, LE strength, and lumbar range of motion. Pt pt meeting 4/5 short term goals, improvement in all LE muscle groups, and improvement in lumbar range of motion in each direction. Pt reports no sciatic symptoms over last few weeks.  Attempt treadmill at next visit to assess pt ability to ambulate without increased pain. Patient with good tolerance to exercises noting appropriate muscle response.     Luciano is progressing well towards his goals.   Pt prognosis is Good.     Pt will continue to benefit from skilled outpatient physical therapy to address the deficits listed in the problem list box on initial evaluation, provide pt/family education and to maximize pt's level of independence in the home and community environment.     Pt's spiritual, cultural and educational needs considered and pt agreeable to plan of care and goals.    Anticipated barriers to physical therapy: chronic pain    Goals: Short Term Goals:  5 weeks  1. Report decreased in pain at worse less than  <   / =  4  /10  to increase tolerance for functional activities. Ongoing 5/10  2. Pt to increase B popliteal angle by greater than > or = 5 degrees in order to improve flexibility and posture. Met 8/27/19  3. Increased BLE MMT 1/2  to increase tolerance for ADL and work activities. Met 8/27/19  4. Pt to tolerate HEP to improve ROM and independence with ADL's. Met 8/23/19  5. Pt to improve range of motion by 25% to allow for improved functional mobility to allow for improvement in IADLs.  Met 8/27/19    Long Term Goals: 10 weeks  1. Report decreased in pain at worse less than  <   / =  2  /10  to increase tolerance for functional mobility.   2 .Pt to increase B popliteal angle by greater than > or = 10 degrees in order to improve flexibility and posture.   3. Increased BLE MMT 1 grade to increase tolerance for ADL and work activities.  4. Pt will report at CJ% or less limitation on FOTO Lumbar  to demonstrate decrease in disability and improvement in back pain.  5. Pt to be Independent with HEP to improve ROM and independence with ADL's  6. Pt to demonstrate negative Bridge Test in order to show improved core strength for lumbar stabilization.   7. Pt to improve range of motion  by 75% to allow for improved functional mobility to allow for improvement in IADLs.   8. Pt will report ability to ambulate 20 minutes without increased lumbar pain.   9. Pt will improve MedX testing score to improve by 20 percent to demonstrate improved motor control and strength.     Plan     Assess tolerance to treatment session and progress per POC.     Stacie Sharma, PT, DPT

## 2019-08-29 ENCOUNTER — PATIENT OUTREACH (OUTPATIENT)
Dept: OTHER | Facility: OTHER | Age: 69
End: 2019-08-29

## 2019-08-29 NOTE — PROGRESS NOTES
Last 5 Patient Entered Readings                                      Current 30 Day Average: 133/80     Recent Readings 8/25/2019 8/24/2019 8/24/2019 8/24/2019 8/15/2019    SBP (mmHg) 132 142 150 142 137    DBP (mmHg) 76 83 80 87 82    Pulse 65 61 60 59 58        LVM to discuss BP readings and medications.

## 2019-08-30 ENCOUNTER — CLINICAL SUPPORT (OUTPATIENT)
Dept: REHABILITATION | Facility: HOSPITAL | Age: 69
End: 2019-08-30
Attending: NURSE PRACTITIONER
Payer: MEDICARE

## 2019-08-30 DIAGNOSIS — M54.42 ACUTE BILATERAL LOW BACK PAIN WITH BILATERAL SCIATICA: ICD-10-CM

## 2019-08-30 DIAGNOSIS — R68.89 DECREASED STRENGTH, ENDURANCE, AND MOBILITY: ICD-10-CM

## 2019-08-30 DIAGNOSIS — R53.1 DECREASED STRENGTH, ENDURANCE, AND MOBILITY: ICD-10-CM

## 2019-08-30 DIAGNOSIS — Z74.09 DECREASED STRENGTH, ENDURANCE, AND MOBILITY: ICD-10-CM

## 2019-08-30 DIAGNOSIS — M54.41 ACUTE BILATERAL LOW BACK PAIN WITH BILATERAL SCIATICA: ICD-10-CM

## 2019-08-30 DIAGNOSIS — M25.60 DECREASED RANGE OF MOTION: ICD-10-CM

## 2019-08-30 PROCEDURE — 97110 THERAPEUTIC EXERCISES: CPT | Mod: PN

## 2019-08-30 NOTE — PROGRESS NOTES
"  Physical Therapy Daily Treatment Note     Name: Luciano Henriquez Jr.    Clinic Number: [id number]    Therapy Diagnosis:   Encounter Diagnoses   Name Primary?    Acute bilateral low back pain with bilateral sciatica     Decreased range of motion     Decreased strength, endurance, and mobility        Physician: Juan Arteaga NP    Visit Date: 8/30/2019    Physician Orders: PT Eval and Treat  Medical Diagnosis from Referral: M54.41,M54.42,G89.29 (ICD-10-CM) - Chronic midline low back pain with bilateral sciatica  Evaluation Date: 7/26/2019  Authorization Period Expiration: 12/31/2019  Plan of Care Certification Period: 7/26/2019 to 10/4/2019 (PN due by 9/26/2019)  Visit #/Visits authorized: 11 / 20     Time In: 11:00am  Time Out: 12:00pm  Total Treatment Time: 60 minutes  Total Billable Time: 30 minutes    Precautions: Standard    Subjective     Pt reports: c/o continued minimal low back pain  He was compliant with home exercise program.  Response to previous treatment: good, appropriate muscle response  Functional change: less pain    Pain: 3/10  Location: bilateral back      Objective     Luciano received therapeutic exercises to develop strength, endurance, ROM, flexibility, posture and core stabilization for 60 minutes including:    Nustep x 8 minutes-NP  Treadmill 2.0mph for 7'    DKTC on SB x 20   LTR on SB x 20  Supine HS stretch /c strap: 2 x 30" hold ea.  +Sit<>stand from 18inch platform x 10      [facility] 8/27/2019   Visit Number 11   VAS Pain Rating 3   Treadmill 2.0   Time 7   Lumbar Extension Seat Pad 0   Femur Restraint 5   Top Dead Center 24   Counterweight 140   Lumbar Flexion 60   Lumbar Extension 0   Lumbar Peak Torque 90   Min Torque 59   Test Percent Below Normative Data 56       Peripheral muscle strengthening which included 1 set of 15-20 repetitions at a slow, controlled 10-13 second per rep pace focused on strengthening supporting musculature for improved body mechanics and " functional mobility.  Pt and therapist focused on proper form during treatment to ensure optimal strengthening of each targeted muscle group.  Machines were utilized including torso rotation, leg extension, leg curl, chest press, upright row, tricep extension, bicep curl, leg press, hip add, and hip abduction.      Luciano received the following manual therapy techniques: none    Pt wears pain patch. Refuses hot/cold pack after treatment session.    Home Exercises Provided and Patient Education Provided     Education provided:   - continued compliance with HEP    Written Home Exercises Provided: Patient instructed to cont prior HEP.  Exercises were reviewed and Luciano was able to demonstrate them prior to the end of the session.  Luciano demonstrated good  understanding of the education provided.     See EMR under Patient Instructions for exercises provided prior visit.    Assessment     Patient tolerated treatment session well today. Reported increase in pain for 24 hours following last treatment session. Decreased repetitions this visit and will assess pain at next visit. Told pt not to wear pain patch at next visit to be able to lay on cold pack following treatment session. Pt performed MedX strengthening and was placed through Matrix machines without adverse effect. PT assessed goals, LE strength, and lumbar range of motion. Ambulated on treadmill for 7 minutes today without increase in pain.  Patient with good tolerance to exercises noting appropriate muscle response.     Luciano is progressing well towards his goals.   Pt prognosis is Good.     Pt will continue to benefit from skilled outpatient physical therapy to address the deficits listed in the problem list box on initial evaluation, provide pt/family education and to maximize pt's level of independence in the home and community environment.     Pt's spiritual, cultural and educational needs considered and pt agreeable to plan of care and goals.    Anticipated  barriers to physical therapy: chronic pain    Goals: Short Term Goals:  5 weeks  1. Report decreased in pain at worse less than  <   / =  4  /10  to increase tolerance for functional activities. Ongoing 5/10  2. Pt to increase B popliteal angle by greater than > or = 5 degrees in order to improve flexibility and posture. Met 8/27/19  3. Increased BLE MMT 1/2  to increase tolerance for ADL and work activities. Met 8/27/19  4. Pt to tolerate HEP to improve ROM and independence with ADL's. Met 8/23/19  5. Pt to improve range of motion by 25% to allow for improved functional mobility to allow for improvement in IADLs.  Met 8/27/19    Long Term Goals: 10 weeks  1. Report decreased in pain at worse less than  <   / =  2  /10  to increase tolerance for functional mobility.   2 .Pt to increase B popliteal angle by greater than > or = 10 degrees in order to improve flexibility and posture.   3. Increased BLE MMT 1 grade to increase tolerance for ADL and work activities.  4. Pt will report at CJ% or less limitation on FOTO Lumbar  to demonstrate decrease in disability and improvement in back pain.  5. Pt to be Independent with HEP to improve ROM and independence with ADL's  6. Pt to demonstrate negative Bridge Test in order to show improved core strength for lumbar stabilization.   7. Pt to improve range of motion by 75% to allow for improved functional mobility to allow for improvement in IADLs.   8. Pt will report ability to ambulate 20 minutes without increased lumbar pain.   9. Pt will improve MedX testing score to improve by 20 percent to demonstrate improved motor control and strength.     Plan     Assess tolerance to treatment session and progress per POC.     Stacie Sharma, PT, DPT

## 2019-09-04 ENCOUNTER — CLINICAL SUPPORT (OUTPATIENT)
Dept: REHABILITATION | Facility: HOSPITAL | Age: 69
End: 2019-09-04
Attending: NURSE PRACTITIONER
Payer: MEDICARE

## 2019-09-04 DIAGNOSIS — R53.1 DECREASED STRENGTH, ENDURANCE, AND MOBILITY: ICD-10-CM

## 2019-09-04 DIAGNOSIS — M54.42 ACUTE BILATERAL LOW BACK PAIN WITH BILATERAL SCIATICA: ICD-10-CM

## 2019-09-04 DIAGNOSIS — Z74.09 DECREASED STRENGTH, ENDURANCE, AND MOBILITY: ICD-10-CM

## 2019-09-04 DIAGNOSIS — R68.89 DECREASED STRENGTH, ENDURANCE, AND MOBILITY: ICD-10-CM

## 2019-09-04 DIAGNOSIS — M54.41 ACUTE BILATERAL LOW BACK PAIN WITH BILATERAL SCIATICA: ICD-10-CM

## 2019-09-04 DIAGNOSIS — M25.60 DECREASED RANGE OF MOTION: ICD-10-CM

## 2019-09-04 PROCEDURE — 97110 THERAPEUTIC EXERCISES: CPT | Mod: PN

## 2019-09-04 NOTE — PROGRESS NOTES
"  Physical Therapy Daily Treatment Note     Name: Luciano Henriquez Jr.    Clinic Number: [id number]    Therapy Diagnosis:   Encounter Diagnoses   Name Primary?    Acute bilateral low back pain with bilateral sciatica     Decreased range of motion     Decreased strength, endurance, and mobility        Physician: Juan Arteaga NP    Visit Date: 9/4/2019    Physician Orders: PT Eval and Treat  Medical Diagnosis from Referral: M54.41,M54.42,G89.29 (ICD-10-CM) - Chronic midline low back pain with bilateral sciatica  Evaluation Date: 7/26/2019  Authorization Period Expiration: 12/31/2019  Plan of Care Certification Period: 7/26/2019 to 10/4/2019 (PN due by 9/26/2019)  Visit #/Visits authorized: 12 / 20     Time In: 1030  Time Out: 1140  Total Treatment Time: 60 minutes  Total Billable Time: 30 minutes    Precautions: Standard    Subjective     Pt reports: I dont feel like the therapy is helping because I wake up and feel like I am starting all over again. Patient reports recently buying a theraball that he uses to stretch with. Patient was encouraged to perform HEP 2-3x/day, specifically in the morning and evening to reduce pain.   He was compliant with home exercise program.  Response to previous treatment: good, appropriate muscle response  Functional change: less pain    Pain: 3/10  Location: bilateral back      Objective     Luciano received therapeutic exercises to develop strength, endurance, ROM, flexibility, posture and core stabilization for 60 minutes including:    Treadmill for 8 minutes at 2.0mph    DKTC on SB x 20   LTR on SB x 20  Supine HS stretch /c strap: 2 x 30" hold ea.  Sit<>stand from 18inch platform x 10    [facility] 9/4/2019   Visit Number 9   VAS Pain Rating 3   Treadmill Time (in min.) 8   Speed 2   Incline 0   Flexion in Lying 20   Manual Therapy 0   Lumbar Weight 55   Repetitions 20   Rating of Perceived Exertion 3   Ice - Z Lie (in min.) 10     Peripheral muscle strengthening " which included 1 set of 15-20 repetitions at a slow, controlled 10-13 second per rep pace focused on strengthening supporting musculature for improved body mechanics and functional mobility.  Pt and therapist focused on proper form during treatment to ensure optimal strengthening of each targeted muscle group.  Machines were utilized including torso rotation, leg extension, leg curl, chest press, upright row, tricep extension, bicep curl, leg press, hip add, and hip abduction.    Luciano received the following manual therapy techniques: none    Pt wears pain patch. Refuses hot/cold pack after treatment session.    Home Exercises Provided and Patient Education Provided     Education provided:   - continued compliance with HEP    Written Home Exercises Provided: Patient instructed to cont prior HEP.  Exercises were reviewed and Luciano was able to demonstrate them prior to the end of the session.  Luciano demonstrated good  understanding of the education provided.     See EMR under Patient Instructions for exercises provided prior visit.    Assessment     Patient tolerated treatment session well with no adverse reaction. Good tolerance to MedX Lumbar Extension machine at 55 ft/lbs completing 20 repetitions at an RPE of 3. Patient tolerated peripheral strengthening well. Continue with current [facility] protocol.     Luciano is progressing well towards his goals.   Pt prognosis is Good.     Pt will continue to benefit from skilled outpatient physical therapy to address the deficits listed in the problem list box on initial evaluation, provide pt/family education and to maximize pt's level of independence in the home and community environment.     Pt's spiritual, cultural and educational needs considered and pt agreeable to plan of care and goals.    Anticipated barriers to physical therapy: chronic pain    Goals: Short Term Goals:  5 weeks  1. Report decreased in pain at worse less than  <   / =  4  /10  to increase  tolerance for functional activities. Ongoing 5/10  2. Pt to increase B popliteal angle by greater than > or = 5 degrees in order to improve flexibility and posture. Met 8/27/19  3. Increased BLE MMT 1/2  to increase tolerance for ADL and work activities. Met 8/27/19  4. Pt to tolerate HEP to improve ROM and independence with ADL's. Met 8/23/19  5. Pt to improve range of motion by 25% to allow for improved functional mobility to allow for improvement in IADLs.  Met 8/27/19    Long Term Goals: 10 weeks  1. Report decreased in pain at worse less than  <   / =  2  /10  to increase tolerance for functional mobility.   2 .Pt to increase B popliteal angle by greater than > or = 10 degrees in order to improve flexibility and posture.   3. Increased BLE MMT 1 grade to increase tolerance for ADL and work activities.  4. Pt will report at CJ% or less limitation on FOTO Lumbar  to demonstrate decrease in disability and improvement in back pain.  5. Pt to be Independent with HEP to improve ROM and independence with ADL's  6. Pt to demonstrate negative Bridge Test in order to show improved core strength for lumbar stabilization.   7. Pt to improve range of motion by 75% to allow for improved functional mobility to allow for improvement in IADLs.   8. Pt will report ability to ambulate 20 minutes without increased lumbar pain.   9. Pt will improve MedX testing score to improve by 20 percent to demonstrate improved motor control and strength.     Plan     Assess tolerance to treatment session and progress per POC.     Lissette Lundberg, PTA

## 2019-09-08 ENCOUNTER — PATIENT MESSAGE (OUTPATIENT)
Dept: FAMILY MEDICINE | Facility: CLINIC | Age: 69
End: 2019-09-08

## 2019-09-09 ENCOUNTER — CLINICAL SUPPORT (OUTPATIENT)
Dept: REHABILITATION | Facility: HOSPITAL | Age: 69
End: 2019-09-09
Attending: NURSE PRACTITIONER
Payer: MEDICARE

## 2019-09-09 DIAGNOSIS — R53.1 DECREASED STRENGTH, ENDURANCE, AND MOBILITY: ICD-10-CM

## 2019-09-09 DIAGNOSIS — M54.41 ACUTE BILATERAL LOW BACK PAIN WITH BILATERAL SCIATICA: ICD-10-CM

## 2019-09-09 DIAGNOSIS — M25.60 DECREASED RANGE OF MOTION: ICD-10-CM

## 2019-09-09 DIAGNOSIS — R68.89 DECREASED STRENGTH, ENDURANCE, AND MOBILITY: ICD-10-CM

## 2019-09-09 DIAGNOSIS — Z74.09 DECREASED STRENGTH, ENDURANCE, AND MOBILITY: ICD-10-CM

## 2019-09-09 DIAGNOSIS — M54.42 ACUTE BILATERAL LOW BACK PAIN WITH BILATERAL SCIATICA: ICD-10-CM

## 2019-09-09 PROCEDURE — 97110 THERAPEUTIC EXERCISES: CPT | Mod: PN

## 2019-09-09 NOTE — PROGRESS NOTES
"  Physical Therapy Daily Treatment Note     Name: Luciano Henriquez Jr.    Clinic Number: [id number]    Therapy Diagnosis:   Encounter Diagnoses   Name Primary?    Acute bilateral low back pain with bilateral sciatica     Decreased range of motion     Decreased strength, endurance, and mobility        Physician: Juan Arteaga NP    Visit Date: 9/9/2019    Physician Orders: PT Eval and Treat  Medical Diagnosis from Referral: M54.41,M54.42,G89.29 (ICD-10-CM) - Chronic midline low back pain with bilateral sciatica  Evaluation Date: 7/26/2019  Authorization Period Expiration: 12/31/2019  Plan of Care Certification Period: 7/26/2019 to 10/4/2019 (PN due by 9/26/2019)  Visit #/Visits authorized: 13 / 20 ([facility] visit 6)    Time In: 1005  Time Out: 1120  Total Treatment Time: 65 minutes  Total Billable Time: 35 minutes    Precautions: Standard    Subjective     Pt reports: increased pain this morning when he woke up. Patient states that he did his stretches and walked around, which helped to decrease his pain. Patient reports an appointment with pain management doctor next door tomorrow.   He was compliant with home exercise program.  Response to previous treatment: good, appropriate muscle response  Functional change: less pain    Pain: 3-4/10  Location: bilateral back      Objective     Luciano received therapeutic exercises to develop strength, endurance, ROM, flexibility, posture and core stabilization for 65 minutes including:    Treadmill for 10 minutes at 2.0mph    DKTC on SB x 20   LTR on SB x 20  Supine HS stretch /c strap: 2 x 30" hold ea.  Sit<>stand from 18inch platform x 10    [facility] 9/9/2019   Visit Number 6   VAS Pain Rating 4   Treadmill Time (in min.) 10   Speed 2   Incline 0   Flexion in Lying 20   Manual Therapy 0   Lumbar Weight 57   Repetitions 20   Rating of Perceived Exertion 3   Ice - Z Lie (in min.) 10     Peripheral muscle strengthening which included 1 set of 15-20 " repetitions at a slow, controlled 10-13 second per rep pace focused on strengthening supporting musculature for improved body mechanics and functional mobility.  Pt and therapist focused on proper form during treatment to ensure optimal strengthening of each targeted muscle group.  Machines were utilized including torso rotation, leg extension, leg curl, chest press, upright row, tricep extension, bicep curl, leg press, hip add, and hip abduction.    Luciano received the following manual therapy techniques: none    Home Exercises Provided and Patient Education Provided     Education provided:   - continued compliance with HEP    Written Home Exercises Provided: Patient instructed to cont prior HEP.  Exercises were reviewed and Luciano was able to demonstrate them prior to the end of the session.  Luciano demonstrated good  understanding of the education provided.     See EMR under Patient Instructions for exercises provided prior visit.    Assessment     Patient tolerated treatment session well with no adverse reaction. Increased weight on MedX Lumbar Extension machine to 57 ft/lbs. Patient able to complete 20 repetitions reporting an RPE of 3. Appropriate muscle response with global strengthening. Continue progressing patient per [facility] protocol.     Luciano is progressing well towards his goals.   Pt prognosis is Good.     Pt will continue to benefit from skilled outpatient physical therapy to address the deficits listed in the problem list box on initial evaluation, provide pt/family education and to maximize pt's level of independence in the home and community environment.     Pt's spiritual, cultural and educational needs considered and pt agreeable to plan of care and goals.    Anticipated barriers to physical therapy: chronic pain    Goals: Short Term Goals:  5 weeks  1. Report decreased in pain at worse less than  <   / =  4  /10  to increase tolerance for functional activities. Ongoing 5/10  2. Pt to increase  B popliteal angle by greater than > or = 5 degrees in order to improve flexibility and posture. Met 8/27/19  3. Increased BLE MMT 1/2  to increase tolerance for ADL and work activities. Met 8/27/19  4. Pt to tolerate HEP to improve ROM and independence with ADL's. Met 8/23/19  5. Pt to improve range of motion by 25% to allow for improved functional mobility to allow for improvement in IADLs.  Met 8/27/19    Long Term Goals: 10 weeks  1. Report decreased in pain at worse less than  <   / =  2  /10  to increase tolerance for functional mobility.   2 .Pt to increase B popliteal angle by greater than > or = 10 degrees in order to improve flexibility and posture.   3. Increased BLE MMT 1 grade to increase tolerance for ADL and work activities.  4. Pt will report at CJ% or less limitation on FOTO Lumbar  to demonstrate decrease in disability and improvement in back pain.  5. Pt to be Independent with HEP to improve ROM and independence with ADL's  6. Pt to demonstrate negative Bridge Test in order to show improved core strength for lumbar stabilization.   7. Pt to improve range of motion by 75% to allow for improved functional mobility to allow for improvement in IADLs.   8. Pt will report ability to ambulate 20 minutes without increased lumbar pain.   9. Pt will improve MedX testing score to improve by 20 percent to demonstrate improved motor control and strength.     Plan     Assess tolerance to treatment session and progress per POC.     Lissette Lundberg, PTA

## 2019-09-10 ENCOUNTER — PATIENT MESSAGE (OUTPATIENT)
Dept: FAMILY MEDICINE | Facility: CLINIC | Age: 69
End: 2019-09-10

## 2019-09-10 ENCOUNTER — PATIENT MESSAGE (OUTPATIENT)
Dept: PAIN MEDICINE | Facility: CLINIC | Age: 69
End: 2019-09-10

## 2019-09-10 DIAGNOSIS — M54.40 CHRONIC BILATERAL LOW BACK PAIN WITH SCIATICA, SCIATICA LATERALITY UNSPECIFIED: Primary | ICD-10-CM

## 2019-09-10 DIAGNOSIS — G89.29 CHRONIC BILATERAL LOW BACK PAIN WITH SCIATICA, SCIATICA LATERALITY UNSPECIFIED: Primary | ICD-10-CM

## 2019-09-11 ENCOUNTER — CLINICAL SUPPORT (OUTPATIENT)
Dept: REHABILITATION | Facility: HOSPITAL | Age: 69
End: 2019-09-11
Attending: NURSE PRACTITIONER
Payer: MEDICARE

## 2019-09-11 DIAGNOSIS — M54.41 ACUTE BILATERAL LOW BACK PAIN WITH BILATERAL SCIATICA: ICD-10-CM

## 2019-09-11 DIAGNOSIS — R53.1 DECREASED STRENGTH, ENDURANCE, AND MOBILITY: ICD-10-CM

## 2019-09-11 DIAGNOSIS — M54.42 ACUTE BILATERAL LOW BACK PAIN WITH BILATERAL SCIATICA: ICD-10-CM

## 2019-09-11 DIAGNOSIS — Z74.09 DECREASED STRENGTH, ENDURANCE, AND MOBILITY: ICD-10-CM

## 2019-09-11 DIAGNOSIS — M25.60 DECREASED RANGE OF MOTION: ICD-10-CM

## 2019-09-11 DIAGNOSIS — R68.89 DECREASED STRENGTH, ENDURANCE, AND MOBILITY: ICD-10-CM

## 2019-09-11 PROCEDURE — 97110 THERAPEUTIC EXERCISES: CPT | Mod: PN

## 2019-09-11 NOTE — PROGRESS NOTES
"  Physical Therapy Daily Treatment Note     Name: Luciano Henriquez Jr.    Clinic Number: [id number]    Therapy Diagnosis:   Encounter Diagnoses   Name Primary?    Acute bilateral low back pain with bilateral sciatica     Decreased range of motion     Decreased strength, endurance, and mobility        Physician: Juan Arteaga NP    Visit Date: 9/11/2019    Physician Orders: PT Eval and Treat  Medical Diagnosis from Referral: M54.41,M54.42,G89.29 (ICD-10-CM) - Chronic midline low back pain with bilateral sciatica  Evaluation Date: 7/26/2019  Authorization Period Expiration: 12/31/2019  Plan of Care Certification Period: 7/26/2019 to 10/4/2019 (PN due by 9/26/2019)  Visit #/Visits authorized: 14 / 20 ([facility] visit 7)    Time In: 11:05am  Time Out: 12:15pm  Total Treatment Time: 70 minutes  Total Billable Time: 60 minutes    Precautions: Standard    Subjective     Pt reports: increased soreness for 2 days after previous session, woke up today with less pain than usual. Continues to manage pain without use of pain patch  He was compliant with home exercise program.  Response to previous treatment: good, appropriate muscle response  Functional change: less pain    Pain: 3/10  Location: bilateral back      Objective     Luciano received therapeutic exercises to develop strength, endurance, ROM, flexibility, posture and core stabilization for 60 minutes including:    Treadmill for 10 minutes at 2.0mph    DKTC on SB x 20   LTR on SB x 20  Supine HS stretch /c strap: 2 x 30" hold ea.  Sit<>stand from 18inch platform x 10    [facility] 9/10/2019   Visit Number 7   VAS Pain Rating 3   Treadmill Time (in min.) 10   Speed 2   Incline 0   Flexion in Lying 20   Manual Therapy 0   Lumbar Weight 57   Repetitions 20   Rating of Perceived Exertion 3   Ice - Z Lie (in min.) 10     Peripheral muscle strengthening which included 1 set of 15-20 repetitions at a slow, controlled 10-13 second per rep pace focused on " strengthening supporting musculature for improved body mechanics and functional mobility.  Pt and therapist focused on proper form during treatment to ensure optimal strengthening of each targeted muscle group.  Machines were utilized including torso rotation, leg extension, leg curl, chest press, upright row, tricep extension, bicep curl, leg press, hip add, and hip abduction.    Luciano received the following manual therapy techniques: none    Home Exercises Provided and Patient Education Provided     Education provided:   - continued compliance with HEP    Written Home Exercises Provided: Patient instructed to cont prior HEP.  Exercises were reviewed and Luciano was able to demonstrate them prior to the end of the session.  Luciano demonstrated good  understanding of the education provided.     See EMR under Patient Instructions for exercises provided prior visit.    Assessment     Patient tolerated treatment session well with no adverse reaction. Continues to report difficulty ambulating without pain, stepping onto curbs, and bending forward. Continued weight on MedX Lumbar Extension machine to 57 ft/lbs due to feedback after previous session. Patient able to complete 20 repetitions reporting an RPE of 3. Spoke with pt about delayed onset of muscle soreness and differences between soreness and pain similar to when pt started treatment. Appropriate muscle response with global strengthening. Continue progressing patient per [facility] protocol.     Luciano is progressing well towards his goals.   Pt prognosis is Good.     Pt will continue to benefit from skilled outpatient physical therapy to address the deficits listed in the problem list box on initial evaluation, provide pt/family education and to maximize pt's level of independence in the home and community environment.     Pt's spiritual, cultural and educational needs considered and pt agreeable to plan of care and goals.    Anticipated barriers to physical  therapy: chronic pain    Goals: Short Term Goals:  5 weeks  1. Report decreased in pain at worse less than  <   / =  4  /10  to increase tolerance for functional activities. Ongoing 5/10  2. Pt to increase B popliteal angle by greater than > or = 5 degrees in order to improve flexibility and posture. Met 8/27/19  3. Increased BLE MMT 1/2  to increase tolerance for ADL and work activities. Met 8/27/19  4. Pt to tolerate HEP to improve ROM and independence with ADL's. Met 8/23/19  5. Pt to improve range of motion by 25% to allow for improved functional mobility to allow for improvement in IADLs.  Met 8/27/19    Long Term Goals: 10 weeks  1. Report decreased in pain at worse less than  <   / =  2  /10  to increase tolerance for functional mobility.   2 .Pt to increase B popliteal angle by greater than > or = 10 degrees in order to improve flexibility and posture.   3. Increased BLE MMT 1 grade to increase tolerance for ADL and work activities.  4. Pt will report at CJ% or less limitation on FOTO Lumbar  to demonstrate decrease in disability and improvement in back pain.  5. Pt to be Independent with HEP to improve ROM and independence with ADL's  6. Pt to demonstrate negative Bridge Test in order to show improved core strength for lumbar stabilization.   7. Pt to improve range of motion by 75% to allow for improved functional mobility to allow for improvement in IADLs.   8. Pt will report ability to ambulate 20 minutes without increased lumbar pain.   9. Pt will improve MedX testing score to improve by 20 percent to demonstrate improved motor control and strength.     Plan     Assess tolerance to treatment session and progress per POC.     Stacie Sharma, PT

## 2019-09-13 ENCOUNTER — OFFICE VISIT (OUTPATIENT)
Dept: PAIN MEDICINE | Facility: CLINIC | Age: 69
End: 2019-09-13
Payer: MEDICARE

## 2019-09-13 VITALS
HEIGHT: 65 IN | HEART RATE: 80 BPM | BODY MASS INDEX: 33.46 KG/M2 | SYSTOLIC BLOOD PRESSURE: 131 MMHG | DIASTOLIC BLOOD PRESSURE: 66 MMHG | WEIGHT: 200.81 LBS | OXYGEN SATURATION: 96 %

## 2019-09-13 DIAGNOSIS — M47.816 LUMBAR SPONDYLOSIS: ICD-10-CM

## 2019-09-13 DIAGNOSIS — M51.36 DDD (DEGENERATIVE DISC DISEASE), LUMBAR: Primary | ICD-10-CM

## 2019-09-13 DIAGNOSIS — M48.062 SPINAL STENOSIS OF LUMBAR REGION WITH NEUROGENIC CLAUDICATION: ICD-10-CM

## 2019-09-13 PROCEDURE — 99213 OFFICE O/P EST LOW 20 MIN: CPT | Mod: PBBFAC,PN | Performed by: PAIN MEDICINE

## 2019-09-13 PROCEDURE — 99204 OFFICE O/P NEW MOD 45 MIN: CPT | Mod: S$PBB,,, | Performed by: PAIN MEDICINE

## 2019-09-13 PROCEDURE — 99204 PR OFFICE/OUTPT VISIT, NEW, LEVL IV, 45-59 MIN: ICD-10-PCS | Mod: S$PBB,,, | Performed by: PAIN MEDICINE

## 2019-09-13 PROCEDURE — 99999 PR PBB SHADOW E&M-EST. PATIENT-LVL III: ICD-10-PCS | Mod: PBBFAC,,, | Performed by: PAIN MEDICINE

## 2019-09-13 PROCEDURE — 99999 PR PBB SHADOW E&M-EST. PATIENT-LVL III: CPT | Mod: PBBFAC,,, | Performed by: PAIN MEDICINE

## 2019-09-13 RX ORDER — TIZANIDINE 4 MG/1
4 TABLET ORAL EVERY 8 HOURS PRN
Refills: 1 | COMMUNITY
Start: 2019-09-07 | End: 2019-09-18 | Stop reason: SDUPTHER

## 2019-09-13 NOTE — PROGRESS NOTES
Subjective:     Patient ID: Luciano Henriquez Jr. is a 69 y.o. male    Chief Complaint: Low-back Pain (pt takes medication and currently is in PT . Injection the past for knee pain )      Referred by: Self, Aaareferral      HPI:    Initial Encounter (9/13/19):  Luciano Henriquez Jr. is a 69 y.o. male who presents today with chronic bilateral low back pain. Onset of pain was gradual without any specific inciting events or injuries.  Patient localizes the pain across the lower lumbar region.  The pain does radiate to the bilateral buttocks and thighs.  He denies any associated numbness, tingling, weakness, bowel bladder dysfunction.  The pain is intermittent and worsened with activity.  Patient states that he can walk 50-60 yd before the pain becomes bothersome.  The pain typically improves after sitting for 1-2 minutes.   This pain is described in detail below.    Physical Therapy:  Yes.  Somewhat helpful.    Non-pharmacologic Treatment:  Rest helps         · TENS?  No    Pain Medications:         · Currently taking:  Ibuprofen, tizanidine    · Has tried in the past:      · Has not tried:  Opioids, , Tylenol, TCAs, SNRIs, anticonvulsants, topical creams    Blood thinners:  Aspirin    Interventional Therapies:  None    Relevant Surgeries:  None    Affecting sleep?  No    Affecting daily activities? yes    Depressive symptoms? no          · SI/HI? No    Work status: Retried    Pain Scores:    Best:       2/10  Worst:    8/10  Usually:   4/10  Today:  4/10    Review of Systems   Constitutional: Negative for activity change, appetite change, chills, fatigue, fever and unexpected weight change.   HENT: Negative for hearing loss.    Eyes: Negative for visual disturbance.   Respiratory: Negative for chest tightness and shortness of breath.    Cardiovascular: Negative for chest pain.   Gastrointestinal: Negative for abdominal pain, constipation, diarrhea, nausea and vomiting.   Genitourinary: Negative for difficulty  urinating.   Musculoskeletal: Positive for arthralgias, back pain, gait problem and myalgias. Negative for neck pain.   Skin: Negative for rash.   Neurological: Negative for dizziness, weakness, light-headedness, numbness and headaches.   Psychiatric/Behavioral: Negative for hallucinations, sleep disturbance and suicidal ideas. The patient is not nervous/anxious.        Past Medical History:   Diagnosis Date    Allergy     Hyperlipidemia     Hypertension     Reflux esophagitis        Past Surgical History:   Procedure Laterality Date    arthroscopic on left knee      CARPAL TUNNEL RELEASE      right hand only    deviated septum repair      HERNIA REPAIR Left     inguinal    REPAIR-HERNIA-LAPAROSCOPIC-INGUINAL Left 9/7/2016    Performed by [facility]app [facility] at [facility]    VASECTOMY         Social History     Socioeconomic History    Marital status:      Spouse name: Not on file    Number of children: Not on file    Years of education: Not on file    Highest education level: Not on file   Occupational History    Not on file   Social Needs    Financial resource strain: Not on file    Food insecurity:     Worry: Not on file     Inability: Not on file    Transportation needs:     Medical: Not on file     Non-medical: Not on file   Tobacco Use    Smoking status: Never Smoker    Smokeless tobacco: Never Used    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     Substance and Sexual Activity    Alcohol use: Yes     Alcohol/week: 8.4 oz     Types: 14 Glasses of wine per week     Comment: Drinks 2 glasses of wine per night    Drug use: No    Sexual activity: Yes     Partners: Female   Lifestyle    Physical activity:     Days per week: Not on file     Minutes per session: Not on file    Stress: Not on file   Relationships    Social connections:     Talks on phone: Not on file     Gets together: Not on file     Attends Synagogue service: Not on file     Active member of  "club or organization: Not on file     Attends meetings of clubs or organizations: Not on file     Relationship status: Not on file   Other Topics Concern    Not on file   Social History Narrative    Not on file       Review of patient's allergies indicates:  No Known Allergies    Current Outpatient Medications on File Prior to Visit   Medication Sig Dispense Refill    albuterol (VENTOLIN HFA) 90 mcg/actuation inhaler Inhale 2 puffs into the lungs every 6 (six) hours as needed for Wheezing or Shortness of Breath. Rescue 18 g 0    amitriptyline (ELAVIL) 100 MG tablet       amLODIPine (NORVASC) 10 MG tablet Take 1 tablet (10 mg total) by mouth once daily. 90 tablet 3    ascorbic acid (VITAMIN C) 500 MG tablet Take 500 mg by mouth 2 (two) times daily.      aspirin (ECOTRIN) 81 MG EC tablet Take 81 mg by mouth once daily.      azelastine (ASTELIN) 137 mcg (0.1 %) nasal spray 1 spray (137 mcg total) by Nasal route 2 (two) times daily. 30 mL 1    BOOSTRIX TDAP 2.5-8-5 Lf-mcg-Lf/0.5mL Syrg injection       chlorthalidone (HYGROTEN) 25 MG Tab Take 1 tablet (25 mg total) by mouth once daily. 90 tablet 3    doxepin (ZONALON) 5 % cream       fish oil-omega-3 fatty acids 300-1,000 mg capsule Take 2 g by mouth once daily.      multivitamin capsule Take 1 capsule by mouth once daily.      olmesartan (BENICAR) 40 MG tablet Take 1 tablet (40 mg total) by mouth once daily. 90 tablet 3    rosuvastatin (CRESTOR) 10 MG tablet Take 1 tablet (10 mg total) by mouth every evening. 90 tablet 3    sucralfate (CARAFATE) 1 gram tablet Take 1 tablet (1 g total) by mouth 4 (four) times daily before meals and nightly. 360 tablet 2    tiZANidine (ZANAFLEX) 4 MG tablet Take 4 mg by mouth every 8 (eight) hours as needed.  1     No current facility-administered medications on file prior to visit.        Objective:      /66   Pulse 80   Ht 5' 5" (1.651 m)   Wt 91.1 kg (200 lb 12.8 oz)   SpO2 96%   BMI 33.41 kg/m² "     Exam:  GEN:  Well developed, well nourished.  No acute distress.  Normal pain behavior.  HEENT:  No trauma.  Mucous membranes moist.  Nares patent bilaterally.  PSYCH: Normal affect. Thought content appropriate.  CHEST:  Breathing symmetric.  No audible wheezing.  ABD: Soft, non-distended.  SKIN:  Warm, pink, dry.  No rash on exposed areas.    EXT:  No cyanosis, clubbing, or edema.  No color change or changes in nail or hair growth.  NEURO/MUSCULOSKELETAL:  Fully alert, oriented, and appropriate. Speech normal yair. No cranial nerve deficits.   Gait:  Normal.  No trendelenburg sign bilaterally.   Motor Strength: 5/5 motor strength throughout lower extremities.   Sensory:  No sensory deficit in the lower extremities.   Reflexes:  2 + and symmetric throughout.  Downgoing Babinski's bilaterally.  No clonus or spasticity.  L-Spine:  Full ROM without pain on flexion or extension. Negative pain with axial/facet loading bilaterally.  Negative SLR bilaterally.  Positive Milgram's test  SI Joint/Hip:  Negative JEROME bilaterally.  Negative FADIR bilaterally.  No TTP over lumbar paraspinals, bilateral SI joints, hips, piriformis muscles, or GTB.          Imaging:  Narrative     EXAMINATION:  XR LUMBAR SPINE AP AND LATERAL    CLINICAL HISTORY:  Low back pain, >6wks conservative tx, persistent-progressive sx, surgical candidate;  Low back pain    TECHNIQUE:  3 views of the lumbar spine    COMPARISON:  None    FINDINGS:  Alignment: Straightening of the normal lumbar lordosis without significant listhesis.    Vertebrae: Vertebral body heights are maintained.  Posterior elements are intact.  Pedicles/spinous processes are well delineated.  No acute displaced fracture, dislocation or suspicious appearing lytic/blastic lesions.    Discs and facets: Moderate disc height loss at L2-3 and L4-5 with more mild disc height loss noted throughout the remainder of the lumbar spine associated with reactive osteophytosis.  Marked osseous  facet arthrosis at L4-5 and L5-S1 associated with marked osseous neural foraminal narrowing at these levels.  Remainder of the facet joints are unremarkable without evidence of advanced osseous arthrosis contributing to severe osseous neuroforaminal narrowing.   Impression       1. Mild to moderate multilevel degenerative disc disease throughout the lumbar spine most prominent at L4-5 and L5-S1 where marked osseous facet arthrosis contributes to significant osseous neural foraminal narrowing.  2. Straightening of the normal lumbar lordosis which may reflect muscle spasm versus patient positioning.      Electronically signed by: Chance Flor  Date: 07/31/2019  Time: 11:20         Assessment:       Encounter Diagnoses   Name Primary?    DDD (degenerative disc disease), lumbar Yes    Lumbar spondylosis     Spinal stenosis of lumbar region with neurogenic claudication          Plan:       Luciano was seen today for low-back pain.    Diagnoses and all orders for this visit:    DDD (degenerative disc disease), lumbar  -     MRI Lumbar Spine Without Contrast; Future  -     MRI Lumbar Spine Without Contrast    Lumbar spondylosis  -     MRI Lumbar Spine Without Contrast; Future  -     MRI Lumbar Spine Without Contrast    Spinal stenosis of lumbar region with neurogenic claudication  -     MRI Lumbar Spine Without Contrast; Future  -     MRI Lumbar Spine Without Contrast        Luciano Santos Martinez Gonzalez. is a 69 y.o. male with chronic bilateral low back pain that radiates the bilateral lower extremities.  Exact etiology of pain of unknown though I do have suspicion for neurogenic claudication/spinal stenosis.  Low suspicion for facet or sacroiliac mediated pain. Low suspicion for radiculopathy.  Disc space narrowing noted on lumbar x-rays with significant lower lumbar facet arthritis.    1.  Pertinent imaging studies reviewed by me. Imaging results were discussed with patient.  2.  Continue physical therapy/home exercise  program.  3.  Lumbar MRI to evaluate for spinal stenosis.  4.  Return to clinic after MRI to discuss results.  May consider epidural steroid injection if appropriate.

## 2019-09-18 ENCOUNTER — CLINICAL SUPPORT (OUTPATIENT)
Dept: REHABILITATION | Facility: HOSPITAL | Age: 69
End: 2019-09-18
Attending: NURSE PRACTITIONER
Payer: MEDICARE

## 2019-09-18 DIAGNOSIS — Z74.09 DECREASED STRENGTH, ENDURANCE, AND MOBILITY: ICD-10-CM

## 2019-09-18 DIAGNOSIS — R68.89 DECREASED STRENGTH, ENDURANCE, AND MOBILITY: ICD-10-CM

## 2019-09-18 DIAGNOSIS — M54.42 ACUTE BILATERAL LOW BACK PAIN WITH BILATERAL SCIATICA: ICD-10-CM

## 2019-09-18 DIAGNOSIS — M54.41 ACUTE BILATERAL LOW BACK PAIN WITH BILATERAL SCIATICA: ICD-10-CM

## 2019-09-18 DIAGNOSIS — M25.60 DECREASED RANGE OF MOTION: ICD-10-CM

## 2019-09-18 DIAGNOSIS — R53.1 DECREASED STRENGTH, ENDURANCE, AND MOBILITY: ICD-10-CM

## 2019-09-18 PROCEDURE — 97750 PHYSICAL PERFORMANCE TEST: CPT | Mod: PN

## 2019-09-18 PROCEDURE — 97110 THERAPEUTIC EXERCISES: CPT | Mod: PN

## 2019-09-18 RX ORDER — TIZANIDINE 4 MG/1
4 TABLET ORAL EVERY 8 HOURS PRN
Qty: 30 TABLET | Refills: 1 | Status: SHIPPED | OUTPATIENT
Start: 2019-09-18 | End: 2019-10-21 | Stop reason: SDUPTHER

## 2019-09-18 NOTE — TELEPHONE ENCOUNTER
----- Message from Peg Gallagher sent at 9/18/2019  2:54 PM CDT -----  Contact: Patient ph  [phone]  Type: RX Refill Request    Who Called: Patient    Refill or New Rx: Refill    RX Name and Strength: tiZANidine (ZANAFLEX) 4 MG tablet    Is this a 30 day or 90 day RX: 90 day    Preferred Pharmacy with phone number: .  [id number]  [place] - [address]  [address]  [place] [address]  Phone: [phone] Fax: [phone]    Local or Mail Order: Local    Would the patient rather a call back or a response via My [facility]? Call back    Best Call Back Number: [phone]

## 2019-09-18 NOTE — PROGRESS NOTES
"  Physical Therapy Daily Treatment Note     Name: Luciano Henriquez Jr.    Clinic Number: [id number]    Therapy Diagnosis:   Encounter Diagnoses   Name Primary?    Acute bilateral low back pain with bilateral sciatica     Decreased range of motion     Decreased strength, endurance, and mobility        Physician: Juan Arteaga NP    Visit Date: 9/18/2019    Physician Orders: PT Eval and Treat  Medical Diagnosis from Referral: M54.41,M54.42,G89.29 (ICD-10-CM) - Chronic midline low back pain with bilateral sciatica  Evaluation Date: 7/26/2019  Authorization Period Expiration: 12/31/2019  Plan of Care Certification Period: 7/26/2019 to 10/4/2019 (PN due by 9/26/2019)  Visit #/Visits authorized: 15 / 20 ([facility]y Back visit 7)    Time In: 11:01am  Time Out: 12:07pm  Total Treatment Time: 65 minutes  Total Billable Time: 55 minutes    Precautions: Standard    Subjective     Pt reports: went to MD and is scheduled for MRI on Tuesday 9/24/19.   He was compliant with home exercise program.  Response to previous treatment: good, appropriate muscle response  Functional change: less pain    Pain: 3/10  Location: bilateral back      Objective     Luciano received therapeutic exercises to develop strength, endurance, ROM, flexibility, posture and core stabilization for 60 minutes including:    Tested 9/18/19  Lumbar Flexion 60   Lumbar Extension 0   Lumbar Peak Torque 151   Min Torque 45   Test Percent Below Normative Data 22       Treadmill for 10 minutes at 2.0mph    DKTC on SB x 20   LTR on SB x 20-NP  Supine HS stretch /c strap: 2 x 30" hold ea.-NP  Sit<>stand from 18inch platform x 10-NP    [facility] Therapy 9/18/2019   Visit Number 8   VAS Pain Rating 3   Treadmill Time (in min.) 10   Speed 2   Incline 0   Flexion in Lying 20   Manual Therapy 0   Lumbar Weight 57-NP due to testing   Repetitions 20-NP due to testing   Rating of Perceived Exertion 3   Ice - Z Lie (in min.) 10     Peripheral muscle strengthening which " included 1 set of 15-20 repetitions at a slow, controlled 10-13 second per rep pace focused on strengthening supporting musculature for improved body mechanics and functional mobility.  Pt and therapist focused on proper form during treatment to ensure optimal strengthening of each targeted muscle group.  Machines were utilized including torso rotation, leg extension, leg curl, chest press, upright row, tricep extension, bicep curl, leg press, hip add, and hip abduction.    Luciano received the following manual therapy techniques: none    Home Exercises Provided and Patient Education Provided     Education provided:   - continued compliance with HEP    Written Home Exercises Provided: Patient instructed to cont prior HEP.  Exercises were reviewed and Luciano was able to demonstrate them prior to the end of the session.  Luciano demonstrated good  understanding of the education provided.     See EMR under Patient Instructions for exercises provided prior visit.    Assessment     Patient tolerated treatment session well with no adverse reaction. Performed MedX isometric testing today with improved max torque to 151 and min torque at 45. Pt scored an average of 22% below normative data compared to 56% below initially. Appropriate muscle response with global strengthening. Continue progressing patient per Healthy Back protocol.     Luciano is progressing well towards his goals.   Pt prognosis is Good.     Pt will continue to benefit from skilled outpatient physical therapy to address the deficits listed in the problem list box on initial evaluation, provide pt/family education and to maximize pt's level of independence in the home and community environment.     Pt's spiritual, cultural and educational needs considered and pt agreeable to plan of care and goals.    Anticipated barriers to physical therapy: chronic pain    Goals: Short Term Goals:  5 weeks  1. Report decreased in pain at worse less than  <   / =  4  /10  to  increase tolerance for functional activities. Ongoing 5/10  2. Pt to increase B popliteal angle by greater than > or = 5 degrees in order to improve flexibility and posture. Met 8/27/19  3. Increased BLE MMT 1/2  to increase tolerance for ADL and work activities. Met 8/27/19  4. Pt to tolerate HEP to improve ROM and independence with ADL's. Met 8/23/19  5. Pt to improve range of motion by 25% to allow for improved functional mobility to allow for improvement in IADLs.  Met 8/27/19    Long Term Goals: 10 weeks  1. Report decreased in pain at worse less than  <   / =  2  /10  to increase tolerance for functional mobility.   2 .Pt to increase B popliteal angle by greater than > or = 10 degrees in order to improve flexibility and posture.   3. Increased BLE MMT 1 grade to increase tolerance for ADL and work activities.  4. Pt will report at CJ% or less limitation on FOTO Lumbar  to demonstrate decrease in disability and improvement in back pain.  5. Pt to be Independent with HEP to improve ROM and independence with ADL's  6. Pt to demonstrate negative Bridge Test in order to show improved core strength for lumbar stabilization.   7. Pt to improve range of motion by 75% to allow for improved functional mobility to allow for improvement in IADLs.   8. Pt will report ability to ambulate 20 minutes without increased lumbar pain.   9. Pt will improve MedX testing score to improve by 20 percent to demonstrate improved motor control and strength.     Plan     Assess tolerance to treatment session and progress per POC.     Stacie Sharma, PT

## 2019-09-19 ENCOUNTER — DOCUMENTATION ONLY (OUTPATIENT)
Dept: PAIN MEDICINE | Facility: CLINIC | Age: 69
End: 2019-09-19

## 2019-09-19 ENCOUNTER — PATIENT MESSAGE (OUTPATIENT)
Dept: PAIN MEDICINE | Facility: CLINIC | Age: 69
End: 2019-09-19

## 2019-09-19 NOTE — PROGRESS NOTES
Email confirmation received on Monday 9/16/19 at 1:41pm that MRI order was successfully faxed to [facility].

## 2019-09-20 ENCOUNTER — CLINICAL SUPPORT (OUTPATIENT)
Dept: REHABILITATION | Facility: HOSPITAL | Age: 69
End: 2019-09-20
Attending: NURSE PRACTITIONER
Payer: MEDICARE

## 2019-09-20 DIAGNOSIS — M25.60 DECREASED RANGE OF MOTION: ICD-10-CM

## 2019-09-20 DIAGNOSIS — M54.41 ACUTE BILATERAL LOW BACK PAIN WITH BILATERAL SCIATICA: ICD-10-CM

## 2019-09-20 DIAGNOSIS — R53.1 DECREASED STRENGTH, ENDURANCE, AND MOBILITY: ICD-10-CM

## 2019-09-20 DIAGNOSIS — Z74.09 DECREASED STRENGTH, ENDURANCE, AND MOBILITY: ICD-10-CM

## 2019-09-20 DIAGNOSIS — M54.42 ACUTE BILATERAL LOW BACK PAIN WITH BILATERAL SCIATICA: ICD-10-CM

## 2019-09-20 DIAGNOSIS — R68.89 DECREASED STRENGTH, ENDURANCE, AND MOBILITY: ICD-10-CM

## 2019-09-20 PROCEDURE — 97110 THERAPEUTIC EXERCISES: CPT | Mod: PN

## 2019-09-20 NOTE — PROGRESS NOTES
"  Physical Therapy Daily Treatment Note     Name: Luciano Henriquez Jr.    Clinic Number: [id number]    Therapy Diagnosis:   Encounter Diagnoses   Name Primary?    Acute bilateral low back pain with bilateral sciatica     Decreased range of motion     Decreased strength, endurance, and mobility        Physician: Juan Arteaga NP    Visit Date: 9/20/2019    Physician Orders: PT Eval and Treat  Medical Diagnosis from Referral: M54.41,M54.42,G89.29 (ICD-10-CM) - Chronic midline low back pain with bilateral sciatica  Evaluation Date: 7/26/2019  Authorization Period Expiration: 12/31/2019  Plan of Care Certification Period: 7/26/2019 to 10/4/2019 (PN due by 9/26/2019)  Visit #/Visits authorized: 16 / 20 (Healthy Back visit 9)    Time In: 10:00am  Time Out: 11:00am  Total Treatment Time: 60 minutes  Total Billable Time: 60 minutes    Precautions: Standard    Subjective     Pt reports: feels fine today  He was compliant with home exercise program.  Response to previous treatment: good, appropriate muscle response  Functional change: less pain    Pain: 3/10  Location: bilateral back      Objective     Luciano received therapeutic exercises to develop strength, endurance, ROM, flexibility, posture and core stabilization for 60 minutes including:    Tested 9/18/19  Lumbar Flexion 60   Lumbar Extension 0   Lumbar Peak Torque 151   Min Torque 45   Test Percent Below Normative Data 22       Treadmill for 10 minutes at 2.0mph    DKTC on SB x 20   LTR on SB x 20-NP  Supine HS stretch /c strap: 2 x 30" hold ea.-NP  Sit<>stand from 18inch platform x 10-NP    [facility] Therapy 9/20/2019   Visit Number 9   VAS Pain Rating 3   Treadmill Time (in min.) 10   Speed 2   Incline 0   Flexion in Lying 20   Manual Therapy 0   Lumbar Weight 65   Repetitions 20   Rating of Perceived Exertion 4   Ice - Z Lie (in min.) 10     Peripheral muscle strengthening which included 1 set of 15-20 repetitions at a slow, controlled 10-13 second per rep " pace focused on strengthening supporting musculature for improved body mechanics and functional mobility.  Pt and therapist focused on proper form during treatment to ensure optimal strengthening of each targeted muscle group.  Machines were utilized including torso rotation, leg extension, leg curl, chest press, upright row, tricep extension, bicep curl, leg press, hip add, and hip abduction.    Luciano received the following manual therapy techniques: none    Home Exercises Provided and Patient Education Provided     Education provided:   - continued compliance with HEP    Written Home Exercises Provided: Patient instructed to cont prior HEP.  Exercises were reviewed and Luciano was able to demonstrate them prior to the end of the session.  Luciano demonstrated good  understanding of the education provided.     See EMR under Patient Instructions for exercises provided prior visit.    Assessment     Patient tolerated treatment session well with no adverse reaction. Performed [facility] Lumbar Extension machine to 65 ft/lbs for 20 repetitions today, RPE of 4.  Appropriate muscle response with global strengthening. Continue progressing patient per Healthy Back protocol.     Luciano is progressing well towards his goals.   Pt prognosis is Good.     Pt will continue to benefit from skilled outpatient physical therapy to address the deficits listed in the problem list box on initial evaluation, provide pt/family education and to maximize pt's level of independence in the home and community environment.     Pt's spiritual, cultural and educational needs considered and pt agreeable to plan of care and goals.    Anticipated barriers to physical therapy: chronic pain    Goals: Short Term Goals:  5 weeks  1. Report decreased in pain at worse less than  <   / =  4  /10  to increase tolerance for functional activities. Ongoing 5/10  2. Pt to increase B popliteal angle by greater than > or = 5 degrees in order to improve flexibility and  posture. Met 8/27/19  3. Increased BLE MMT 1/2  to increase tolerance for ADL and work activities. Met 8/27/19  4. Pt to tolerate HEP to improve ROM and independence with ADL's. Met 8/23/19  5. Pt to improve range of motion by 25% to allow for improved functional mobility to allow for improvement in IADLs.  Met 8/27/19    Long Term Goals: 10 weeks  1. Report decreased in pain at worse less than  <   / =  2  /10  to increase tolerance for functional mobility.   2 .Pt to increase B popliteal angle by greater than > or = 10 degrees in order to improve flexibility and posture.   3. Increased BLE MMT 1 grade to increase tolerance for ADL and work activities.  4. Pt will report at CJ% or less limitation on FOTO Lumbar  to demonstrate decrease in disability and improvement in back pain.  5. Pt to be Independent with HEP to improve ROM and independence with ADL's  6. Pt to demonstrate negative Bridge Test in order to show improved core strength for lumbar stabilization.   7. Pt to improve range of motion by 75% to allow for improved functional mobility to allow for improvement in IADLs.   8. Pt will report ability to ambulate 20 minutes without increased lumbar pain.   9. Pt will improve MedX testing score to improve by 20 percent to demonstrate improved motor control and strength.     Plan     Assess tolerance to treatment session and progress per POC.     Stacie Sharma, PT

## 2019-09-24 ENCOUNTER — CLINICAL SUPPORT (OUTPATIENT)
Dept: REHABILITATION | Facility: HOSPITAL | Age: 69
End: 2019-09-24
Attending: NURSE PRACTITIONER
Payer: MEDICARE

## 2019-09-24 DIAGNOSIS — R53.1 DECREASED STRENGTH, ENDURANCE, AND MOBILITY: ICD-10-CM

## 2019-09-24 DIAGNOSIS — Z74.09 DECREASED STRENGTH, ENDURANCE, AND MOBILITY: ICD-10-CM

## 2019-09-24 DIAGNOSIS — M54.41 ACUTE BILATERAL LOW BACK PAIN WITH BILATERAL SCIATICA: ICD-10-CM

## 2019-09-24 DIAGNOSIS — M25.60 DECREASED RANGE OF MOTION: ICD-10-CM

## 2019-09-24 DIAGNOSIS — M54.42 ACUTE BILATERAL LOW BACK PAIN WITH BILATERAL SCIATICA: ICD-10-CM

## 2019-09-24 DIAGNOSIS — R68.89 DECREASED STRENGTH, ENDURANCE, AND MOBILITY: ICD-10-CM

## 2019-09-24 PROCEDURE — 97110 THERAPEUTIC EXERCISES: CPT | Mod: PN

## 2019-09-24 NOTE — PROGRESS NOTES
"  Physical Therapy Daily Treatment Note     Name: Luciano Henriquez Jr.    Clinic Number: [id number]    Therapy Diagnosis:   Encounter Diagnoses   Name Primary?    Acute bilateral low back pain with bilateral sciatica     Decreased range of motion     Decreased strength, endurance, and mobility        Physician: Juan Arteaga NP    Visit Date: 9/24/2019    Physician Orders: PT Eval and Treat  Medical Diagnosis from Referral: M54.41,M54.42,G89.29 (ICD-10-CM) - Chronic midline low back pain with bilateral sciatica  Evaluation Date: 7/26/2019  Authorization Period Expiration: 12/31/2019  Plan of Care Certification Period: 7/26/2019 to 10/4/2019 (PN due by 9/26/2019)  Visit #/Visits authorized: 17 / 20 ([facility] visit 10)    Time In: 10:00am  Time Out: 11:00am  Total Treatment Time: 60 minutes  Total Billable Time: 30 minutes    Precautions: Standard    Subjective     Pt reports: felt fine after last session  He was compliant with home exercise program.  Response to previous treatment: good, appropriate muscle response  Functional change: less pain    Pain: 4/10  Location: bilateral back      Objective     Luciano received therapeutic exercises to develop strength, endurance, ROM, flexibility, posture and core stabilization for 60 minutes including:    Tested 9/18/19  Lumbar Flexion 60   Lumbar Extension 0   Lumbar Peak Torque 151   Min Torque 45   Test Percent Below Normative Data 22       Treadmill for 10 minutes at 2.0mph    DKTC on SB x 20   LTR on SB x 20-NP  Supine HS stretch /c strap: 2 x 30" hold ea.-NP  Sit<>stand from 18inch platform x 10-NP     [facility] Therapy 9/24/2019   Visit Number 10   VAS Pain Rating 4   Treadmill Time (in min.) 10   Speed 2   Incline 0   Recumbent Bike Seat Pos. -   Time -   Flexion in Lying 20   Manual Therapy 0   Lumbar Extension Seat Pad 0   Femur Restraint 5   Top Dead Center 24   Counterweight 140   Lumbar Flexion 60   Lumbar Extension 0   Lumbar Peak Torque 155   Min " Torque 45   Test Percent Below Normative Data 22   Lumbar Weight 68   Repetitions 20   Rating of Perceived Exertion 4   Ice - Z Lie (in min.) 0         Peripheral muscle strengthening which included 1 set of 15-20 repetitions at a slow, controlled 10-13 second per rep pace focused on strengthening supporting musculature for improved body mechanics and functional mobility.  Pt and therapist focused on proper form during treatment to ensure optimal strengthening of each targeted muscle group.  Machines were utilized including torso rotation, leg extension, leg curl, chest press, upright row, tricep extension, bicep curl, leg press, hip add, and hip abduction.    Luciano received the following manual therapy techniques: none    Home Exercises Provided and Patient Education Provided     Education provided:   - continued compliance with HEP    Written Home Exercises Provided: Patient instructed to cont prior HEP.  Exercises were reviewed and Luciano was able to demonstrate them prior to the end of the session.  Luciano demonstrated good  understanding of the education provided.     See EMR under Patient Instructions for exercises provided prior visit.    Assessment     Patient tolerated treatment session well with no adverse reaction. Performed [employer] Lumbar Extension machine to 68 ft/lbs for 20 repetitions today, RPE of 4.  Appropriate muscle response with global strengthening. Continue progressing patient per [facility] protocol.     Luciano is progressing well towards his goals.   Pt prognosis is Good.     Pt will continue to benefit from skilled outpatient physical therapy to address the deficits listed in the problem list box on initial evaluation, provide pt/family education and to maximize pt's level of independence in the home and community environment.     Pt's spiritual, cultural and educational needs considered and pt agreeable to plan of care and goals.    Anticipated barriers to physical therapy: chronic  pain    Goals: Short Term Goals:  5 weeks  1. Report decreased in pain at worse less than  <   / =  4  /10  to increase tolerance for functional activities. Ongoing 5/10  2. Pt to increase B popliteal angle by greater than > or = 5 degrees in order to improve flexibility and posture. Met 8/27/19  3. Increased BLE MMT 1/2  to increase tolerance for ADL and work activities. Met 8/27/19  4. Pt to tolerate HEP to improve ROM and independence with ADL's. Met 8/23/19  5. Pt to improve range of motion by 25% to allow for improved functional mobility to allow for improvement in IADLs.  Met 8/27/19    Long Term Goals: 10 weeks  1. Report decreased in pain at worse less than  <   / =  2  /10  to increase tolerance for functional mobility.   2 .Pt to increase B popliteal angle by greater than > or = 10 degrees in order to improve flexibility and posture.   3. Increased BLE MMT 1 grade to increase tolerance for ADL and work activities.  4. Pt will report at CJ% or less limitation on FOTO Lumbar  to demonstrate decrease in disability and improvement in back pain.  5. Pt to be Independent with HEP to improve ROM and independence with ADL's  6. Pt to demonstrate negative Bridge Test in order to show improved core strength for lumbar stabilization.   7. Pt to improve range of motion by 75% to allow for improved functional mobility to allow for improvement in IADLs.   8. Pt will report ability to ambulate 20 minutes without increased lumbar pain.   9. Pt will improve MedX testing score to improve by 20 percent to demonstrate improved motor control and strength.     Plan     Assess tolerance to treatment session and progress per POC.     Stacie Sharma, PT

## 2019-09-25 ENCOUNTER — PATIENT OUTREACH (OUTPATIENT)
Dept: ADMINISTRATIVE | Facility: OTHER | Age: 69
End: 2019-09-25

## 2019-09-27 ENCOUNTER — OFFICE VISIT (OUTPATIENT)
Dept: PAIN MEDICINE | Facility: CLINIC | Age: 69
End: 2019-09-27
Payer: MEDICARE

## 2019-09-27 ENCOUNTER — CLINICAL SUPPORT (OUTPATIENT)
Dept: FAMILY MEDICINE | Facility: CLINIC | Age: 69
End: 2019-09-27
Payer: MEDICARE

## 2019-09-27 ENCOUNTER — CLINICAL SUPPORT (OUTPATIENT)
Dept: REHABILITATION | Facility: HOSPITAL | Age: 69
End: 2019-09-27
Attending: NURSE PRACTITIONER
Payer: MEDICARE

## 2019-09-27 VITALS
SYSTOLIC BLOOD PRESSURE: 129 MMHG | HEIGHT: 65 IN | WEIGHT: 201.63 LBS | OXYGEN SATURATION: 96 % | DIASTOLIC BLOOD PRESSURE: 72 MMHG | BODY MASS INDEX: 33.59 KG/M2 | HEART RATE: 84 BPM

## 2019-09-27 DIAGNOSIS — R53.1 DECREASED STRENGTH, ENDURANCE, AND MOBILITY: ICD-10-CM

## 2019-09-27 DIAGNOSIS — M54.41 ACUTE BILATERAL LOW BACK PAIN WITH BILATERAL SCIATICA: ICD-10-CM

## 2019-09-27 DIAGNOSIS — M51.36 DDD (DEGENERATIVE DISC DISEASE), LUMBAR: Primary | ICD-10-CM

## 2019-09-27 DIAGNOSIS — M54.42 ACUTE BILATERAL LOW BACK PAIN WITH BILATERAL SCIATICA: ICD-10-CM

## 2019-09-27 DIAGNOSIS — M47.816 LUMBAR SPONDYLOSIS: ICD-10-CM

## 2019-09-27 DIAGNOSIS — Z74.09 DECREASED STRENGTH, ENDURANCE, AND MOBILITY: ICD-10-CM

## 2019-09-27 DIAGNOSIS — M25.60 DECREASED RANGE OF MOTION: ICD-10-CM

## 2019-09-27 DIAGNOSIS — M48.062 SPINAL STENOSIS OF LUMBAR REGION WITH NEUROGENIC CLAUDICATION: ICD-10-CM

## 2019-09-27 DIAGNOSIS — R68.89 DECREASED STRENGTH, ENDURANCE, AND MOBILITY: ICD-10-CM

## 2019-09-27 DIAGNOSIS — Z23 NEED FOR INFLUENZA VACCINATION: Primary | ICD-10-CM

## 2019-09-27 PROCEDURE — 99999 PR PBB SHADOW E&M-EST. PATIENT-LVL III: ICD-10-PCS | Mod: PBBFAC,,, | Performed by: PAIN MEDICINE

## 2019-09-27 PROCEDURE — 99213 OFFICE O/P EST LOW 20 MIN: CPT | Mod: S$PBB,,, | Performed by: PAIN MEDICINE

## 2019-09-27 PROCEDURE — 99211 OFF/OP EST MAY X REQ PHY/QHP: CPT | Mod: PBBFAC,27,PN

## 2019-09-27 PROCEDURE — 99999 PR PBB SHADOW E&M-EST. PATIENT-LVL I: CPT | Mod: PBBFAC,,,

## 2019-09-27 PROCEDURE — 97110 THERAPEUTIC EXERCISES: CPT | Mod: PN

## 2019-09-27 PROCEDURE — 90662 IIV NO PRSV INCREASED AG IM: CPT | Mod: PBBFAC,PN

## 2019-09-27 PROCEDURE — 99213 OFFICE O/P EST LOW 20 MIN: CPT | Mod: PBBFAC,PN | Performed by: PAIN MEDICINE

## 2019-09-27 PROCEDURE — 99999 PR PBB SHADOW E&M-EST. PATIENT-LVL III: CPT | Mod: PBBFAC,,, | Performed by: PAIN MEDICINE

## 2019-09-27 PROCEDURE — 99499 NO LOS: ICD-10-PCS | Mod: S$PBB,,, | Performed by: FAMILY MEDICINE

## 2019-09-27 PROCEDURE — 99213 PR OFFICE/OUTPT VISIT, EST, LEVL III, 20-29 MIN: ICD-10-PCS | Mod: S$PBB,,, | Performed by: PAIN MEDICINE

## 2019-09-27 PROCEDURE — 99999 PR PBB SHADOW E&M-EST. PATIENT-LVL I: ICD-10-PCS | Mod: PBBFAC,,,

## 2019-09-27 PROCEDURE — 99499 UNLISTED E&M SERVICE: CPT | Mod: S$PBB,,, | Performed by: FAMILY MEDICINE

## 2019-09-27 NOTE — H&P (VIEW-ONLY)
Subjective:     Patient ID: Luciano Henriquez Jr. is a 69 y.o. male    Chief Complaint: Follow-up      Referred by: No ref. provider found      HPI:    Interval History (9/27/19):  He returns today for follow up and MRI review.  He reports that his pain is unchanged in quality location since last encounter.  He denies any new or worsening symptoms.      Initial Encounter (9/13/19):  Luciano Henriquez Jr. is a 69 y.o. male who presents today with chronic bilateral low back pain. Onset of pain was gradual without any specific inciting events or injuries.  Patient localizes the pain across the lower lumbar region.  The pain does radiate to the bilateral buttocks and thighs.  He denies any associated numbness, tingling, weakness, bowel bladder dysfunction.  The pain is intermittent and worsened with activity.  Patient states that he can walk 50-60 yd before the pain becomes bothersome.  The pain typically improves after sitting for 1-2 minutes.   This pain is described in detail below.    Physical Therapy:  Yes.  Somewhat helpful.    Non-pharmacologic Treatment:  Rest helps         · TENS?  No    Pain Medications:         · Currently taking:  Ibuprofen, tizanidine    · Has tried in the past:      · Has not tried:  Opioids, , Tylenol, TCAs, SNRIs, anticonvulsants, topical creams    Blood thinners:  Aspirin    Interventional Therapies:  None    Relevant Surgeries:  None    Affecting sleep?  No    Affecting daily activities? yes    Depressive symptoms? no          · SI/HI? No    Work status: Retried    Pain Scores:    Best:       2/10  Worst:    8/10  Usually:   4/10  Today:  4/10    Review of Systems   Constitutional: Negative for activity change, appetite change, chills, fatigue, fever and unexpected weight change.   HENT: Negative for hearing loss.    Eyes: Negative for visual disturbance.   Respiratory: Negative for chest tightness and shortness of breath.    Cardiovascular: Negative for chest pain.    Gastrointestinal: Negative for abdominal pain, constipation, diarrhea, nausea and vomiting.   Genitourinary: Negative for difficulty urinating.   Musculoskeletal: Positive for arthralgias, back pain, gait problem and myalgias. Negative for neck pain.   Skin: Negative for rash.   Neurological: Negative for dizziness, weakness, light-headedness, numbness and headaches.   Psychiatric/Behavioral: Negative for hallucinations, sleep disturbance and suicidal ideas. The patient is not nervous/anxious.        Past Medical History:   Diagnosis Date    Allergy     Hyperlipidemia     Hypertension     Reflux esophagitis        Past Surgical History:   Procedure Laterality Date    arthroscopic on left knee      CARPAL TUNNEL RELEASE      right hand only    deviated septum repair      HERNIA REPAIR Left     inguinal    VASECTOMY         Social History     Socioeconomic History    Marital status:      Spouse name: Not on file    Number of children: Not on file    Years of education: Not on file    Highest education level: Not on file   Occupational History    Not on file   Social Needs    Financial resource strain: Not on file    Food insecurity:     Worry: Not on file     Inability: Not on file    Transportation needs:     Medical: Not on file     Non-medical: Not on file   Tobacco Use    Smoking status: Never Smoker    Smokeless tobacco: Never Used    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     Substance and Sexual Activity    Alcohol use: Yes     Alcohol/week: 14.0 standard drinks     Types: 14 Glasses of wine per week     Comment: Drinks 2 glasses of wine per night    Drug use: No    Sexual activity: Yes     Partners: Female   Lifestyle    Physical activity:     Days per week: Not on file     Minutes per session: Not on file    Stress: Not on file   Relationships    Social connections:     Talks on phone: Not on file     Gets together: Not on file     Attends  "Yazdanism service: Not on file     Active member of club or organization: Not on file     Attends meetings of clubs or organizations: Not on file     Relationship status: Not on file   Other Topics Concern    Not on file   Social History Narrative    Not on file       Review of patient's allergies indicates:  No Known Allergies    Current Outpatient Medications on File Prior to Visit   Medication Sig Dispense Refill    albuterol (VENTOLIN HFA) 90 mcg/actuation inhaler Inhale 2 puffs into the lungs every 6 (six) hours as needed for Wheezing or Shortness of Breath. Rescue 18 g 0    amitriptyline (ELAVIL) 100 MG tablet       amLODIPine (NORVASC) 10 MG tablet Take 1 tablet (10 mg total) by mouth once daily. 90 tablet 3    ascorbic acid (VITAMIN C) 500 MG tablet Take 500 mg by mouth 2 (two) times daily.      aspirin (ECOTRIN) 81 MG EC tablet Take 81 mg by mouth once daily.      azelastine (ASTELIN) 137 mcg (0.1 %) nasal spray 1 spray (137 mcg total) by Nasal route 2 (two) times daily. 30 mL 1    BOOSTRIX TDAP 2.5-8-5 Lf-mcg-Lf/0.5mL Syrg injection       chlorthalidone (HYGROTEN) 25 MG Tab Take 1 tablet (25 mg total) by mouth once daily. 90 tablet 3    doxepin (ZONALON) 5 % cream       fish oil-omega-3 fatty acids 300-1,000 mg capsule Take 2 g by mouth once daily.      multivitamin capsule Take 1 capsule by mouth once daily.      olmesartan (BENICAR) 40 MG tablet Take 1 tablet (40 mg total) by mouth once daily. 90 tablet 3    rosuvastatin (CRESTOR) 10 MG tablet Take 1 tablet (10 mg total) by mouth every evening. 90 tablet 3    sucralfate (CARAFATE) 1 gram tablet Take 1 tablet (1 g total) by mouth 4 (four) times daily before meals and nightly. 360 tablet 2    tiZANidine (ZANAFLEX) 4 MG tablet Take 1 tablet (4 mg total) by mouth every 8 (eight) hours as needed. 30 tablet 1     No current facility-administered medications on file prior to visit.        Objective:      /72   Pulse 84   Ht 5' 5" (1.651 " m)   Wt 91.4 kg (201 lb 9.6 oz)   SpO2 96%   BMI 33.55 kg/m²     Exam:  GEN:  Well developed, well nourished.  No acute distress.   HEENT:  No trauma.  Mucous membranes moist.  Nares patent bilaterally.  PSYCH: Normal affect. Thought content appropriate.  CHEST:  Breathing symmetric.  No audible wheezing.  ABD: Soft, non-distended.  SKIN:  Warm, pink, dry.  No rash on exposed areas.    EXT:  No cyanosis, clubbing, or edema.  No color change or changes in nail or hair growth.  NEURO/MUSCULOSKELETAL:  Fully alert, oriented, and appropriate. Speech normal yair. No cranial nerve deficits.   Gait:  Normal.  No focal motor deficits.         Imaging:  Outside lumbar MRI done on 9/24/19:    L2-3:  Mild spinal central stenosis secondary to disc bulge and facet and ligamentum flavum hypertrophy.  Right greater than left moderate foraminal narrowing.    L3-4:  Moderate central stenosis secondary to disc bulge and facet and ligamentum flavum hypertrophy.  Moderate right and moderate to severe left foraminal narrowing with probable but min of both exiting L3 nerve roots.    L4-5:  Mild central stenosis secondary to disc bulge and facet and ligamentum flavum hypertrophy.  Moderate to severe bilateral foraminal narrowing with probable abutment of both exiting L4 nerve roots.    L5-S1:  Slight indentation of the thecal sac secondary to disc bulge.  Conceivable impingement of the transitioning S1 nerve roots.  Moderate to severe left> right foraminal stenosis        Assessment:       Encounter Diagnoses   Name Primary?    DDD (degenerative disc disease), lumbar Yes    Spinal stenosis of lumbar region with neurogenic claudication     Lumbar spondylosis          Plan:       Luciano was seen today for follow-up.    Diagnoses and all orders for this visit:    DDD (degenerative disc disease), lumbar    Spinal stenosis of lumbar region with neurogenic claudication    Lumbar spondylosis        Luciano Henriquez Jr. is a 69 y.o.  male with chronic bilateral low back pain that radiates the bilateral lower extremities.  Exact etiology of pain of unknown though I do have suspicion for neurogenic claudication/spinal stenosis.  Low suspicion for facet or sacroiliac mediated pain. Low suspicion for radiculopathy.  Disc space narrowing noted on lumbar x-rays with significant lower lumbar facet arthritis.  Multilevel foraminal and central stenosis noted on lumbar MRI.    1.  Pertinent imaging studies reviewed by me. Imaging results were discussed with patient.  2.  Continue physical therapy/home exercise program.  3.  Schedule for caudal epidural steroid injection.  4.  Return to clinic 2 weeks after procedure to discuss results.  May consider neurosurgery referral if appropriate. May also consider spinal cord stimulator trial.

## 2019-09-27 NOTE — PROGRESS NOTES
"  Physical Therapy Daily Treatment Note     Name: Luciano Henriquez Jr.    Clinic Number: [id number]    Therapy Diagnosis:   Encounter Diagnoses   Name Primary?    Acute bilateral low back pain with bilateral sciatica     Decreased range of motion     Decreased strength, endurance, and mobility        Physician: Juan Arteaga NP    Visit Date: 9/27/2019    Physician Orders: PT Eval and Treat  Medical Diagnosis from Referral: M54.41,M54.42,G89.29 (ICD-10-CM) - Chronic midline low back pain with bilateral sciatica  Evaluation Date: 7/26/2019  Authorization Period Expiration: 12/31/2019  Plan of Care Certification Period: 7/26/2019 to 10/4/2019 (PN due by 9/26/2019)  Visit #/Visits authorized: 18 / 20 ([facility] visit 11)    Time In: 10:00am  Time Out: 11:00am  Total Treatment Time: 60 minutes  Total Billable Time: 60 minutes    Precautions: Standard    Subjective     Pt reports: felt fine after last session  He was compliant with home exercise program.  Response to previous treatment: good, appropriate muscle response  Functional change: less pain    Pain: 3/10  Location: bilateral back      Objective     Luciano received therapeutic exercises to develop strength, endurance, ROM, flexibility, posture and core stabilization for 60 minutes including:    Tested 9/18/19  Lumbar Flexion 60   Lumbar Extension 0   Lumbar Peak Torque 151   Min Torque 45   Test Percent Below Normative Data 22       Treadmill for 10 minutes at 2.0mph    DKTC on SB x 20   LTR on SB x 20-NP  Supine HS stretch /c strap: 2 x 30" hold ea.-NP  Sit<>stand from 18inch platform x 10-NP     [facility] 9/27/2019   Visit Number 11   VAS Pain Rating 3   Treadmill Time (in min.) 10   Speed 2   Incline 0   Recumbent Bike Seat Pos. -   Time -   Flexion in Lying 20   Manual Therapy 0   Lumbar Extension Seat Pad 0   Femur Restraint 5   Top Dead Center 24   Counterweight 140   Lumbar Flexion 60   Lumbar Extension 0   Lumbar Peak Torque 155   Min " Torque 45   Test Percent Below Normative Data 22   Lumbar Weight 71   Repetitions 20   Rating of Perceived Exertion 3   Ice - Z Lie (in min.) -       Peripheral muscle strengthening which included 1 set of 15-20 repetitions at a slow, controlled 10-13 second per rep pace focused on strengthening supporting musculature for improved body mechanics and functional mobility.  Pt and therapist focused on proper form during treatment to ensure optimal strengthening of each targeted muscle group.  Machines were utilized including torso rotation, leg extension, leg curl, chest press, upright row, tricep extension, bicep curl, leg press, hip add, and hip abduction.    Luciano received the following manual therapy techniques: none    Luciano received kinesiotaping to his paraspinals and QL to relieve pain by decreasing muscle tension.     Patient was screened for use of kinesiotape and was cleared for use.   1. Has the patient ever had a reaction to the adhesive in bandaids? No  2. Has the patient ever uses athletic/kinesiotape in the past? No  3. Is the patient hemodynamically impaired (PE, DVT, CHF, Kidney failure)? No  4. Can the PT apply the tape to your skin? Yes     Patient was instructed on duration to wear the tape, proper drying techniques for the tape, and to remove the tape if his had any issues with it.   Patient verbalized understanding of these instructions.    Home Exercises Provided and Patient Education Provided     Education provided:   - continued compliance with HEP    Written Home Exercises Provided: Patient instructed to cont prior HEP.  Exercises were reviewed and Luciano was able to demonstrate them prior to the end of the session.  Luciano demonstrated good  understanding of the education provided.     See EMR under Patient Instructions for exercises provided prior visit.    Assessment     Patient tolerated treatment session well with no adverse reaction. Performed [employer] Lumbar Extension machine to 71 ft/lbs  for 20 repetitions today, RPE of 3.  Appropriate muscle response with global strengthening. Taping applied to paraspinals and QL to relieve pain and decrease muscle tension. Assess pt response to tape at next visit. Continue progressing patient per [facility] protocol.     Luciano is progressing well towards his goals.   Pt prognosis is Good.     Pt will continue to benefit from skilled outpatient physical therapy to address the deficits listed in the problem list box on initial evaluation, provide pt/family education and to maximize pt's level of independence in the home and community environment.     Pt's spiritual, cultural and educational needs considered and pt agreeable to plan of care and goals.    Anticipated barriers to physical therapy: chronic pain    Goals: Short Term Goals:  5 weeks  1. Report decreased in pain at worse less than  <   / =  4  /10  to increase tolerance for functional activities. Ongoing 5/10  2. Pt to increase B popliteal angle by greater than > or = 5 degrees in order to improve flexibility and posture. Met 8/27/19  3. Increased BLE MMT 1/2  to increase tolerance for ADL and work activities. Met 8/27/19  4. Pt to tolerate HEP to improve ROM and independence with ADL's. Met 8/23/19  5. Pt to improve range of motion by 25% to allow for improved functional mobility to allow for improvement in IADLs.  Met 8/27/19    Long Term Goals: 10 weeks  1. Report decreased in pain at worse less than  <   / =  2  /10  to increase tolerance for functional mobility.   2 .Pt to increase B popliteal angle by greater than > or = 10 degrees in order to improve flexibility and posture.   3. Increased BLE MMT 1 grade to increase tolerance for ADL and work activities.  4. Pt will report at CJ% or less limitation on FOTO Lumbar  to demonstrate decrease in disability and improvement in back pain.  5. Pt to be Independent with HEP to improve ROM and independence with ADL's  6. Pt to demonstrate negative Bridge  Test in order to show improved core strength for lumbar stabilization.   7. Pt to improve range of motion by 75% to allow for improved functional mobility to allow for improvement in IADLs.   8. Pt will report ability to ambulate 20 minutes without increased lumbar pain.   9. Pt will improve MedX testing score to improve by 20 percent to demonstrate improved motor control and strength.     Plan     Assess tolerance to treatment session and progress per POC.     Stacie Sharma, PT

## 2019-09-27 NOTE — PROGRESS NOTES
Subjective:     Patient ID: Luciano Henriquez Jr. is a 69 y.o. male    Chief Complaint: Follow-up      Referred by: No ref. provider found      HPI:    Interval History (9/27/19):  He returns today for follow up and MRI review.  He reports that his pain is unchanged in quality location since last encounter.  He denies any new or worsening symptoms.      Initial Encounter (9/13/19):  Luciano Henriquez Jr. is a 69 y.o. male who presents today with chronic bilateral low back pain. Onset of pain was gradual without any specific inciting events or injuries.  Patient localizes the pain across the lower lumbar region.  The pain does radiate to the bilateral buttocks and thighs.  He denies any associated numbness, tingling, weakness, bowel bladder dysfunction.  The pain is intermittent and worsened with activity.  Patient states that he can walk 50-60 yd before the pain becomes bothersome.  The pain typically improves after sitting for 1-2 minutes.   This pain is described in detail below.    Physical Therapy:  Yes.  Somewhat helpful.    Non-pharmacologic Treatment:  Rest helps         · TENS?  No    Pain Medications:         · Currently taking:  Ibuprofen, tizanidine    · Has tried in the past:      · Has not tried:  Opioids, , Tylenol, TCAs, SNRIs, anticonvulsants, topical creams    Blood thinners:  Aspirin    Interventional Therapies:  None    Relevant Surgeries:  None    Affecting sleep?  No    Affecting daily activities? yes    Depressive symptoms? no          · SI/HI? No    Work status: Retried    Pain Scores:    Best:       2/10  Worst:    8/10  Usually:   4/10  Today:  4/10    Review of Systems   Constitutional: Negative for activity change, appetite change, chills, fatigue, fever and unexpected weight change.   HENT: Negative for hearing loss.    Eyes: Negative for visual disturbance.   Respiratory: Negative for chest tightness and shortness of breath.    Cardiovascular: Negative for chest pain.    Gastrointestinal: Negative for abdominal pain, constipation, diarrhea, nausea and vomiting.   Genitourinary: Negative for difficulty urinating.   Musculoskeletal: Positive for arthralgias, back pain, gait problem and myalgias. Negative for neck pain.   Skin: Negative for rash.   Neurological: Negative for dizziness, weakness, light-headedness, numbness and headaches.   Psychiatric/Behavioral: Negative for hallucinations, sleep disturbance and suicidal ideas. The patient is not nervous/anxious.        Past Medical History:   Diagnosis Date    Allergy     Hyperlipidemia     Hypertension     Reflux esophagitis        Past Surgical History:   Procedure Laterality Date    arthroscopic on left knee      CARPAL TUNNEL RELEASE      right hand only    deviated septum repair      HERNIA REPAIR Left     inguinal    VASECTOMY         Social History     Socioeconomic History    Marital status:      Spouse name: Not on file    Number of children: Not on file    Years of education: Not on file    Highest education level: Not on file   Occupational History    Not on file   Social Needs    Financial resource strain: Not on file    Food insecurity:     Worry: Not on file     Inability: Not on file    Transportation needs:     Medical: Not on file     Non-medical: Not on file   Tobacco Use    Smoking status: Never Smoker    Smokeless tobacco: Never Used    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     Substance and Sexual Activity    Alcohol use: Yes     Alcohol/week: 14.0 standard drinks     Types: 14 Glasses of wine per week     Comment: Drinks 2 glasses of wine per night    Drug use: No    Sexual activity: Yes     Partners: Female   Lifestyle    Physical activity:     Days per week: Not on file     Minutes per session: Not on file    Stress: Not on file   Relationships    Social connections:     Talks on phone: Not on file     Gets together: Not on file     Attends  "Restorationism service: Not on file     Active member of club or organization: Not on file     Attends meetings of clubs or organizations: Not on file     Relationship status: Not on file   Other Topics Concern    Not on file   Social History Narrative    Not on file       Review of patient's allergies indicates:  No Known Allergies    Current Outpatient Medications on File Prior to Visit   Medication Sig Dispense Refill    albuterol (VENTOLIN HFA) 90 mcg/actuation inhaler Inhale 2 puffs into the lungs every 6 (six) hours as needed for Wheezing or Shortness of Breath. Rescue 18 g 0    amitriptyline (ELAVIL) 100 MG tablet       amLODIPine (NORVASC) 10 MG tablet Take 1 tablet (10 mg total) by mouth once daily. 90 tablet 3    ascorbic acid (VITAMIN C) 500 MG tablet Take 500 mg by mouth 2 (two) times daily.      aspirin (ECOTRIN) 81 MG EC tablet Take 81 mg by mouth once daily.      azelastine (ASTELIN) 137 mcg (0.1 %) nasal spray 1 spray (137 mcg total) by Nasal route 2 (two) times daily. 30 mL 1    BOOSTRIX TDAP 2.5-8-5 Lf-mcg-Lf/0.5mL Syrg injection       chlorthalidone (HYGROTEN) 25 MG Tab Take 1 tablet (25 mg total) by mouth once daily. 90 tablet 3    doxepin (ZONALON) 5 % cream       fish oil-omega-3 fatty acids 300-1,000 mg capsule Take 2 g by mouth once daily.      multivitamin capsule Take 1 capsule by mouth once daily.      olmesartan (BENICAR) 40 MG tablet Take 1 tablet (40 mg total) by mouth once daily. 90 tablet 3    rosuvastatin (CRESTOR) 10 MG tablet Take 1 tablet (10 mg total) by mouth every evening. 90 tablet 3    sucralfate (CARAFATE) 1 gram tablet Take 1 tablet (1 g total) by mouth 4 (four) times daily before meals and nightly. 360 tablet 2    tiZANidine (ZANAFLEX) 4 MG tablet Take 1 tablet (4 mg total) by mouth every 8 (eight) hours as needed. 30 tablet 1     No current facility-administered medications on file prior to visit.        Objective:      /72   Pulse 84   Ht 5' 5" (1.651 " m)   Wt 91.4 kg (201 lb 9.6 oz)   SpO2 96%   BMI 33.55 kg/m²     Exam:  GEN:  Well developed, well nourished.  No acute distress.   HEENT:  No trauma.  Mucous membranes moist.  Nares patent bilaterally.  PSYCH: Normal affect. Thought content appropriate.  CHEST:  Breathing symmetric.  No audible wheezing.  ABD: Soft, non-distended.  SKIN:  Warm, pink, dry.  No rash on exposed areas.    EXT:  No cyanosis, clubbing, or edema.  No color change or changes in nail or hair growth.  NEURO/MUSCULOSKELETAL:  Fully alert, oriented, and appropriate. Speech normal yair. No cranial nerve deficits.   Gait:  Normal.  No focal motor deficits.         Imaging:  Outside lumbar MRI done on 9/24/19:    L2-3:  Mild spinal central stenosis secondary to disc bulge and facet and ligamentum flavum hypertrophy.  Right greater than left moderate foraminal narrowing.    L3-4:  Moderate central stenosis secondary to disc bulge and facet and ligamentum flavum hypertrophy.  Moderate right and moderate to severe left foraminal narrowing with probable but min of both exiting L3 nerve roots.    L4-5:  Mild central stenosis secondary to disc bulge and facet and ligamentum flavum hypertrophy.  Moderate to severe bilateral foraminal narrowing with probable abutment of both exiting L4 nerve roots.    L5-S1:  Slight indentation of the thecal sac secondary to disc bulge.  Conceivable impingement of the transitioning S1 nerve roots.  Moderate to severe left> right foraminal stenosis        Assessment:       Encounter Diagnoses   Name Primary?    DDD (degenerative disc disease), lumbar Yes    Spinal stenosis of lumbar region with neurogenic claudication     Lumbar spondylosis          Plan:       Luciano was seen today for follow-up.    Diagnoses and all orders for this visit:    DDD (degenerative disc disease), lumbar    Spinal stenosis of lumbar region with neurogenic claudication    Lumbar spondylosis        Luciano Henriquez Jr. is a 69 y.o.  male with chronic bilateral low back pain that radiates the bilateral lower extremities.  Exact etiology of pain of unknown though I do have suspicion for neurogenic claudication/spinal stenosis.  Low suspicion for facet or sacroiliac mediated pain. Low suspicion for radiculopathy.  Disc space narrowing noted on lumbar x-rays with significant lower lumbar facet arthritis.  Multilevel foraminal and central stenosis noted on lumbar MRI.    1.  Pertinent imaging studies reviewed by me. Imaging results were discussed with patient.  2.  Continue physical therapy/home exercise program.  3.  Schedule for caudal epidural steroid injection.  4.  Return to clinic 2 weeks after procedure to discuss results.  May consider neurosurgery referral if appropriate. May also consider spinal cord stimulator trial.

## 2019-10-01 ENCOUNTER — DOCUMENTATION ONLY (OUTPATIENT)
Dept: PAIN MEDICINE | Facility: CLINIC | Age: 69
End: 2019-10-01

## 2019-10-01 ENCOUNTER — TELEPHONE (OUTPATIENT)
Dept: PAIN MEDICINE | Facility: CLINIC | Age: 69
End: 2019-10-01

## 2019-10-01 ENCOUNTER — TELEPHONE (OUTPATIENT)
Dept: FAMILY MEDICINE | Facility: CLINIC | Age: 69
End: 2019-10-01

## 2019-10-01 DIAGNOSIS — M51.36 DDD (DEGENERATIVE DISC DISEASE), LUMBAR: Primary | ICD-10-CM

## 2019-10-01 DIAGNOSIS — M54.16 LUMBAR RADICULOPATHY: ICD-10-CM

## 2019-10-01 NOTE — TELEPHONE ENCOUNTER
----- Message from Sherie Gan RN sent at 10/1/2019 11:49 AM CDT -----  Regarding: Clearance to hold ASA  Dr. Iglesias,    Mr. Henriquez is scheduled to have a Caudal BETO on 10/23/2019.  Dr. Warner is requesting that the patient stop taking ASA seven days prior to this procedure.  Please indicate whether or not he is able to stop taking the medication listed above.  Feel free to contact the clinic with any questions or concerns you may have.      Thank you in advance for your time and expertise,    MONIKA GrovesN, RN

## 2019-10-01 NOTE — TELEPHONE ENCOUNTER
Mr. Henriquez will be scheduled for a Caudal BETO on 10/23/2019.  Reviewed the pre-procedure instructions listed below with his wife- copy will also be sent via [employer].  Instructed patient to notify clinic if he begins feeling ill, runs a fever, is prescribed antibiotics, and/or has any outpatient procedures within the two weeks leading up to this procedure.  Instructed patient to report to [facility], 2nd Floor Endoscopy Registration Desk.  All questions answered- patient verbalized understanding.  Clearance to hold ASA x7 days will be sent to Dr. Iglesias.    Day of Procedure  - Ensure you have obtained an arrival time from the Pain Management Staff  o Procedure Area will call 1-3 days in advance with your arrival time.  Please check any voicemails received.  o If you arrive past your scheduled procedure time, you may be asked to reschedule your procedure.  - Ensure you have a  with you to remain present throughout your procedure for your safety.  o If you arrive without a responsible adult to stay with you and drive you home, you may be asked to reschedule your procedure.  - Take all of your prescribed medications (exceptions noted below) with a small amount of water  - This IS a fasting procedure: Please do not eat for six (6) hours before your scheduled arrival time.  o You may have clear liquids for up to three (3) hours before your arrival time.    - Clear liquids include: water, fruit juices without pulp, clear tea, and black coffee (no sugar and/or creamer/milk)  - Wear comfortable clothing (loose fitting pants).  - You may wear glasses, dentures, contact lenses, and/or hearing aids. Please remove all jewelry and metal hairpins.  - Notify the nurse during the intake process if you are allergic to any medications, if you are diabetic, or if you are not feeling well  - Contact the Pain Management Clinic with the following:  o A fever greater than 100° (degrees)   o Feel ill, have any type of  infection, or are taking antibiotics now or within the two (2) weeks leading up to this procedure  o Have any outpatient procedures within the two (2) weeks leading up to this procedure (colonoscopy, major dental work, etc.)    IF you are taking blood thinners: Only upon receiving clearance and notification from the Pain Management Department  7 Days Prior to Your Procedure:  - Stop taking Plavix/Clopridogrel, Effient/Prasugel, ASA  5 Days Prior to Your Procedure:  - Stop taking Coumadin/Warfarin.  An INR may be drawn prior to your procedure.  If labs are required, you will need to arrive earlier than your scheduled arrival time.  - Stop taking Pradaxa/Dabigatra,  Brilinta/ Ticagrelor  3 Days Prior to Your Procedure:  - Stop taking Xarelto/Rivaroxaban, Eliquis/Apixaban, Aggrenox/Dipyridamole, Reopro/Abciximab

## 2019-10-02 ENCOUNTER — PATIENT MESSAGE (OUTPATIENT)
Dept: ADMINISTRATIVE | Facility: OTHER | Age: 69
End: 2019-10-02

## 2019-10-02 ENCOUNTER — CLINICAL SUPPORT (OUTPATIENT)
Dept: REHABILITATION | Facility: HOSPITAL | Age: 69
End: 2019-10-02
Attending: NURSE PRACTITIONER
Payer: MEDICARE

## 2019-10-02 DIAGNOSIS — M25.60 DECREASED RANGE OF MOTION: ICD-10-CM

## 2019-10-02 DIAGNOSIS — R53.1 DECREASED STRENGTH, ENDURANCE, AND MOBILITY: ICD-10-CM

## 2019-10-02 DIAGNOSIS — M54.41 ACUTE BILATERAL LOW BACK PAIN WITH BILATERAL SCIATICA: ICD-10-CM

## 2019-10-02 DIAGNOSIS — Z74.09 DECREASED STRENGTH, ENDURANCE, AND MOBILITY: ICD-10-CM

## 2019-10-02 DIAGNOSIS — M54.42 ACUTE BILATERAL LOW BACK PAIN WITH BILATERAL SCIATICA: ICD-10-CM

## 2019-10-02 DIAGNOSIS — R68.89 DECREASED STRENGTH, ENDURANCE, AND MOBILITY: ICD-10-CM

## 2019-10-02 PROCEDURE — 97110 THERAPEUTIC EXERCISES: CPT | Mod: PN

## 2019-10-02 NOTE — PROGRESS NOTES
"  Physical Therapy Daily Treatment Note     Name: Luciano Henriquez Jr.    Clinic Number: [id number]    Therapy Diagnosis:   Encounter Diagnoses   Name Primary?    Acute bilateral low back pain with bilateral sciatica     Decreased range of motion     Decreased strength, endurance, and mobility        Physician: Juan Arteaga NP    Visit Date: 10/2/2019    Physician Orders: PT Eval and Treat  Medical Diagnosis from Referral: M54.41,M54.42,G89.29 (ICD-10-CM) - Chronic midline low back pain with bilateral sciatica  Evaluation Date: 7/26/2019  Authorization Period Expiration: 12/31/2019  Plan of Care Certification Period: 7/26/2019 to 10/4/2019 (PN due by 9/26/2019)  Visit #/Visits authorized: 19 / 20 ([facility] visit 12)    Time In: 1005  Time Out: 1110  Total Treatment Time: 55 minutes  Total Billable Time: 30 minutes    Precautions: Standard    Subjective     Pt reports: that he felt like the tape helped make his back pain more comfortable.   He was compliant with home exercise program.  Response to previous treatment: good, appropriate muscle response  Functional change: less pain    Pain: 4/10  Location: bilateral back      Objective     Luciano received therapeutic exercises to develop strength, endurance, ROM, flexibility, posture and core stabilization for 47 minutes including:    Tested 9/18/19  Lumbar Flexion 60   Lumbar Extension 0   Lumbar Peak Torque 151   Min Torque 45   Test Percent Below Normative Data 22       Treadmill for 10 minutes at 2.0mph    DKTC on SB x 20   LTR on SB x 20-NP  Supine HS stretch /c strap: 2 x 30" hold ea.-NP  Sit<>stand from 18inch platform x 10-NP     [facility] Therapy 10/2/2019   Visit Number 12   VAS Pain Rating 4   Treadmill Time (in min.) 10   Speed 2   Incline 0   Flexion in Lying 20   Manual Therapy 8   Lumbar Weight 71   Repetitions 20   Rating of Perceived Exertion 3   Ice - Z Lie (in min.) 0     Peripheral muscle strengthening which included 1 set of 15-20 " repetitions at a slow, controlled 10-13 second per rep pace focused on strengthening supporting musculature for improved body mechanics and functional mobility.  Pt and therapist focused on proper form during treatment to ensure optimal strengthening of each targeted muscle group.  Machines were utilized including torso rotation, leg extension, leg curl, chest press, upright row, tricep extension, bicep curl, leg press, hip add, and hip abduction.    Luciano received the following manual therapy techniques: none    Luciano received kinesiotaping to his paraspinals and QL to relieve pain by decreasing muscle tension. 8 minutes     Patient was screened for use of kinesiotape and was cleared for use.   1. Has the patient ever had a reaction to the adhesive in bandaids? No  2. Has the patient ever uses athletic/kinesiotape in the past? No  3. Is the patient hemodynamically impaired (PE, DVT, CHF, Kidney failure)? No  4. Can the PT apply the tape to your skin? Yes     Patient was instructed on duration to wear the tape, proper drying techniques for the tape, and to remove the tape if his had any issues with it. Patient verbalized understanding of these instructions.    Home Exercises Provided and Patient Education Provided     Education provided:   - continued compliance with HEP    Written Home Exercises Provided: Patient instructed to cont prior HEP.  Exercises were reviewed and Luciano was able to demonstrate them prior to the end of the session.  Luciano demonstrated good  understanding of the education provided.     See EMR under Patient Instructions for exercises provided prior visit.    Assessment     Patient tolerated treatment session well with no adverse reaction. Performed [email or web address] Lumbar Extension machine to 71 ft/lbs for 20 repetitions today, RPE of 3.  Appropriate muscle response with global strengthening. Taping applied to paraspinals and QL to relieve pain and decrease muscle tension. Continue progressing patient per  [facility] protocol.     Luciano is progressing well towards his goals.   Pt prognosis is Good.     Pt will continue to benefit from skilled outpatient physical therapy to address the deficits listed in the problem list box on initial evaluation, provide pt/family education and to maximize pt's level of independence in the home and community environment.     Pt's spiritual, cultural and educational needs considered and pt agreeable to plan of care and goals.    Anticipated barriers to physical therapy: chronic pain    Goals: Short Term Goals:  5 weeks  1. Report decreased in pain at worse less than  <   / =  4  /10  to increase tolerance for functional activities. Ongoing 5/10  2. Pt to increase B popliteal angle by greater than > or = 5 degrees in order to improve flexibility and posture. Met 8/27/19  3. Increased BLE MMT 1/2  to increase tolerance for ADL and work activities. Met 8/27/19  4. Pt to tolerate HEP to improve ROM and independence with ADL's. Met 8/23/19  5. Pt to improve range of motion by 25% to allow for improved functional mobility to allow for improvement in IADLs.  Met 8/27/19    Long Term Goals: 10 weeks  1. Report decreased in pain at worse less than  <   / =  2  /10  to increase tolerance for functional mobility.   2 .Pt to increase B popliteal angle by greater than > or = 10 degrees in order to improve flexibility and posture.   3. Increased BLE MMT 1 grade to increase tolerance for ADL and work activities.  4. Pt will report at CJ% or less limitation on FOTO Lumbar  to demonstrate decrease in disability and improvement in back pain.  5. Pt to be Independent with HEP to improve ROM and independence with ADL's  6. Pt to demonstrate negative Bridge Test in order to show improved core strength for lumbar stabilization.   7. Pt to improve range of motion by 75% to allow for improved functional mobility to allow for improvement in IADLs.   8. Pt will report ability to ambulate 20 minutes without  increased lumbar pain.   9. Pt will improve MedX testing score to improve by 20 percent to demonstrate improved motor control and strength.     Plan     Assess tolerance to treatment session and progress per POC.     Lissette Lundberg, PTA

## 2019-10-04 ENCOUNTER — CLINICAL SUPPORT (OUTPATIENT)
Dept: REHABILITATION | Facility: HOSPITAL | Age: 69
End: 2019-10-04
Attending: NURSE PRACTITIONER
Payer: MEDICARE

## 2019-10-04 DIAGNOSIS — M54.42 ACUTE BILATERAL LOW BACK PAIN WITH BILATERAL SCIATICA: ICD-10-CM

## 2019-10-04 DIAGNOSIS — M25.60 DECREASED RANGE OF MOTION: ICD-10-CM

## 2019-10-04 DIAGNOSIS — Z74.09 DECREASED STRENGTH, ENDURANCE, AND MOBILITY: ICD-10-CM

## 2019-10-04 DIAGNOSIS — R53.1 DECREASED STRENGTH, ENDURANCE, AND MOBILITY: ICD-10-CM

## 2019-10-04 DIAGNOSIS — R68.89 DECREASED STRENGTH, ENDURANCE, AND MOBILITY: ICD-10-CM

## 2019-10-04 DIAGNOSIS — M54.41 ACUTE BILATERAL LOW BACK PAIN WITH BILATERAL SCIATICA: ICD-10-CM

## 2019-10-04 PROCEDURE — 97110 THERAPEUTIC EXERCISES: CPT | Mod: PN

## 2019-10-04 NOTE — PROGRESS NOTES
"  Physical Therapy Daily Treatment Note     Name: Luciano Henriquez Jr.    Clinic Number: [id number]    Therapy Diagnosis:   Encounter Diagnoses   Name Primary?    Acute bilateral low back pain with bilateral sciatica     Decreased range of motion     Decreased strength, endurance, and mobility        Physician: Juan Arteaga NP    Visit Date: 10/4/2019    Physician Orders: PT Eval and Treat  Medical Diagnosis from Referral: M54.41,M54.42,G89.29 (ICD-10-CM) - Chronic midline low back pain with bilateral sciatica  Evaluation Date: 7/26/2019  Authorization Period Expiration: 12/31/2019  Plan of Care Certification Period: 7/26/2019 to 10/4/2019 (PN due by 9/26/2019)  Visit #/Visits authorized: 20 / 20 (Healthy Back visit 13)    Time In: 9:30 am  Time Out: 10:20 am  Total Treatment Time: 50 minutes  Total Billable Time: 30 minutes    Precautions: Standard    Subjective     Pt reports: that he has lower back pain about 3/10 today. Pt states healthy back program is helping with flexibility and some pain relief.   He was compliant with home exercise program.  Response to previous treatment: good, appropriate muscle response  Functional change: less pain    Pain: 3/10  Location: bilateral back      Objective     Tested 9/18/19  Lumbar Flexion 60   Lumbar Extension 0   Lumbar Peak Torque 151   Min Torque 45   Test Percent Below Normative Data 22         Luciano received therapeutic exercises to develop strength, endurance, ROM, flexibility, posture and core stabilization for 50 minutes including:      DKTC on SB x 20   LTR on SB x 20  Supine HS stretch /c strap: 2 x 30" hold ea.  Sit<>stand from 18inch platform x 10     [facility] Therapy 10/4/2019   Visit Number 13   VAS Pain Rating 3   Treadmill Time (in min.) 10   Speed 2   Incline 0   Recumbent Bike Seat Pos. -   Time -   Flexion in Lying -   Manual Therapy -   Lumbar Extension Seat Pad -   Femur Restraint -   Top Dead Center -   Counterweight -   Lumbar Flexion - "   Lumbar Extension -   Lumbar Peak Torque -   Min Torque -   Test Percent Below Normative Data -   Lumbar Weight 71   Repetitions 20   Rating of Perceived Exertion 3   Ice - Z Lie (in min.) 0       Peripheral muscle strengthening which included 1 set of 15-20 repetitions at a slow, controlled 10-13 second per rep pace focused on strengthening supporting musculature for improved body mechanics and functional mobility.  Pt and therapist focused on proper form during treatment to ensure optimal strengthening of each targeted muscle group.  Machines were utilized including torso rotation, leg extension, leg curl, chest press, upright row, tricep extension, bicep curl, leg press, hip add, and hip abduction.    Luciano received the following manual therapy techniques: none    Luciano received kinesiotaping to his paraspinals and QL to relieve pain by decreasing muscle tension. 8 minutes     Patient was screened for use of kinesiotape and was cleared for use.   1. Has the patient ever had a reaction to the adhesive in bandaids? No  2. Has the patient ever uses athletic/kinesiotape in the past? No  3. Is the patient hemodynamically impaired (PE, DVT, CHF, Kidney failure)? No  4. Can the PT apply the tape to your skin? Yes     Patient was instructed on duration to wear the tape, proper drying techniques for the tape, and to remove the tape if his had any issues with it. Patient verbalized understanding of these instructions.    Home Exercises Provided and Patient Education Provided     Education provided:   - continued compliance with HEP    Written Home Exercises Provided: Patient instructed to cont prior HEP.  Exercises were reviewed and Luciano was able to demonstrate them prior to the end of the session.  Luciano demonstrated good  understanding of the education provided.     See EMR under Patient Instructions for exercises provided prior visit.    Assessment     Patient tolerated treatment session well with no adverse reaction.  Weights were not increased today due to increase in lower back pain. Pt cont with same Medx lumbar extension weights today.  Performed MedX Lumbar Extension machine to 71 ft/lbs for 20 repetitions today, RPE of 3.  Appropriate muscle response with global strengthening. Continue progressing patient per Healthy Back protocol.     Luciano is progressing well towards his goals.   Pt prognosis is Good.     Pt will continue to benefit from skilled outpatient physical therapy to address the deficits listed in the problem list box on initial evaluation, provide pt/family education and to maximize pt's level of independence in the home and community environment.     Pt's spiritual, cultural and educational needs considered and pt agreeable to plan of care and goals.    Anticipated barriers to physical therapy: chronic pain    Goals: Short Term Goals:  5 weeks  1. Report decreased in pain at worse less than  <   / =  4  /10  to increase tolerance for functional activities. Ongoing 5/10  2. Pt to increase B popliteal angle by greater than > or = 5 degrees in order to improve flexibility and posture. Met 8/27/19  3. Increased BLE MMT 1/2  to increase tolerance for ADL and work activities. Met 8/27/19  4. Pt to tolerate HEP to improve ROM and independence with ADL's. Met 8/23/19  5. Pt to improve range of motion by 25% to allow for improved functional mobility to allow for improvement in IADLs.  Met 8/27/19    Long Term Goals: 10 weeks  1. Report decreased in pain at worse less than  <   / =  2  /10  to increase tolerance for functional mobility.   2 .Pt to increase B popliteal angle by greater than > or = 10 degrees in order to improve flexibility and posture.   3. Increased BLE MMT 1 grade to increase tolerance for ADL and work activities.  4. Pt will report at CJ% or less limitation on FOTO Lumbar  to demonstrate decrease in disability and improvement in back pain.  5. Pt to be Independent with HEP to improve ROM and  independence with ADL's  6. Pt to demonstrate negative Bridge Test in order to show improved core strength for lumbar stabilization.   7. Pt to improve range of motion by 75% to allow for improved functional mobility to allow for improvement in IADLs.   8. Pt will report ability to ambulate 20 minutes without increased lumbar pain.   9. Pt will improve MedX testing score to improve by 20 percent to demonstrate improved motor control and strength.     Plan     Assess tolerance to treatment session and progress per POC.     Isaías Antunez, PT

## 2019-10-08 ENCOUNTER — CLINICAL SUPPORT (OUTPATIENT)
Dept: REHABILITATION | Facility: HOSPITAL | Age: 69
End: 2019-10-08
Attending: NURSE PRACTITIONER
Payer: MEDICARE

## 2019-10-08 DIAGNOSIS — M54.42 ACUTE BILATERAL LOW BACK PAIN WITH BILATERAL SCIATICA: ICD-10-CM

## 2019-10-08 DIAGNOSIS — M25.60 DECREASED RANGE OF MOTION: ICD-10-CM

## 2019-10-08 DIAGNOSIS — R53.1 DECREASED STRENGTH, ENDURANCE, AND MOBILITY: ICD-10-CM

## 2019-10-08 DIAGNOSIS — Z74.09 DECREASED STRENGTH, ENDURANCE, AND MOBILITY: ICD-10-CM

## 2019-10-08 DIAGNOSIS — M54.41 ACUTE BILATERAL LOW BACK PAIN WITH BILATERAL SCIATICA: ICD-10-CM

## 2019-10-08 DIAGNOSIS — R68.89 DECREASED STRENGTH, ENDURANCE, AND MOBILITY: ICD-10-CM

## 2019-10-08 PROCEDURE — 97110 THERAPEUTIC EXERCISES: CPT | Mod: PN

## 2019-10-08 NOTE — PROGRESS NOTES
"  Physical Therapy Daily Treatment Note     Name: Luciano Henriquez Jr.    Clinic Number: [id number]    Therapy Diagnosis:   Encounter Diagnoses   Name Primary?    Acute bilateral low back pain with bilateral sciatica     Decreased range of motion     Decreased strength, endurance, and mobility        Physician: Juan Arteaga NP    Visit Date: 10/8/2019    Physician Orders: PT Eval and Treat  Medical Diagnosis from Referral: M54.41,M54.42,G89.29 (ICD-10-CM) - Chronic midline low back pain with bilateral sciatica  Evaluation Date: 7/26/2019  Authorization Period Expiration: 12/31/2019  Plan of Care Certification Period:10/4/2019 -11/19/19 (PN due by 10/26/2019)  Visit #/Visits authorized: 1/7 (Healthy Back visit 14, total visit 21)    Time In: 12:00pm  Time Out: 1:05pm  Total Treatment Time: 65 minutes  Total Billable Time: 55 minutes    Precautions: Standard    Subjective     Pt reports: that he has lower back pain about 3/10 today. Pt states healthy back program is helping with flexibility and some pain relief. States he is getting an injection in lumbar for spinal stenosis on 10/23    He was compliant with home exercise program.  Response to previous treatment: good, appropriate muscle response  Functional change: less pain    Pain: 3/10  Location: bilateral back      Objective     Tested 9/18/19  Lumbar Flexion 60   Lumbar Extension 0   Lumbar Peak Torque 151   Min Torque 45   Test Percent Below Normative Data 22       Luciano received therapeutic exercises to develop strength, endurance, ROM, flexibility, posture and core stabilization for 50 minutes including:    DKTC on SB x 20   LTR on SB x 20  Supine HS stretch /c strap: 2 x 30" hold ea.  Sit<>stand from 18inch platform x 10     [facility] Therapy 10/8/2019   Visit Number 14   VAS Pain Rating 3   Treadmill Time (in min.) 10   Speed 2   Incline 0   Recumbent Bike Seat Pos. -   Time -   Flexion in Lying 20   Manual Therapy -   Lumbar Extension Seat Pad 0 "   Femur Restraint 4   Top Dead Center 24   Counterweight 140   Lumbar Flexion 60   Lumbar Extension 0   Lumbar Peak Torque -   Min Torque -   Test Percent Below Normative Data -   Lumbar Weight 73   Repetitions 20   Rating of Perceived Exertion 3   Ice - Z Lie (in min.) 0         Peripheral muscle strengthening which included 1 set of 15-20 repetitions at a slow, controlled 10-13 second per rep pace focused on strengthening supporting musculature for improved body mechanics and functional mobility.  Pt and therapist focused on proper form during treatment to ensure optimal strengthening of each targeted muscle group.  Machines were utilized including torso rotation, leg extension, leg curl, chest press, upright row, tricep extension, bicep curl, leg press, hip add, and hip abduction.    Luciano received the following manual therapy techniques: none    Luciano received kinesiotaping to his paraspinals and QL to relieve pain by decreasing muscle tension. 8 minutes     Patient was screened for use of kinesiotape and was cleared for use.   1. Has the patient ever had a reaction to the adhesive in bandaids? No  2. Has the patient ever uses athletic/kinesiotape in the past? No  3. Is the patient hemodynamically impaired (PE, DVT, CHF, Kidney failure)? No  4. Can the PT apply the tape to your skin? Yes     Patient was instructed on duration to wear the tape, proper drying techniques for the tape, and to remove the tape if his had any issues with it. Patient verbalized understanding of these instructions.    Home Exercises Provided and Patient Education Provided     Education provided:   - continued compliance with HEP    Written Home Exercises Provided: Patient instructed to cont prior HEP.  Exercises were reviewed and Luciano was able to demonstrate them prior to the end of the session.  Luciano demonstrated good  understanding of the education provided.     See EMR under Patient Instructions for exercises provided prior  visit.    Assessment     Patient tolerated treatment session well with no adverse reaction. Pt cont with same Medx lumbar extension weights today. Performed MedX Lumbar Extension machine to 73 ft/lbs for 20 repetitions today, RPE of 3.  Appropriate muscle response with global strengthening. Continue progressing patient per POC for another 5 weeks and/or 6 visits to finish Healthy Back protocol.     Luciano is progressing well towards his goals.   Pt prognosis is Good.     Pt will continue to benefit from skilled outpatient physical therapy to address the deficits listed in the problem list box on initial evaluation, provide pt/family education and to maximize pt's level of independence in the home and community environment.     Pt's spiritual, cultural and educational needs considered and pt agreeable to plan of care and goals.    Anticipated barriers to physical therapy: chronic pain    Goals: Short Term Goals:  5 weeks  1. Report decreased in pain at worse less than  <   / =  4  /10  to increase tolerance for functional activities. progressing 5/10  2. Pt to increase B popliteal angle by greater than > or = 5 degrees in order to improve flexibility and posture. Met 8/27/19  3. Increased BLE MMT 1/2  to increase tolerance for ADL and work activities. Met 8/27/19  4. Pt to tolerate HEP to improve ROM and independence with ADL's. Met 8/23/19  5. Pt to improve range of motion by 25% to allow for improved functional mobility to allow for improvement in IADLs.  Met 8/27/19    Long Term Goals: 10 weeks  1. Report decreased in pain at worse less than  <   / =  2  /10  to increase tolerance for functional mobility. progressing  2 .Pt to increase B popliteal angle by greater than > or = 10 degrees in order to improve flexibility and posture. progressing  3. Increased BLE MMT 1 grade to increase tolerance for ADL and work activities.progressing  4. Pt will report at CJ% or less limitation on FOTO Lumbar  to demonstrate  decrease in disability and improvement in back pain.progressing  5. Pt to be Independent with HEP to improve ROM and independence with ADL's. Met 10/8/19  6. Pt to demonstrate negative Bridge Test in order to show improved core strength for lumbar stabilization. Met 10/8/19  7. Pt to improve range of motion by 75% to allow for improved functional mobility to allow for improvement in IADLs. progressing  8. Pt will report ability to ambulate 20 minutes without increased lumbar pain.progressing   9. Pt will improve MedX testing score to improve by 20 percent to demonstrate improved motor control and strength.progressing     Plan     Assess tolerance to treatment session and progress per POC. Extend POC to finish healthy back protocol to 11/19/19    Stacie Sharma, PT

## 2019-10-10 ENCOUNTER — CLINICAL SUPPORT (OUTPATIENT)
Dept: REHABILITATION | Facility: HOSPITAL | Age: 69
End: 2019-10-10
Attending: NURSE PRACTITIONER
Payer: MEDICARE

## 2019-10-10 DIAGNOSIS — R68.89 DECREASED STRENGTH, ENDURANCE, AND MOBILITY: ICD-10-CM

## 2019-10-10 DIAGNOSIS — M25.60 DECREASED RANGE OF MOTION: ICD-10-CM

## 2019-10-10 DIAGNOSIS — M54.41 ACUTE BILATERAL LOW BACK PAIN WITH BILATERAL SCIATICA: ICD-10-CM

## 2019-10-10 DIAGNOSIS — R53.1 DECREASED STRENGTH, ENDURANCE, AND MOBILITY: ICD-10-CM

## 2019-10-10 DIAGNOSIS — M54.42 ACUTE BILATERAL LOW BACK PAIN WITH BILATERAL SCIATICA: ICD-10-CM

## 2019-10-10 DIAGNOSIS — Z74.09 DECREASED STRENGTH, ENDURANCE, AND MOBILITY: ICD-10-CM

## 2019-10-10 PROCEDURE — 97110 THERAPEUTIC EXERCISES: CPT | Mod: PN

## 2019-10-10 NOTE — PROGRESS NOTES
"  Physical Therapy Daily Treatment Note     Name: Luciano Henriquez Jr.    Clinic Number: [id number]    Therapy Diagnosis:   Encounter Diagnoses   Name Primary?    Acute bilateral low back pain with bilateral sciatica     Decreased range of motion     Decreased strength, endurance, and mobility        Physician: Juan Arteaga NP    Visit Date: 10/10/2019    Physician Orders: PT Eval and Treat  Medical Diagnosis from Referral: M54.41,M54.42,G89.29 (ICD-10-CM) - Chronic midline low back pain with bilateral sciatica  Evaluation Date: 7/26/2019  Authorization Period Expiration: 12/31/2019  Plan of Care Certification Period:10/4/2019 -11/19/19 (PN due by 10/26/2019)  Visit #/Visits authorized: 2/7 (Healthy Back visit 15, total visit 22)    Time In: 1102  Time Out: 1211  Total Treatment Time: 69 minutes  Total Billable Time: 10 minutes    Precautions: Standard    Subjective     Pt reports: that he has lower back pain about 3/10 today. Pt reports program has been helping. He feels better walking out the door than when he walks in.     He was compliant with home exercise program.  Response to previous treatment: good, appropriate muscle response  Functional change: less pain    Pain: 3/10  Location: bilateral back      Objective     Tested 9/18/19  Lumbar Flexion 60   Lumbar Extension 0   Lumbar Peak Torque 151   Min Torque 45   Test Percent Below Normative Data 22       Luciano received therapeutic exercises to develop strength, endurance, ROM, flexibility, posture and core stabilization for 50 minutes including:    DKTC on SB x 20   LTR on SB x 20  Supine HS stretch /c strap: 2 x 30" hold ea.  Sit<>stand from 18inch platform x 10     [facility] 10/10/2019   Visit Number 15   VAS Pain Rating 3   Treadmill Time (in min.) 10   Speed 2   Incline 0   Lumbar Weight 75   Repetitions 20   Rating of Perceived Exertion 4   Ice - Z Lie (in min.) 10           Peripheral muscle strengthening which included 1 set of 15-20 " repetitions at a slow, controlled 10-13 second per rep pace focused on strengthening supporting musculature for improved body mechanics and functional mobility.  Pt and therapist focused on proper form during treatment to ensure optimal strengthening of each targeted muscle group.  Machines were utilized including torso rotation, leg extension, leg curl, chest press, upright row, tricep extension, bicep curl, leg press, hip add, and hip abduction.    Luciano received the following manual therapy techniques: none    Luciano received kinesiotaping to his paraspinals and QL to relieve pain by decreasing muscle tension. 8 minutes     Patient was screened for use of kinesiotape and was cleared for use.   1. Has the patient ever had a reaction to the adhesive in bandaids? No  2. Has the patient ever uses athletic/kinesiotape in the past? No  3. Is the patient hemodynamically impaired (PE, DVT, CHF, Kidney failure)? No  4. Can the PT apply the tape to your skin? Yes     Patient was instructed on duration to wear the tape, proper drying techniques for the tape, and to remove the tape if his had any issues with it. Patient verbalized understanding of these instructions.    Home Exercises Provided and Patient Education Provided     Education provided:   - continued compliance with HEP    Written Home Exercises Provided: Patient instructed to cont prior HEP.  Exercises were reviewed and Luciano was able to demonstrate them prior to the end of the session.  Luciano demonstrated good  understanding of the education provided.     See EMR under Patient Instructions for exercises provided prior visit.    Assessment     Patient tolerated treatment session well with no adverse reaction. Progressed resistance on MedX Lumbar machine today to 75 ft/lbs. Performed MedX Lumbar Extension machine at 75 ft/lbs for 20 repetitions today, RPE of 4/10.  Appropriate muscle response with global strengthening. Continue progressing patient per POC for  another 5 weeks and/or 5 visits to finish Healthy Back protocol.     Luciano is progressing well towards his goals.   Pt prognosis is Good.     Pt will continue to benefit from skilled outpatient physical therapy to address the deficits listed in the problem list box on initial evaluation, provide pt/family education and to maximize pt's level of independence in the home and community environment.     Pt's spiritual, cultural and educational needs considered and pt agreeable to plan of care and goals.    Anticipated barriers to physical therapy: chronic pain    Goals: Short Term Goals:  5 weeks  1. Report decreased in pain at worse less than  <   / =  4  /10  to increase tolerance for functional activities. progressing 5/10  2. Pt to increase B popliteal angle by greater than > or = 5 degrees in order to improve flexibility and posture. Met 8/27/19  3. Increased BLE MMT 1/2  to increase tolerance for ADL and work activities. Met 8/27/19  4. Pt to tolerate HEP to improve ROM and independence with ADL's. Met 8/23/19  5. Pt to improve range of motion by 25% to allow for improved functional mobility to allow for improvement in IADLs.  Met 8/27/19    Long Term Goals: 10 weeks  1. Report decreased in pain at worse less than  <   / =  2  /10  to increase tolerance for functional mobility. progressing  2 .Pt to increase B popliteal angle by greater than > or = 10 degrees in order to improve flexibility and posture. progressing  3. Increased BLE MMT 1 grade to increase tolerance for ADL and work activities.progressing  4. Pt will report at CJ% or less limitation on FOTO Lumbar  to demonstrate decrease in disability and improvement in back pain.progressing  5. Pt to be Independent with HEP to improve ROM and independence with ADL's. Met 10/8/19  6. Pt to demonstrate negative Bridge Test in order to show improved core strength for lumbar stabilization. Met 10/8/19  7. Pt to improve range of motion by 75% to allow for improved  functional mobility to allow for improvement in IADLs. progressing  8. Pt will report ability to ambulate 20 minutes without increased lumbar pain.progressing   9. Pt will improve MedX testing score to improve by 20 percent to demonstrate improved motor control and strength.progressing     Plan   Plan of Care Expiration: 11/19/19    Assess tolerance to treatment session and progress per POC. Extend POC to finish healthy back protocol to 11/19/19    Artie Cooley, PT, DPT  10/10/2019

## 2019-10-15 ENCOUNTER — CLINICAL SUPPORT (OUTPATIENT)
Dept: REHABILITATION | Facility: HOSPITAL | Age: 69
End: 2019-10-15
Attending: NURSE PRACTITIONER
Payer: MEDICARE

## 2019-10-15 ENCOUNTER — TELEPHONE (OUTPATIENT)
Dept: PAIN MEDICINE | Facility: CLINIC | Age: 69
End: 2019-10-15

## 2019-10-15 DIAGNOSIS — R53.1 DECREASED STRENGTH, ENDURANCE, AND MOBILITY: ICD-10-CM

## 2019-10-15 DIAGNOSIS — Z74.09 DECREASED STRENGTH, ENDURANCE, AND MOBILITY: ICD-10-CM

## 2019-10-15 DIAGNOSIS — R68.89 DECREASED STRENGTH, ENDURANCE, AND MOBILITY: ICD-10-CM

## 2019-10-15 DIAGNOSIS — M54.41 ACUTE BILATERAL LOW BACK PAIN WITH BILATERAL SCIATICA: ICD-10-CM

## 2019-10-15 DIAGNOSIS — M54.42 ACUTE BILATERAL LOW BACK PAIN WITH BILATERAL SCIATICA: ICD-10-CM

## 2019-10-15 DIAGNOSIS — M25.60 DECREASED RANGE OF MOTION: ICD-10-CM

## 2019-10-15 PROCEDURE — 97110 THERAPEUTIC EXERCISES: CPT | Mod: PN

## 2019-10-15 NOTE — TELEPHONE ENCOUNTER
Message left reminding Mr. Henriquez that today will be the last dose of ASA until after the procedure scheduled 10/23/19.  Included review of instructions as well as arrival time of 0800- information also sent via [employer].  Asked that patient call the clinic to confirm.

## 2019-10-15 NOTE — PROGRESS NOTES
"  Physical Therapy Daily Treatment Note     Name: Luciano Henriquez Jr.    Clinic Number: [id number]    Therapy Diagnosis:   Encounter Diagnoses   Name Primary?    Acute bilateral low back pain with bilateral sciatica     Decreased range of motion     Decreased strength, endurance, and mobility        Physician: Juan Arteaga NP    Visit Date: 10/15/2019    Physician Orders: PT Eval and Treat  Medical Diagnosis from Referral: M54.41,M54.42,G89.29 (ICD-10-CM) - Chronic midline low back pain with bilateral sciatica  Evaluation Date: 7/26/2019  Authorization Period Expiration: 12/31/2019  Plan of Care Certification Period:10/4/2019 -11/19/19 (PN due by 10/26/2019)  Visit #/Visits authorized: 3/7 (Healthy Back visit 16, total visit 23)    Time In: 1030  Time Out: 1120  Total Treatment Time: 40 minutes  Total Billable Time: 35 minutes    Precautions: Standard    Subjective     Pt reports: low back stiffness that was worse this morning prior to completing his HEP.     He was compliant with home exercise program.  Response to previous treatment: good, appropriate muscle response  Functional change: less pain    Pain: 3/10  Location: bilateral back      Objective     Tested 9/18/19  Lumbar Flexion 60   Lumbar Extension 0   Lumbar Peak Torque 151   Min Torque 45   Test Percent Below Normative Data 22     Luciano received therapeutic exercises to develop strength, endurance, ROM, flexibility, posture and core stabilization for 40 minutes including:    DKTC on SB x 20   LTR on SB x 20  Supine HS stretch /c strap: 2 x 30" hold ea.  Sit<>stand from 18inch platform x 10     [facility] 10/15/2019   Visit Number 16   VAS Pain Rating 3   Treadmill Time (in min.) 10   Speed 2   Incline 0   Flexion in Lying 20   Manual Therapy 0   Lumbar Weight 75   Repetitions 20   Rating of Perceived Exertion 3   Ice - Z Lie (in min.) 10     Peripheral muscle strengthening which included 1 set of 15-20 repetitions at a slow, controlled " 10-13 second per rep pace focused on strengthening supporting musculature for improved body mechanics and functional mobility.  Pt and therapist focused on proper form during treatment to ensure optimal strengthening of each targeted muscle group.  Machines were utilized including torso rotation, leg extension, leg curl, chest press, upright row, tricep extension, bicep curl, leg press, hip add, and hip abduction.    Luciano received the following manual therapy techniques: none    Home Exercises Provided and Patient Education Provided     Education provided:   - continued compliance with HEP    Written Home Exercises Provided: Patient instructed to cont prior HEP.  Exercises were reviewed and Luciano was able to demonstrate them prior to the end of the session.  Luciano demonstrated good  understanding of the education provided.     See EMR under Patient Instructions for exercises provided prior visit.    Assessment     Patient tolerated treatment session good reporting appropriate muscle response. Good tolerance to MedX Lumbar Extension machine at 75 ft/lbs. Patient completed 20 repetitions at an RPE of 3. Patient is progressing well with current POC.     Luciano is progressing well towards his goals.   Pt prognosis is Good.     Pt will continue to benefit from skilled outpatient physical therapy to address the deficits listed in the problem list box on initial evaluation, provide pt/family education and to maximize pt's level of independence in the home and community environment.     Pt's spiritual, cultural and educational needs considered and pt agreeable to plan of care and goals.    Anticipated barriers to physical therapy: chronic pain    Goals: Short Term Goals:  5 weeks  1. Report decreased in pain at worse less than  <   / =  4  /10  to increase tolerance for functional activities. progressing 5/10  2. Pt to increase B popliteal angle by greater than > or = 5 degrees in order to improve flexibility and posture.  Met 8/27/19  3. Increased BLE MMT 1/2  to increase tolerance for ADL and work activities. Met 8/27/19  4. Pt to tolerate HEP to improve ROM and independence with ADL's. Met 8/23/19  5. Pt to improve range of motion by 25% to allow for improved functional mobility to allow for improvement in IADLs.  Met 8/27/19    Long Term Goals: 10 weeks  1. Report decreased in pain at worse less than  <   / =  2  /10  to increase tolerance for functional mobility. progressing  2 .Pt to increase B popliteal angle by greater than > or = 10 degrees in order to improve flexibility and posture. progressing  3. Increased BLE MMT 1 grade to increase tolerance for ADL and work activities.progressing  4. Pt will report at CJ% or less limitation on FOTO Lumbar  to demonstrate decrease in disability and improvement in back pain.progressing  5. Pt to be Independent with HEP to improve ROM and independence with ADL's. Met 10/8/19  6. Pt to demonstrate negative Bridge Test in order to show improved core strength for lumbar stabilization. Met 10/8/19  7. Pt to improve range of motion by 75% to allow for improved functional mobility to allow for improvement in IADLs. progressing  8. Pt will report ability to ambulate 20 minutes without increased lumbar pain.progressing   9. Pt will improve MedX testing score to improve by 20 percent to demonstrate improved motor control and strength.progressing     Plan     Plan of Care Expiration: 11/19/19    Assess tolerance to treatment session and progress per POC. Extend POC to finish healthy back protocol to 11/19/19    Lissette Lundberg, PTA  10/15/2019

## 2019-10-17 ENCOUNTER — CLINICAL SUPPORT (OUTPATIENT)
Dept: REHABILITATION | Facility: HOSPITAL | Age: 69
End: 2019-10-17
Attending: NURSE PRACTITIONER
Payer: MEDICARE

## 2019-10-17 DIAGNOSIS — M54.42 ACUTE BILATERAL LOW BACK PAIN WITH BILATERAL SCIATICA: ICD-10-CM

## 2019-10-17 DIAGNOSIS — M25.60 DECREASED RANGE OF MOTION: ICD-10-CM

## 2019-10-17 DIAGNOSIS — Z74.09 DECREASED STRENGTH, ENDURANCE, AND MOBILITY: ICD-10-CM

## 2019-10-17 DIAGNOSIS — R53.1 DECREASED STRENGTH, ENDURANCE, AND MOBILITY: ICD-10-CM

## 2019-10-17 DIAGNOSIS — R68.89 DECREASED STRENGTH, ENDURANCE, AND MOBILITY: ICD-10-CM

## 2019-10-17 DIAGNOSIS — M54.41 ACUTE BILATERAL LOW BACK PAIN WITH BILATERAL SCIATICA: ICD-10-CM

## 2019-10-17 PROCEDURE — 97110 THERAPEUTIC EXERCISES: CPT | Mod: PN

## 2019-10-17 NOTE — PROGRESS NOTES
"  Physical Therapy Daily Treatment Note     Name: Luciano Henriquez Jr.    Clinic Number: [id number]    Therapy Diagnosis:   Encounter Diagnoses   Name Primary?    Acute bilateral low back pain with bilateral sciatica     Decreased range of motion     Decreased strength, endurance, and mobility        Physician: Juan Arteaga NP    Visit Date: 10/17/2019    Physician Orders: PT Eval and Treat  Medical Diagnosis from Referral: M54.41,M54.42,G89.29 (ICD-10-CM) - Chronic midline low back pain with bilateral sciatica  Evaluation Date: 7/26/2019  Authorization Period Expiration: 12/31/2019  Plan of Care Certification Period:10/4/2019 -11/19/19 (PN due by 10/26/2019)  Visit #/Visits authorized: 4/7 (Healthy Back visit 17, total visit 24)    Time In: 1110  Time Out: 1220  Total Treatment Time: 60 minutes  Total Billable Time: 50 minutes    Precautions: Standard    Subjective     Pt reports: that he is having his normal low back stiffness.    He was compliant with home exercise program.  Response to previous treatment: good, appropriate muscle response  Functional change: less pain    Pain: 3/10  Location: bilateral back      Objective     Tested 9/18/19  Lumbar Flexion 60   Lumbar Extension 0   Lumbar Peak Torque 151   Min Torque 45   Test Percent Below Normative Data 22     Luciano received therapeutic exercises to develop strength, endurance, ROM, flexibility, posture and core stabilization for 60 minutes including:    DKTC on SB x 20   LTR on SB x 20  Supine HS stretch /c strap: 2 x 30" hold ea.  Sit<>stand from 18inch platform x 10     [facility] 10/17/2019   Visit Number 17   VAS Pain Rating 3   Treadmill Time (in min.) 10   Speed 2   Incline 0   Flexion in Lying 20   Manual Therapy 0   Lumbar Weight 75   Repetitions 20   Rating of Perceived Exertion 3   Ice - Z Lie (in min.) 10     Peripheral muscle strengthening which included 1 set of 15-20 repetitions at a slow, controlled 10-13 second per rep pace " focused on strengthening supporting musculature for improved body mechanics and functional mobility.  Pt and therapist focused on proper form during treatment to ensure optimal strengthening of each targeted muscle group.  Machines were utilized including torso rotation, leg extension, leg curl, chest press, upright row, tricep extension, bicep curl, leg press, hip add, and hip abduction.    Luciano received the following manual therapy techniques: none    Home Exercises Provided and Patient Education Provided     Education provided:   - continued compliance with HEP    Written Home Exercises Provided: Patient instructed to cont prior HEP.  Exercises were reviewed and Luciano was able to demonstrate them prior to the end of the session.  Luciano demonstrated good  understanding of the education provided.     See EMR under Patient Instructions for exercises provided prior visit.    Assessment     Patient tolerated treatment session good reporting appropriate muscle response. Good tolerance to MedX Lumbar Extension machine at 75 ft/lbs. Patient completed 20 repetitions at an RPE of 3. Consider progression of weight next visit. Patient is progressing well with current POC.     Luciano is progressing well towards his goals.   Pt prognosis is Good.     Pt will continue to benefit from skilled outpatient physical therapy to address the deficits listed in the problem list box on initial evaluation, provide pt/family education and to maximize pt's level of independence in the home and community environment.     Pt's spiritual, cultural and educational needs considered and pt agreeable to plan of care and goals.    Anticipated barriers to physical therapy: chronic pain    Goals: Short Term Goals:  5 weeks  1. Report decreased in pain at worse less than  <   / =  4  /10  to increase tolerance for functional activities. progressing 5/10  2. Pt to increase B popliteal angle by greater than > or = 5 degrees in order to improve  flexibility and posture. Met 8/27/19  3. Increased BLE MMT 1/2  to increase tolerance for ADL and work activities. Met 8/27/19  4. Pt to tolerate HEP to improve ROM and independence with ADL's. Met 8/23/19  5. Pt to improve range of motion by 25% to allow for improved functional mobility to allow for improvement in IADLs.  Met 8/27/19    Long Term Goals: 10 weeks  1. Report decreased in pain at worse less than  <   / =  2  /10  to increase tolerance for functional mobility. progressing  2 .Pt to increase B popliteal angle by greater than > or = 10 degrees in order to improve flexibility and posture. progressing  3. Increased BLE MMT 1 grade to increase tolerance for ADL and work activities.progressing  4. Pt will report at CJ% or less limitation on FOTO Lumbar  to demonstrate decrease in disability and improvement in back pain.progressing  5. Pt to be Independent with HEP to improve ROM and independence with ADL's. Met 10/8/19  6. Pt to demonstrate negative Bridge Test in order to show improved core strength for lumbar stabilization. Met 10/8/19  7. Pt to improve range of motion by 75% to allow for improved functional mobility to allow for improvement in IADLs. progressing  8. Pt will report ability to ambulate 20 minutes without increased lumbar pain.progressing   9. Pt will improve MedX testing score to improve by 20 percent to demonstrate improved motor control and strength.progressing     Plan     Plan of Care Expiration: 11/19/19    Assess tolerance to treatment session and progress per POC. Extend POC to finish healthy back protocol to 11/19/19    Lissette Lundberg, PTA  10/17/2019

## 2019-10-21 ENCOUNTER — PATIENT MESSAGE (OUTPATIENT)
Dept: PAIN MEDICINE | Facility: CLINIC | Age: 69
End: 2019-10-21

## 2019-10-21 RX ORDER — TIZANIDINE 4 MG/1
4 TABLET ORAL EVERY 8 HOURS PRN
Qty: 30 TABLET | Refills: 1 | Status: SHIPPED | OUTPATIENT
Start: 2019-10-21 | End: 2019-11-11 | Stop reason: SDUPTHER

## 2019-10-22 ENCOUNTER — CLINICAL SUPPORT (OUTPATIENT)
Dept: REHABILITATION | Facility: HOSPITAL | Age: 69
End: 2019-10-22
Attending: NURSE PRACTITIONER
Payer: MEDICARE

## 2019-10-22 DIAGNOSIS — R68.89 DECREASED STRENGTH, ENDURANCE, AND MOBILITY: ICD-10-CM

## 2019-10-22 DIAGNOSIS — R53.1 DECREASED STRENGTH, ENDURANCE, AND MOBILITY: ICD-10-CM

## 2019-10-22 DIAGNOSIS — Z74.09 DECREASED STRENGTH, ENDURANCE, AND MOBILITY: ICD-10-CM

## 2019-10-22 DIAGNOSIS — M54.42 ACUTE BILATERAL LOW BACK PAIN WITH BILATERAL SCIATICA: ICD-10-CM

## 2019-10-22 DIAGNOSIS — M25.60 DECREASED RANGE OF MOTION: ICD-10-CM

## 2019-10-22 DIAGNOSIS — M54.41 ACUTE BILATERAL LOW BACK PAIN WITH BILATERAL SCIATICA: ICD-10-CM

## 2019-10-22 PROCEDURE — 97110 THERAPEUTIC EXERCISES: CPT | Mod: PN

## 2019-10-22 NOTE — PROGRESS NOTES
"  Physical Therapy Daily Treatment Note     Name: Luciano Henriquez Jr.    Clinic Number: [id number]    Therapy Diagnosis:   Encounter Diagnoses   Name Primary?    Acute bilateral low back pain with bilateral sciatica     Decreased range of motion     Decreased strength, endurance, and mobility        Physician: Juan Arteaga NP    Visit Date: 10/22/2019    Physician Orders: PT Eval and Treat  Medical Diagnosis from Referral: M54.41,M54.42,G89.29 (ICD-10-CM) - Chronic midline low back pain with bilateral sciatica  Evaluation Date: 7/26/2019  Authorization Period Expiration: 12/31/2019  Plan of Care Certification Period:10/4/2019 -11/19/19 (PN due by 10/26/2019)  Visit #/Visits authorized: 5/7 (Healthy Back visit 18, total visit 25)    Time In: 1130  Time Out: 1230  Total Treatment Time: 50 minutes  Total Billable Time: 25 minutes    Precautions: Standard    Subjective     Pt reports: that he feels good today and has been up for a little while so he is not as stiff. Patient reports he is having his injection tomorrow.     He was compliant with home exercise program.  Response to previous treatment: good, appropriate muscle response  Functional change: less pain    Pain: 2/10  Location: bilateral back      Objective     Tested 9/18/19  Lumbar Flexion 60   Lumbar Extension 0   Lumbar Peak Torque 151   Min Torque 45   Test Percent Below Normative Data 22     Luciano received therapeutic exercises to develop strength, endurance, ROM, flexibility, posture and core stabilization for 50 minutes including:    DKTC on SB x 20   LTR on SB x 20  Supine HS stretch /c strap: 2 x 30" hold ea.  Sit<>stand from 18inch platform x 10     [facility] 10/22/2019   Visit Number 18   VAS Pain Rating 2   Treadmill Time (in min.) 10   Speed 2   Incline 0   Flexion in Lying 20   Manual Therapy 0   Lumbar Weight 78   Repetitions 20   Rating of Perceived Exertion 4   Ice - Z Lie (in min.) 10     Peripheral muscle strengthening which " included 1 set of 15-20 repetitions at a slow, controlled 10-13 second per rep pace focused on strengthening supporting musculature for improved body mechanics and functional mobility.  Pt and therapist focused on proper form during treatment to ensure optimal strengthening of each targeted muscle group.  Machines were utilized including torso rotation, leg extension, leg curl, chest press, upright row, tricep extension, bicep curl, leg press, hip add, and hip abduction.    Luciano received the following manual therapy techniques: none    Home Exercises Provided and Patient Education Provided     Education provided:   - continued compliance with HEP    Written Home Exercises Provided: Patient instructed to cont prior HEP.  Exercises were reviewed and Luciano was able to demonstrate them prior to the end of the session.  Luciano demonstrated good  understanding of the education provided.     See EMR under Patient Instructions for exercises provided prior visit.    Assessment     Patient tolerated treatment session good reporting appropriate muscle response. Good tolerance to MedX Lumbar Extension machine at an increase of 78 ft/lbs. Patient completed 20 repetitions at an RPE of 3. Consider progression of weight next visit. Patient is progressing well with current POC.     Luciano is progressing well towards his goals.   Pt prognosis is Good.     Pt will continue to benefit from skilled outpatient physical therapy to address the deficits listed in the problem list box on initial evaluation, provide pt/family education and to maximize pt's level of independence in the home and community environment.     Pt's spiritual, cultural and educational needs considered and pt agreeable to plan of care and goals.    Anticipated barriers to physical therapy: chronic pain    Goals: Short Term Goals:  5 weeks  1. Report decreased in pain at worse less than  <   / =  4  /10  to increase tolerance for functional activities. progressing  5/10  2. Pt to increase B popliteal angle by greater than > or = 5 degrees in order to improve flexibility and posture. Met 8/27/19  3. Increased BLE MMT 1/2  to increase tolerance for ADL and work activities. Met 8/27/19  4. Pt to tolerate HEP to improve ROM and independence with ADL's. Met 8/23/19  5. Pt to improve range of motion by 25% to allow for improved functional mobility to allow for improvement in IADLs.  Met 8/27/19    Long Term Goals: 10 weeks  1. Report decreased in pain at worse less than  <   / =  2  /10  to increase tolerance for functional mobility. progressing  2 .Pt to increase B popliteal angle by greater than > or = 10 degrees in order to improve flexibility and posture. progressing  3. Increased BLE MMT 1 grade to increase tolerance for ADL and work activities.progressing  4. Pt will report at CJ% or less limitation on FOTO Lumbar  to demonstrate decrease in disability and improvement in back pain.progressing  5. Pt to be Independent with HEP to improve ROM and independence with ADL's. Met 10/8/19  6. Pt to demonstrate negative Bridge Test in order to show improved core strength for lumbar stabilization. Met 10/8/19  7. Pt to improve range of motion by 75% to allow for improved functional mobility to allow for improvement in IADLs. progressing  8. Pt will report ability to ambulate 20 minutes without increased lumbar pain.progressing   9. Pt will improve MedX testing score to improve by 20 percent to demonstrate improved motor control and strength.progressing     Plan     Plan of Care Expiration: 11/19/19    Assess tolerance to treatment session and progress per POC. Extend POC to finish healthy back protocol to 11/19/19    Lissette Lundberg, PTA  10/22/2019

## 2019-10-23 ENCOUNTER — HOSPITAL ENCOUNTER (OUTPATIENT)
Facility: HOSPITAL | Age: 69
Discharge: HOME | End: 2019-10-23
Attending: PAIN MEDICINE | Admitting: PAIN MEDICINE
Payer: MEDICARE

## 2019-10-23 VITALS
HEIGHT: 65 IN | HEART RATE: 57 BPM | RESPIRATION RATE: 20 BRPM | WEIGHT: 200 LBS | DIASTOLIC BLOOD PRESSURE: 72 MMHG | OXYGEN SATURATION: 99 % | BODY MASS INDEX: 33.32 KG/M2 | TEMPERATURE: 98 F | SYSTOLIC BLOOD PRESSURE: 140 MMHG

## 2019-10-23 DIAGNOSIS — M51.36 DDD (DEGENERATIVE DISC DISEASE), LUMBAR: Primary | ICD-10-CM

## 2019-10-23 PROCEDURE — 77003 FLUOROGUIDE FOR SPINE INJECT: CPT | Performed by: PAIN MEDICINE

## 2019-10-23 PROCEDURE — 62323 NJX INTERLAMINAR LMBR/SAC: CPT | Mod: ,,, | Performed by: PAIN MEDICINE

## 2019-10-23 PROCEDURE — 62323 NJX INTERLAMINAR LMBR/SAC: CPT | Performed by: PAIN MEDICINE

## 2019-10-23 PROCEDURE — 25000003 PHARM REV CODE 250: Performed by: PAIN MEDICINE

## 2019-10-23 PROCEDURE — 63600175 PHARM REV CODE 636 W HCPCS: Performed by: PAIN MEDICINE

## 2019-10-23 PROCEDURE — 62322 NJX INTERLAMINAR LMBR/SAC: CPT | Performed by: PAIN MEDICINE

## 2019-10-23 PROCEDURE — 25500020 PHARM REV CODE 255: Performed by: PAIN MEDICINE

## 2019-10-23 PROCEDURE — 99152 PR MOD CONSCIOUS SEDATION, SAME PHYS, 5+ YRS, FIRST 15 MIN: ICD-10-PCS | Mod: ,,, | Performed by: PAIN MEDICINE

## 2019-10-23 PROCEDURE — 62323 PR INJ LUMBAR/SACRAL, W/IMAGING GUIDANCE: ICD-10-PCS | Mod: ,,, | Performed by: PAIN MEDICINE

## 2019-10-23 PROCEDURE — 99152 MOD SED SAME PHYS/QHP 5/>YRS: CPT | Mod: ,,, | Performed by: PAIN MEDICINE

## 2019-10-23 RX ORDER — DEXAMETHASONE SODIUM PHOSPHATE 10 MG/ML
INJECTION INTRAMUSCULAR; INTRAVENOUS
Status: DISCONTINUED
Start: 2019-10-23 | End: 2019-10-23 | Stop reason: HOSPADM

## 2019-10-23 RX ORDER — FENTANYL CITRATE 50 UG/ML
100 INJECTION, SOLUTION INTRAMUSCULAR; INTRAVENOUS
Status: DISCONTINUED | OUTPATIENT
Start: 2019-10-23 | End: 2019-10-23 | Stop reason: HOSPADM

## 2019-10-23 RX ORDER — FENTANYL CITRATE 50 UG/ML
INJECTION, SOLUTION INTRAMUSCULAR; INTRAVENOUS
Status: DISCONTINUED
Start: 2019-10-23 | End: 2019-10-23 | Stop reason: HOSPADM

## 2019-10-23 RX ORDER — SODIUM CHLORIDE 9 MG/ML
INJECTION, SOLUTION INTRAVENOUS CONTINUOUS
Status: DISCONTINUED | OUTPATIENT
Start: 2019-10-23 | End: 2019-10-23 | Stop reason: HOSPADM

## 2019-10-23 RX ORDER — MIDAZOLAM HYDROCHLORIDE 1 MG/ML
5 INJECTION INTRAMUSCULAR; INTRAVENOUS
Status: DISCONTINUED | OUTPATIENT
Start: 2019-10-23 | End: 2019-10-23 | Stop reason: HOSPADM

## 2019-10-23 RX ORDER — LIDOCAINE HYDROCHLORIDE 20 MG/ML
10 INJECTION, SOLUTION INFILTRATION; PERINEURAL ONCE
Status: COMPLETED | OUTPATIENT
Start: 2019-10-23 | End: 2019-10-23

## 2019-10-23 RX ORDER — LIDOCAINE HYDROCHLORIDE 20 MG/ML
INJECTION, SOLUTION INFILTRATION; PERINEURAL
Status: DISCONTINUED
Start: 2019-10-23 | End: 2019-10-23 | Stop reason: HOSPADM

## 2019-10-23 RX ORDER — MIDAZOLAM HYDROCHLORIDE 1 MG/ML
INJECTION INTRAMUSCULAR; INTRAVENOUS
Status: DISCONTINUED
Start: 2019-10-23 | End: 2019-10-23 | Stop reason: HOSPADM

## 2019-10-23 RX ORDER — LIDOCAINE HYDROCHLORIDE 10 MG/ML
INJECTION, SOLUTION EPIDURAL; INFILTRATION; INTRACAUDAL; PERINEURAL
Status: DISCONTINUED
Start: 2019-10-23 | End: 2019-10-23 | Stop reason: HOSPADM

## 2019-10-23 RX ORDER — LIDOCAINE HYDROCHLORIDE 10 MG/ML
1 INJECTION, SOLUTION EPIDURAL; INFILTRATION; INTRACAUDAL; PERINEURAL ONCE
Status: COMPLETED | OUTPATIENT
Start: 2019-10-23 | End: 2019-10-23

## 2019-10-23 RX ORDER — DEXAMETHASONE SODIUM PHOSPHATE 10 MG/ML
10 INJECTION INTRAMUSCULAR; INTRAVENOUS ONCE
Status: COMPLETED | OUTPATIENT
Start: 2019-10-23 | End: 2019-10-23

## 2019-10-23 NOTE — DISCHARGE INSTRUCTIONS

## 2019-10-23 NOTE — OP NOTE
Caudal Epidural Steroid Injection under Fluoroscopy  Time-out taken to identify patient and procedure side prior to starting the procedure.                                                                 PROCEDURE:  Caudal epidural steroid injection under fluoroscopy.    REASON FOR PROCEDURE: Lumbar DDD    PHYSICIAN: Jorge Warner MD  ASSISTANTS: None    MEDICATIONS INJECTED:  Preservative-free dexamethasone 10mg, 3ml of sterile preservative free normal saline, and 5ml of preservative free sterile Xylocaine-MPF 1%.    LOCAL ANESTHETIC GIVEN:  Xylocaine 2% 2ml  SEDATION MEDICATIONS: Versed 1 mg and fentanyl 50 mcg IV.  Conscious sedation provided by MD and monitored by RN.   (See nurse documentation and case log for sedation time)      ESTIMATED BLOOD LOSS:  None.    COMPLICATIONS:  None.    TECHNIQUE:   Laying in the prone position, the patient was prepped and draped in the usual sterile fashion using ChloraPrep and fenestrated drape.  Appropriate anatomic landmarks were determined including the superior LS-spine and sacral hiatus.  Local anesthetic was given by raising a wheel and going down to the periosteum.  A 3.5inch 22-gauge spinal needle was introduced thru the sacral hiatus.  Omnipaque was injected to confirm placement in the appropriate area and that there was no vascular runoff.  The medication was then injected slowly.  The patient tolerated the procedure well.    PAIN BEFORE THE PROCEDURE:  3/10.    PAIN AFTER THE PROCEDURE:  4/10.    The patient was monitored after the procedure.  Patient was given post procedure and discharge instructions to follow at home.  We will see the patient back in two weeks or the patient may call to inform of status. The patient was discharged in a stable condition

## 2019-10-23 NOTE — INTERVAL H&P NOTE
The patient has been examined and the H&P has been reviewed:    I concur with the findings and no changes have occurred since H&P was written.    Anesthesia/Surgery risks, benefits and alternative options discussed and understood by patient/family.    AAOx3 NAD  Mood and affect normal  Memory and language intact  RRR  CTAB        There are no hospital problems to display for this patient.

## 2019-10-29 ENCOUNTER — CLINICAL SUPPORT (OUTPATIENT)
Dept: REHABILITATION | Facility: HOSPITAL | Age: 69
End: 2019-10-29
Attending: NURSE PRACTITIONER
Payer: MEDICARE

## 2019-10-29 DIAGNOSIS — M25.60 DECREASED RANGE OF MOTION: ICD-10-CM

## 2019-10-29 DIAGNOSIS — R53.1 DECREASED STRENGTH, ENDURANCE, AND MOBILITY: ICD-10-CM

## 2019-10-29 DIAGNOSIS — Z74.09 DECREASED STRENGTH, ENDURANCE, AND MOBILITY: ICD-10-CM

## 2019-10-29 DIAGNOSIS — M54.41 ACUTE BILATERAL LOW BACK PAIN WITH BILATERAL SCIATICA: Primary | ICD-10-CM

## 2019-10-29 DIAGNOSIS — M54.42 ACUTE BILATERAL LOW BACK PAIN WITH BILATERAL SCIATICA: Primary | ICD-10-CM

## 2019-10-29 DIAGNOSIS — R68.89 DECREASED STRENGTH, ENDURANCE, AND MOBILITY: ICD-10-CM

## 2019-10-29 PROCEDURE — 97110 THERAPEUTIC EXERCISES: CPT | Mod: PN

## 2019-11-05 ENCOUNTER — CLINICAL SUPPORT (OUTPATIENT)
Dept: REHABILITATION | Facility: HOSPITAL | Age: 69
End: 2019-11-05
Attending: NURSE PRACTITIONER
Payer: MEDICARE

## 2019-11-05 DIAGNOSIS — Z74.09 DECREASED STRENGTH, ENDURANCE, AND MOBILITY: ICD-10-CM

## 2019-11-05 DIAGNOSIS — R53.1 DECREASED STRENGTH, ENDURANCE, AND MOBILITY: ICD-10-CM

## 2019-11-05 DIAGNOSIS — M54.42 ACUTE BILATERAL LOW BACK PAIN WITH BILATERAL SCIATICA: ICD-10-CM

## 2019-11-05 DIAGNOSIS — M25.60 DECREASED RANGE OF MOTION: ICD-10-CM

## 2019-11-05 DIAGNOSIS — R68.89 DECREASED STRENGTH, ENDURANCE, AND MOBILITY: ICD-10-CM

## 2019-11-05 DIAGNOSIS — M54.41 ACUTE BILATERAL LOW BACK PAIN WITH BILATERAL SCIATICA: ICD-10-CM

## 2019-11-05 PROCEDURE — 97110 THERAPEUTIC EXERCISES: CPT | Mod: PN

## 2019-11-05 PROCEDURE — 97750 PHYSICAL PERFORMANCE TEST: CPT | Mod: PN

## 2019-11-05 NOTE — PROGRESS NOTES
"  Physical Therapy Discharge Note     Name: Luciano Henriquez Jr.    Clinic Number: [id number]    Therapy Diagnosis:   Encounter Diagnoses   Name Primary?    Acute bilateral low back pain with bilateral sciatica     Decreased range of motion     Decreased strength, endurance, and mobility        Physician: Juan Arteaga NP    Visit Date: 11/5/2019    Physician Orders: PT Eval and Treat  Medical Diagnosis from Referral: M54.41,M54.42,G89.29 (ICD-10-CM) - Chronic midline low back pain with bilateral sciatica  Evaluation Date: 7/26/2019  Authorization Period Expiration: 12/31/2019  Plan of Care Certification Period:10/4/2019 -11/19/19 Visit #/Visits authorized: 7/7 (Healthy Back visit 20, total visit 27)    Time In: 4:01pm  Time Out: 4:50pm  Total Treatment Time: 29 minutes  Total Billable Time: 29 minutes    Precautions: Standard    Subjective     Pt reports: he has noticed a little improvement after injection but is almost two weeks out and was expecting more relief.     He was compliant with home exercise program.  Response to previous treatment: good, appropriate muscle response  Functional change: less pain    Pain: 3/10  Location: bilateral back      Objective     Tested 9/18/19  Lumbar Flexion 60   Lumbar Extension 0   Lumbar Peak Torque 151   Min Torque 45   Test Percent Below Normative Data 22     Tested 11/5/19- 20th visit  Lumbar Flexion 63   Lumbar Extension 0   Lumbar Peak Torque 146   Min Torque 31   Test Percent Below Normative Data 51     FOTO: 59% limitation     Luciano received therapeutic exercises to develop strength, endurance, ROM, flexibility, posture and core stabilization for 49 minutes including:    Gross BLE MMT: 4+/5  Popliteal Angle: R = 172 degrees ; L = 170 degrees    NP:  DKTC on SB x 20   LTR on SB x 20  Supine HS stretch /c strap: 2 x 30" hold ea.  Sit<>stand from 18inch platform x 10-NP     HealthyBack Therapy 11/5/2019   Visit Number 20   VAS Pain Rating 3   Treadmill Time (in " min.) 10   Speed 2   Femur Restraint 5   Top Dead Center 24   Counterweight 140   Lumbar Flexion 63   Lumbar Extension 0   Lumbar Peak Torque 146   Min Torque 31   Test Percent Below Normative Data 51   Test Percent Gain in Strength from Initial  15   Ice - Z Lie (in min.) 0       Peripheral muscle strengthening which included 1 set of 15-20 repetitions at a slow, controlled 10-13 second per rep pace focused on strengthening supporting musculature for improved body mechanics and functional mobility.  Pt and therapist focused on proper form during treatment to ensure optimal strengthening of each targeted muscle group.  Machines were utilized including torso rotation, leg extension, leg curl, chest press, upright row, tricep extension, bicep curl, leg press, hip add, and hip abduction.    Luciano received the following manual therapy techniques: none    Home Exercises Provided and Patient Education Provided     Education provided:   - continued compliance with HEP  -Wellness program  -Fridge reminder of exercises/stretches  -Handout on Matrix machines    Written Home Exercises Provided: Patient instructed to cont prior HEP.  Exercises were reviewed and Luciano was able to demonstrate them prior to the end of the session.  Luciano demonstrated good  understanding of the education provided.     See EMR under Patient Instructions for exercises provided prior visit.    Assessment     Lucaino was reassessed and isometric strength was tested on [employer] machine today. Improved range of motion 0-63 degrees. Luciano has a strength gain from initial treatment session of 15%. Gross strength bilaterally in LEs improved to 4+/5. Pt reports continued minima low back pain. Improvement in gait, ambulates with more upright posture for >/= 10 minutes. Pt completed Healthy Back protocol and is discharged. Educated on wellness program and given HEP and Fridge handout with instructions on stretches/exercises to complete weekly.     Anticipated  barriers to physical therapy: chronic pain    Goals: Short Term Goals:  5 weeks  1. Report decreased in pain at worse less than  <   / =  4  /10  to increase tolerance for functional activities. Met 11/5/19, 4/10  2. Pt to increase B popliteal angle by greater than > or = 5 degrees in order to improve flexibility and posture. Met 8/27/19  3. Increased BLE MMT 1/2  to increase tolerance for ADL and work activities. Met 8/27/19  4. Pt to tolerate HEP to improve ROM and independence with ADL's. Met 8/23/19  5. Pt to improve range of motion by 25% to allow for improved functional mobility to allow for improvement in IADLs.  Met 8/27/19    Long Term Goals: 10 weeks  1. Report decreased in pain at worse less than  <   / =  2  /10  to increase tolerance for functional mobility. progressing  2 .Pt to increase B popliteal angle by greater than > or = 10 degrees in order to improve flexibility and posture. Met 11/5/19  3. Increased BLE MMT 1 grade to increase tolerance for ADL and work activities.Met 11/5/19  4. Pt will report at CJ% or less limitation on FOTO Lumbar  to demonstrate decrease in disability and improvement in back pain. Not met  5. Pt to be Independent with HEP to improve ROM and independence with ADL's. Met 10/8/19  6. Pt to demonstrate negative Bridge Test in order to show improved core strength for lumbar stabilization. Met 10/8/19  7. Pt to improve range of motion by 75% to allow for improved functional mobility to allow for improvement in IADLs. progressing  8. Pt will report ability to ambulate 20 minutes without increased lumbar pain.progressing   9. Pt will improve MedX testing score to improve by 20 percent to demonstrate improved motor control and strength. Not met, improved 15%     Plan     Pt is discharged from PT.       Stacie Sharma, PT  11/5/2019

## 2019-11-07 ENCOUNTER — PATIENT OUTREACH (OUTPATIENT)
Dept: OTHER | Facility: OTHER | Age: 69
End: 2019-11-07

## 2019-11-07 NOTE — PROGRESS NOTES
Digital Medicine: Health  Follow-Up      Follow Up  Follow-up reason(s): reading review and routine education      Readings are trending down Pt has been experiencing more pain recently from degenerative disc disease. He received a spinal epidural 2 weeks ago to help with the pain.     INTERVENTION(S)  encouragement/support, denied resources and denied questions    PLAN  continue monitoring    There are no preventive care reminders to display for this patient.    Last 5 Patient Entered Readings                                      Current 30 Day Average: 129/78     Recent Readings 11/5/2019 10/30/2019 10/24/2019 10/24/2019 10/15/2019    SBP (mmHg) 116 124 144 157 122    DBP (mmHg) 73 77 78 87 77    Pulse 63 65 59 60 62            Diet Screening   No change to diet.      Pt's wife, who is also in [facility], does most of the cooking and is mindful of salt use.     Physical Activity Screening   When asked if exercising, patient responded: yes  Patient has limited mobility: recovering from surgery/rehab  Patient has the following chronic pain: back pain    He exercises for 60 minutes per day 2 day(s) a week.  His level of intensity when exercising is moderate.    Patient participates in the following activities: physical therapy/rehab    He identified the following barriers to physical activity: pain/injury/recent surgery      SDOH: Deferred.

## 2019-11-08 NOTE — PROGRESS NOTES
Digital Medicine: Clinician Follow-Up    The history is provided by the spouse.     Follow Up  Follow-up reason(s): reading review      Readings are trending down due to tizanidine.  Mrs. Henriquez spoke to me regarding Mr. Henriquez. Patient had a back procedure on 10/23 that he continues to recover from. He has been taking tizanidine since this time. Patient and his wife are aware that muscle relaxers can cause lower BP.           Per 30 day average, 129/78 mmHg, patient's BP is not at goal.     Patients BP is lower currently. If readings begin to trend up once he completes course of tizanidine, will discuss changing amlodipine to nifedipine.     Will continue to monitor regularly. Will follow up in 4-6 weeks, sooner if BP begins to trend upward or downward.    Asked patient to call or message with questions or concerns.         Last 5 Patient Entered Readings                                      Current 30 Day Average: 129/78     Recent Readings 11/5/2019 10/30/2019 10/24/2019 10/24/2019 10/15/2019    SBP (mmHg) 116 124 144 157 122    DBP (mmHg) 73 77 78 87 77    Pulse 63 65 59 60 62             Hypertension Medications             amLODIPine (NORVASC) 10 MG tablet Take 1 tablet (10 mg total) by mouth once daily.    chlorthalidone (HYGROTEN) 25 MG Tab Take 1 tablet (25 mg total) by mouth once daily.    olmesartan (BENICAR) 40 MG tablet Take 1 tablet (40 mg total) by mouth once daily.                 Screenings

## 2019-11-11 ENCOUNTER — OFFICE VISIT (OUTPATIENT)
Dept: PAIN MEDICINE | Facility: CLINIC | Age: 69
End: 2019-11-11
Payer: MEDICARE

## 2019-11-11 VITALS
HEIGHT: 65 IN | SYSTOLIC BLOOD PRESSURE: 118 MMHG | DIASTOLIC BLOOD PRESSURE: 69 MMHG | OXYGEN SATURATION: 96 % | BODY MASS INDEX: 34.24 KG/M2 | TEMPERATURE: 98 F | WEIGHT: 205.5 LBS | HEART RATE: 69 BPM

## 2019-11-11 DIAGNOSIS — M48.062 SPINAL STENOSIS OF LUMBAR REGION WITH NEUROGENIC CLAUDICATION: ICD-10-CM

## 2019-11-11 DIAGNOSIS — M54.16 LUMBAR RADICULOPATHY: ICD-10-CM

## 2019-11-11 DIAGNOSIS — M47.816 LUMBAR SPONDYLOSIS: ICD-10-CM

## 2019-11-11 DIAGNOSIS — M51.36 DDD (DEGENERATIVE DISC DISEASE), LUMBAR: Primary | ICD-10-CM

## 2019-11-11 PROCEDURE — 99999 PR PBB SHADOW E&M-EST. PATIENT-LVL III: ICD-10-PCS | Mod: PBBFAC,,, | Performed by: PAIN MEDICINE

## 2019-11-11 PROCEDURE — 99214 OFFICE O/P EST MOD 30 MIN: CPT | Mod: S$PBB,,, | Performed by: PAIN MEDICINE

## 2019-11-11 PROCEDURE — 99214 PR OFFICE/OUTPT VISIT, EST, LEVL IV, 30-39 MIN: ICD-10-PCS | Mod: S$PBB,,, | Performed by: PAIN MEDICINE

## 2019-11-11 PROCEDURE — 99213 OFFICE O/P EST LOW 20 MIN: CPT | Mod: PBBFAC,PN | Performed by: PAIN MEDICINE

## 2019-11-11 PROCEDURE — 99999 PR PBB SHADOW E&M-EST. PATIENT-LVL III: CPT | Mod: PBBFAC,,, | Performed by: PAIN MEDICINE

## 2019-11-11 RX ORDER — TIZANIDINE 4 MG/1
4 TABLET ORAL EVERY 8 HOURS PRN
Qty: 90 TABLET | Refills: 5 | Status: SHIPPED | OUTPATIENT
Start: 2019-11-11 | End: 2020-05-09

## 2019-11-11 NOTE — PROGRESS NOTES
Subjective:     Patient ID: Luciano Henriquez Jr. is a 69 y.o. male    Chief Complaint: Pain      Referred by: No ref. provider found      HPI:    Interval History (11/11/19):  He returns today for follow up.  He reports that caudal epidural steroid injection has not been helpful for the low back and lower extremity pain. He reports that got 1 day of near complete relief but after this day his pain gradually returned.  Currently he only reports 10-15% improvement from his baseline.  He denies any changes in the quality or location of his pain.  He denies any new or worsening symptoms.      Interval History (9/27/19):  He returns today for follow up and MRI review.  He reports that his pain is unchanged in quality location since last encounter.  He denies any new or worsening symptoms.      Initial Encounter (9/13/19):  Luciano Henriquez Jr. is a 69 y.o. male who presents today with chronic bilateral low back pain. Onset of pain was gradual without any specific inciting events or injuries.  Patient localizes the pain across the lower lumbar region.  The pain does radiate to the bilateral buttocks and thighs.  He denies any associated numbness, tingling, weakness, bowel bladder dysfunction.  The pain is intermittent and worsened with activity.  Patient states that he can walk 50-60 yd before the pain becomes bothersome.  The pain typically improves after sitting for 1-2 minutes.   This pain is described in detail below.    Physical Therapy:  Yes.  Somewhat helpful.    Non-pharmacologic Treatment:  Rest helps         · TENS?  No    Pain Medications:         · Currently taking:  Ibuprofen, tizanidine    · Has tried in the past:      · Has not tried:  Opioids, , Tylenol, TCAs, SNRIs, anticonvulsants, topical creams    Blood thinners:  Aspirin    Interventional Therapies:    10/23/19 - caudal epidural steroid injection - 1 day of near complete relief    Relevant Surgeries:  None    Affecting sleep?  No    Affecting  daily activities? yes    Depressive symptoms? no          · SI/HI? No    Work status: Retried    Pain Scores:    Best:       2/10  Worst:    8/10  Usually:   4/10  Today:  4/10    Review of Systems   Constitutional: Negative for activity change, appetite change, chills, fatigue, fever and unexpected weight change.   HENT: Negative for hearing loss.    Eyes: Negative for visual disturbance.   Respiratory: Negative for chest tightness and shortness of breath.    Cardiovascular: Negative for chest pain.   Gastrointestinal: Negative for abdominal pain, constipation, diarrhea, nausea and vomiting.   Genitourinary: Negative for difficulty urinating.   Musculoskeletal: Positive for arthralgias, back pain, gait problem and myalgias. Negative for neck pain.   Skin: Negative for rash.   Neurological: Negative for dizziness, weakness, light-headedness, numbness and headaches.   Psychiatric/Behavioral: Negative for hallucinations, sleep disturbance and suicidal ideas. The patient is not nervous/anxious.        Past Medical History:   Diagnosis Date    Allergy     Hyperlipidemia     Hypertension     Reflux esophagitis        Past Surgical History:   Procedure Laterality Date    arthroscopic on left knee      CARPAL TUNNEL RELEASE      right hand only    deviated septum repair      EPIDURAL STEROID INJECTION N/A 10/23/2019    Procedure: Injection, Steroid, Epidural Caudal;  Surgeon: Jorge Warner Jr., MD;  Location: [facility];  Service: Pain Management;  Laterality: N/A;  Caudal BETO    [id number]    Arrive @ 0800 (morning request); IV Sedation- Fasting; ASA last 10/15; No DM    HERNIA REPAIR Left     inguinal    VASECTOMY         Social History     Socioeconomic History    Marital status:      Spouse name: Not on file    Number of children: Not on file    Years of education: Not on file    Highest education level: Not on file   Occupational History    Not on file   Social Needs    Financial resource  strain: Not on file    Food insecurity:     Worry: Not on file     Inability: Not on file    Transportation needs:     Medical: Not on file     Non-medical: Not on file   Tobacco Use    Smoking status: Never Smoker    Smokeless tobacco: Never Used    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     Substance and Sexual Activity    Alcohol use: Yes     Alcohol/week: 14.0 standard drinks     Types: 14 Glasses of wine per week     Comment: Drinks 2 glasses of wine per night    Drug use: No    Sexual activity: Yes     Partners: Female   Lifestyle    Physical activity:     Days per week: Not on file     Minutes per session: Not on file    Stress: Not on file   Relationships    Social connections:     Talks on phone: Not on file     Gets together: Not on file     Attends Buddhist service: Not on file     Active member of club or organization: Not on file     Attends meetings of clubs or organizations: Not on file     Relationship status: Not on file   Other Topics Concern    Not on file   Social History Narrative    Not on file       Review of patient's allergies indicates:  No Known Allergies    Current Outpatient Medications on File Prior to Visit   Medication Sig Dispense Refill    albuterol (VENTOLIN HFA) 90 mcg/actuation inhaler Inhale 2 puffs into the lungs every 6 (six) hours as needed for Wheezing or Shortness of Breath. Rescue 18 g 0    amitriptyline (ELAVIL) 100 MG tablet       amLODIPine (NORVASC) 10 MG tablet Take 1 tablet (10 mg total) by mouth once daily. 90 tablet 3    ascorbic acid (VITAMIN C) 500 MG tablet Take 500 mg by mouth 2 (two) times daily.      aspirin (ECOTRIN) 81 MG EC tablet Take 81 mg by mouth once daily.      azelastine (ASTELIN) 137 mcg (0.1 %) nasal spray 1 spray (137 mcg total) by Nasal route 2 (two) times daily. 30 mL 1    BOOSTRIX TDAP 2.5-8-5 Lf-mcg-Lf/0.5mL Syrg injection       chlorthalidone (HYGROTEN) 25 MG Tab Take 1 tablet (25 mg total)  "by mouth once daily. 90 tablet 3    doxepin (ZONALON) 5 % cream       fish oil-omega-3 fatty acids 300-1,000 mg capsule Take 2 g by mouth once daily.      multivitamin capsule Take 1 capsule by mouth once daily.      olmesartan (BENICAR) 40 MG tablet Take 1 tablet (40 mg total) by mouth once daily. 90 tablet 3    rosuvastatin (CRESTOR) 10 MG tablet Take 1 tablet (10 mg total) by mouth every evening. 90 tablet 3    sucralfate (CARAFATE) 1 gram tablet Take 1 tablet (1 g total) by mouth 4 (four) times daily before meals and nightly. 360 tablet 2    [DISCONTINUED] tiZANidine (ZANAFLEX) 4 MG tablet Take 1 tablet (4 mg total) by mouth every 8 (eight) hours as needed. 30 tablet 1     No current facility-administered medications on file prior to visit.        Objective:      /69   Pulse 69   Temp 98.4 °F (36.9 °C) (Oral)   Ht 5' 5" (1.651 m)   Wt 93.2 kg (205 lb 8 oz)   SpO2 96%   BMI 34.20 kg/m²     Exam:  GEN:  Well developed, well nourished.  No acute distress.  Normal pain behavior.  HEENT:  No trauma.  Mucous membranes moist.  Nares patent bilaterally.  PSYCH: Normal affect. Thought content appropriate.  CHEST:  Breathing symmetric.  No audible wheezing.  ABD: Soft, non-distended.  SKIN:  Warm, pink, dry.  No rash on exposed areas.    EXT:  No cyanosis, clubbing, or edema.  No color change or changes in nail or hair growth.  NEURO/MUSCULOSKELETAL:  Fully alert, oriented, and appropriate. Speech normal yair. No cranial nerve deficits.   Gait:  Normal.  No trendelenburg sign bilaterally.   Motor Strength: 5/5 motor strength throughout lower extremities.   Sensory:  No sensory deficit in the lower extremities.   Reflexes:  2 + and symmetric throughout.  Downgoing Babinski's bilaterally.  No clonus or spasticity.  L-Spine:  Limited ROM with pain on extension. Negative pain with axial/facet loading bilaterally.  Negative SLR bilaterally.    No TTP over lumbar paraspinals, bilateral SI joints, hips, " piriformis muscles, or GTB.            Imaging:  Outside lumbar MRI done on 9/24/19:    L2-3:  Mild spinal central stenosis secondary to disc bulge and facet and ligamentum flavum hypertrophy.  Right greater than left moderate foraminal narrowing.    L3-4:  Moderate central stenosis secondary to disc bulge and facet and ligamentum flavum hypertrophy.  Moderate right and moderate to severe left foraminal narrowing with probable but min of both exiting L3 nerve roots.    L4-5:  Mild central stenosis secondary to disc bulge and facet and ligamentum flavum hypertrophy.  Moderate to severe bilateral foraminal narrowing with probable abutment of both exiting L4 nerve roots.    L5-S1:  Slight indentation of the thecal sac secondary to disc bulge.  Conceivable impingement of the transitioning S1 nerve roots.  Moderate to severe left> right foraminal stenosis        Assessment:       Encounter Diagnoses   Name Primary?    DDD (degenerative disc disease), lumbar Yes    Lumbar spondylosis     Spinal stenosis of lumbar region with neurogenic claudication     Lumbar radiculopathy          Plan:       Luciano was seen today for pain.    Diagnoses and all orders for this visit:    DDD (degenerative disc disease), lumbar  -     tiZANidine (ZANAFLEX) 4 MG tablet; Take 1 tablet (4 mg total) by mouth every 8 (eight) hours as needed.  -     Case request GI: Injection, Steroid, Epidural Caudal    Lumbar spondylosis  -     tiZANidine (ZANAFLEX) 4 MG tablet; Take 1 tablet (4 mg total) by mouth every 8 (eight) hours as needed.    Spinal stenosis of lumbar region with neurogenic claudication  -     tiZANidine (ZANAFLEX) 4 MG tablet; Take 1 tablet (4 mg total) by mouth every 8 (eight) hours as needed.    Lumbar radiculopathy  -     Case request GI: Injection, Steroid, Epidural Caudal        Luciano Delarosadeysi Gonzalez. is a 69 y.o. male with chronic bilateral low back pain that radiates the bilateral lower extremities.  Exact etiology of pain  of unknown though I do have suspicion for neurogenic claudication/spinal stenosis.  Low suspicion for facet or sacroiliac mediated pain. Low suspicion for radiculopathy.  Disc space narrowing noted on lumbar x-rays with significant lower lumbar facet arthritis.  Multilevel foraminal and central stenosis noted on lumbar MRI.  Good relief for 1 day following caudal epidural steroid injection.    1.  Schedule for repeat caudal epidural steroid injection.  2.  Continue physical therapy/home exercise program.  3.  Return to clinic 2 weeks after procedure to discuss results.  If limited relief, will consider neurosurgery referral.  Patient may be a good candidate for spinal cord stimulator.

## 2019-11-11 NOTE — PROGRESS NOTES
Mr. Henriquez will be scheduled for a Caudal BETO on 11/27/2019.  Reviewed the pre-procedure instructions listed below with him- copy also provided.  Instructed patient to notify clinic if he begins feeling ill, runs a fever, is prescribed antibiotics, and/or has any outpatient procedures within the two weeks leading up to this procedure.  Instructed patient to report to [facility], 2nd Floor Endoscopy Registration Desk.  All questions answered- patient verbalized understanding.  Clearance previously received to hold ASA x7 days.    Day of Procedure  - Ensure you have obtained an arrival time from the Pain Management Staff  o Procedure Area will call 1-3 days in advance with your arrival time.  Please check any voicemails received.  o If you arrive past your scheduled procedure time, you may be asked to reschedule your procedure.  - Ensure you have a  with you to remain present throughout your procedure for your safety.  o If you arrive without a responsible adult to stay with you and drive you home, you may be asked to reschedule your procedure.  - Take all of your prescribed medications (exceptions noted below) with a small amount of water  - This IS a fasting procedure: Please do not eat for six (6) hours before your scheduled arrival time.  o You may have clear liquids for up to three (3) hours before your arrival time.    - Clear liquids include: water, fruit juices without pulp, clear tea, and black coffee (no sugar and/or creamer/milk)  - Wear comfortable clothing (loose fitting pants).  - You may wear glasses, dentures, contact lenses, and/or hearing aids. Please remove all jewelry and metal hairpins.  - Notify the nurse during the intake process if you are allergic to any medications, if you are diabetic, or if you are not feeling well  - Contact the Pain Management Clinic with the following:  o A fever greater than 100° (degrees)   o Feel ill, have any type of infection, or are taking  antibiotics now or within the two (2) weeks leading up to this procedure  o Have any outpatient procedures within the two (2) weeks leading up to this procedure (colonoscopy, major dental work, etc.)    IF you are taking blood thinners: Only upon receiving clearance and notification from the Pain Management Department  7 Days Prior to Your Procedure:  - Stop taking Plavix/Clopridogrel, Effient/Prasugel, ASA  5 Days Prior to Your Procedure:  - Stop taking Coumadin/Warfarin.  An INR may be drawn prior to your procedure.  If labs are required, you will need to arrive earlier than your scheduled arrival time.  - Stop taking Pradaxa/Dabigatra,  Brilinta/ Ticagrelor  3 Days Prior to Your Procedure:  - Stop taking Xarelto/Rivaroxaban, Eliquis/Apixaban, Aggrenox/Dipyridamole, Reopro/Abciximab

## 2019-11-14 ENCOUNTER — DOCUMENTATION ONLY (OUTPATIENT)
Dept: REHABILITATION | Facility: HOSPITAL | Age: 69
End: 2019-11-14
Attending: NURSE PRACTITIONER
Payer: MEDICARE

## 2019-11-14 NOTE — PROGRESS NOTES
Health  Wellness Visit Note    Name: Luciano Henriquez Jr.  Clinic Number: [id number]  Physician: Juan Arteaga NP  Diagnosis: No diagnosis found.  Past Medical History:   Diagnosis Date    Allergy     Hyperlipidemia     Hypertension     Reflux esophagitis      Visit Number: 21  Precautions: DM      1st PT visit:  7/6/19  Year of care end date:  7/6/20  6 Month test  performed:  12 Month test performed:  Mind body plan: 2 free visits  Patient level: level a    Time In: 2:00 pm  Time Out: 3:00 pm  Total Treatment Time: 60    Subjective:   Patient reports he is going for another steroid injection.  He saw Dr. Warner.  HE wants to continue wellness to maintain strength.   He is stretching at home.   Mild back pain today.    Objective:   Luciano completed therapeutic stretches (LTR, ham stretch, piriformis stretch, LAVELLE) and the following [employer] exercise machines: core lumbar, torso rotation l/r, leg extension, leg curl, upright row, chest press, biceps curl, triceps extension, leg press, hip abd, and hip add    See exercise log in patient folder for rate of exertion and repetitions completed.     Fitness Machine Education Key:  E-education on equipment initiated and further follow up and education needed  I-independent with  and exercise    Discussed but set up teaching not yet started    Lumbarl Ext.  Torso Rotation  Leg Press    Leg Extension  Seated Leg Curl  Chest Press    Seated Row  Hip ADD  Hip ABD    Triceps Extension  Bicep Curl  Other:        Assessment:   Patient tolerated visit very well and wants to continue wellness to maintain strength.    Plan:  Continue with established plan of care towards wellness goals.     For health  : Shira Logan, PT  11/14/2019

## 2019-11-18 ENCOUNTER — DOCUMENTATION ONLY (OUTPATIENT)
Dept: REHABILITATION | Facility: HOSPITAL | Age: 69
End: 2019-11-18
Attending: NURSE PRACTITIONER
Payer: MEDICARE

## 2019-11-18 NOTE — PATIENT INSTRUCTIONS
"                                                                                                                                                                                      HEALTHY BACK TOOLS            KEEP YOUR SPINE FEELING FINE                          HEALTHY HABITS    Do your "go to" stretches 2/day                                   Get a good night's rest  Watch your POSTURE in sitting and standing             Drink PLENTY of water  Use a lumbar roll                                                          Eat LOTS of fruits & vegetables  Get up often (walk and stretch)                                    Manage your stress  Make your workplace IDEAL for you                           Don't smoke  Lift correctly                                                                 Exercise                                                       WHAT TO DO WHEN SYMPTOMS FLARE UP    Back and neck pain may occasionally flare up.   When  this happens,   Remember to manage your symptoms using your tools.  Be encouraged,   flare ups will usually pass.    "Go To" Stretches                            Keep Moving    "Go To" positions                            Slowly resume normal activities    Z Lie                                                   Relaxation techniques  Ice                                                     MY EXERCISE PLAN    Go To Stretches  (2/day)  Knees to chest with ball, pelvic tilts  Lower trunk rotations, hamstring stretches               Strengthening (2-3/week)  Bridges, sit to stand, clams  Wellness 2/week    Cardio (3-5/week)  150 minutes/week                                                                                                                                                                                        "

## 2019-11-18 NOTE — PROGRESS NOTES
Health  Wellness Visit Note    Name: Luciano Henriquez Jr.  Clinic Number: [id number]  Physician: Juan Arteaga NP  Diagnosis: No diagnosis found.  Past Medical History:   Diagnosis Date    Allergy     Hyperlipidemia     Hypertension     Reflux esophagitis      Visit Number: 22  Precautions: DM      1st PT visit:  7/6/19  Year of care end date:  7/6/20  6 Month test  performed:  12 Month test performed:  Mind body plan: 2 free visits  Patient level: level a    Time In: 10:30 am  Time Out: 11:30 am  Total Treatment Time: 60    Subjective:   Patient reports he is going for another steroid injection.  He saw Dr. Warner.  HE wants to continue wellness to maintain strength.   He is stretching at home.   Mild back pain today.  He felt better after stretching.    Objective:   Luciano completed therapeutic stretches (LTR, ham stretch, piriformis stretch, LAVELLE) and the following [employer] exercise machines: core lumbar, torso rotation l/r, leg extension, leg curl, upright row, chest press, biceps curl, triceps extension, leg press, hip abd, and hip add    See exercise log in patient folder for rate of exertion and repetitions completed.     Fitness Machine Education Key:  E-education on equipment initiated and further follow up and education needed  I-independent with  and exercise    Discussed but set up teaching not yet started    Lumbarl Ext. E Torso Rotation E Leg Press E   Leg Extension E Seated Leg Curl E Chest Press E   Seated Row E Hip ADD E Hip ABD E   Triceps Extension E Bicep Curl E Other:        Assessment:   Patient tolerated visit very well and wants to continue wellness to maintain strength.    Plan:  Continue with established plan of care towards wellness goals.     For health  : Shira Logan, PT  11/18/2019

## 2019-11-19 ENCOUNTER — TELEPHONE (OUTPATIENT)
Dept: PAIN MEDICINE | Facility: CLINIC | Age: 69
End: 2019-11-19

## 2019-11-19 NOTE — TELEPHONE ENCOUNTER
Informed Mr. Henriquez that today will be the last dose of ASA until after the procedure scheduled next Wednesday.

## 2019-11-21 ENCOUNTER — TELEPHONE (OUTPATIENT)
Dept: PAIN MEDICINE | Facility: CLINIC | Age: 69
End: 2019-11-21

## 2019-11-21 NOTE — TELEPHONE ENCOUNTER
Reviewed pre-procedure instructions with Mr. Henriquez, as well as provided arrival time of 0830.  All questions answered- patient verbalized understanding.

## 2019-11-25 ENCOUNTER — DOCUMENTATION ONLY (OUTPATIENT)
Dept: REHABILITATION | Facility: HOSPITAL | Age: 69
End: 2019-11-25
Attending: NURSE PRACTITIONER
Payer: MEDICARE

## 2019-11-27 ENCOUNTER — HOSPITAL ENCOUNTER (OUTPATIENT)
Facility: HOSPITAL | Age: 69
Discharge: HOME | End: 2019-11-27
Attending: PAIN MEDICINE | Admitting: PAIN MEDICINE
Payer: MEDICARE

## 2019-11-27 VITALS
OXYGEN SATURATION: 95 % | HEART RATE: 62 BPM | SYSTOLIC BLOOD PRESSURE: 136 MMHG | TEMPERATURE: 98 F | DIASTOLIC BLOOD PRESSURE: 72 MMHG | RESPIRATION RATE: 18 BRPM

## 2019-11-27 DIAGNOSIS — M51.36 DDD (DEGENERATIVE DISC DISEASE), LUMBAR: Primary | ICD-10-CM

## 2019-11-27 PROCEDURE — 99152 PR MOD CONSCIOUS SEDATION, SAME PHYS, 5+ YRS, FIRST 15 MIN: ICD-10-PCS | Mod: ,,, | Performed by: PAIN MEDICINE

## 2019-11-27 PROCEDURE — 62323 NJX INTERLAMINAR LMBR/SAC: CPT | Performed by: PAIN MEDICINE

## 2019-11-27 PROCEDURE — 63600175 PHARM REV CODE 636 W HCPCS: Performed by: PAIN MEDICINE

## 2019-11-27 PROCEDURE — 62322 NJX INTERLAMINAR LMBR/SAC: CPT | Performed by: PAIN MEDICINE

## 2019-11-27 PROCEDURE — 77002 NEEDLE LOCALIZATION BY XRAY: CPT | Performed by: PAIN MEDICINE

## 2019-11-27 PROCEDURE — 99152 MOD SED SAME PHYS/QHP 5/>YRS: CPT | Mod: ,,, | Performed by: PAIN MEDICINE

## 2019-11-27 PROCEDURE — 62323 NJX INTERLAMINAR LMBR/SAC: CPT | Mod: ,,, | Performed by: PAIN MEDICINE

## 2019-11-27 PROCEDURE — 25500020 PHARM REV CODE 255: Performed by: PAIN MEDICINE

## 2019-11-27 PROCEDURE — 25000003 PHARM REV CODE 250: Performed by: PAIN MEDICINE

## 2019-11-27 PROCEDURE — 62323 PR INJ LUMBAR/SACRAL, W/IMAGING GUIDANCE: ICD-10-PCS | Mod: ,,, | Performed by: PAIN MEDICINE

## 2019-11-27 RX ORDER — FENTANYL CITRATE 50 UG/ML
100 INJECTION, SOLUTION INTRAMUSCULAR; INTRAVENOUS
Status: DISCONTINUED | OUTPATIENT
Start: 2019-11-27 | End: 2019-11-27 | Stop reason: HOSPADM

## 2019-11-27 RX ORDER — DEXAMETHASONE SODIUM PHOSPHATE 10 MG/ML
INJECTION INTRAMUSCULAR; INTRAVENOUS
Status: DISCONTINUED
Start: 2019-11-27 | End: 2019-11-27 | Stop reason: HOSPADM

## 2019-11-27 RX ORDER — MIDAZOLAM HYDROCHLORIDE 1 MG/ML
5 INJECTION INTRAMUSCULAR; INTRAVENOUS
Status: DISCONTINUED | OUTPATIENT
Start: 2019-11-27 | End: 2019-11-27 | Stop reason: HOSPADM

## 2019-11-27 RX ORDER — MIDAZOLAM HYDROCHLORIDE 1 MG/ML
INJECTION INTRAMUSCULAR; INTRAVENOUS
Status: DISCONTINUED
Start: 2019-11-27 | End: 2019-11-27 | Stop reason: HOSPADM

## 2019-11-27 RX ORDER — LIDOCAINE HYDROCHLORIDE 10 MG/ML
1 INJECTION, SOLUTION EPIDURAL; INFILTRATION; INTRACAUDAL; PERINEURAL ONCE
Status: COMPLETED | OUTPATIENT
Start: 2019-11-27 | End: 2019-11-27

## 2019-11-27 RX ORDER — LIDOCAINE HYDROCHLORIDE 20 MG/ML
10 INJECTION, SOLUTION INFILTRATION; PERINEURAL ONCE
Status: COMPLETED | OUTPATIENT
Start: 2019-11-27 | End: 2019-11-27

## 2019-11-27 RX ORDER — LIDOCAINE HYDROCHLORIDE 20 MG/ML
INJECTION, SOLUTION INFILTRATION; PERINEURAL
Status: DISCONTINUED
Start: 2019-11-27 | End: 2019-11-27 | Stop reason: HOSPADM

## 2019-11-27 RX ORDER — SODIUM CHLORIDE 9 MG/ML
INJECTION, SOLUTION INTRAVENOUS CONTINUOUS
Status: DISCONTINUED | OUTPATIENT
Start: 2019-11-27 | End: 2019-11-27 | Stop reason: HOSPADM

## 2019-11-27 RX ORDER — FENTANYL CITRATE 50 UG/ML
INJECTION, SOLUTION INTRAMUSCULAR; INTRAVENOUS
Status: DISCONTINUED
Start: 2019-11-27 | End: 2019-11-27 | Stop reason: HOSPADM

## 2019-11-27 RX ORDER — DEXAMETHASONE SODIUM PHOSPHATE 10 MG/ML
10 INJECTION INTRAMUSCULAR; INTRAVENOUS ONCE
Status: COMPLETED | OUTPATIENT
Start: 2019-11-27 | End: 2019-11-27

## 2019-11-27 RX ORDER — LIDOCAINE HYDROCHLORIDE 10 MG/ML
INJECTION, SOLUTION EPIDURAL; INFILTRATION; INTRACAUDAL; PERINEURAL
Status: DISCONTINUED
Start: 2019-11-27 | End: 2019-11-27 | Stop reason: HOSPADM

## 2019-11-27 NOTE — OP NOTE
Caudal Epidural Steroid Injection under Fluoroscopy  Time-out taken to identify patient and procedure side prior to starting the procedure.                                                                 PROCEDURE:  Caudal epidural steroid injection under fluoroscopy.    REASON FOR PROCEDURE: Lumbar DDD    PHYSICIAN: Jorge Warner MD  ASSISTANTS: None    MEDICATIONS INJECTED:  Preservative-free dexamethasone 10mg, 2ml of sterile preservative free normal saline, and 5ml of preservative free sterile Xylocaine-MPF 1%. (only 2 cc of medication injected 2/2 pain)    LOCAL ANESTHETIC GIVEN:  Xylocaine 2% 9ml with Sodium Bicarbonate 1ml  SEDATION MEDICATIONS: Versed 1 mg and fentanyl 50 mcg IV.  Conscious sedation provided by MD and monitored by RN.   (See nurse documentation and case log for sedation time)      ESTIMATED BLOOD LOSS:  None.    COMPLICATIONS:  None.    TECHNIQUE:   Laying in the prone position, the patient was prepped and draped in the usual sterile fashion using ChloraPrep and fenestrated drape.  Appropriate anatomic landmarks were determined including the superior LS-spine and sacral hiatus.  Local anesthetic was given by raising a wheel and going down to the periosteum.  A 3.5inch 22-gauge spinal needle was introduced thru the sacral hiatus.  Omnipaque was injected to confirm placement in the appropriate area and that there was no vascular runoff.  The medication was then injected slowly.  The patient did not tolerate injection 2/2 pain. Only 2.0cc of medication injected.       The patient was monitored after the procedure.  Patient was given post procedure and discharge instructions to follow at home.  We will see the patient back in two weeks or the patient may call to inform of status. The patient was discharged in a stable condition

## 2019-11-27 NOTE — DISCHARGE INSTRUCTIONS
Home Care Instructions Pain Management:    1. DIET:   You may resume your normal diet today.   2. BATHING:   You may shower with luke warm water.  3. DRESSING:   You may remove your bandage today.   4. ACTIVITY LEVEL:   You may resume your normal activities 24 hrs after your procedure.  5. MEDICATIONS:   You may resume your normal medications today.   6. SPECIAL INSTRUCTIONS:   No heat to the injection site for 24 hrs including, bath or shower, heating pad, moist heat, or hot tubs.    Use ice pack to injection site for any pain or discomfort.  Apply ice packs for 20 minute intervals as needed.   If you have received any sedatives by mouth today you may not drive for 12 hours.    If you have received any sedation through your IV, you may not drive for 24 hrs.     PLEASE CALL YOUR DOCTOR IF:  1. Redness or swelling around the injection site.  2. Fever of 101 degrees  3. Drainage (pus) from the injection site.  4. For any continuous bleeding (some dried blood over the incision is normal.)    FOR EMERGENCIES:   If any unusual problems or difficulties occur during clinic hours, call [phone] or [phone].       Fall Prevention  Millions of people fall every year and injure themselves. You may have had anesthesia or sedation which may increase your risk of falling. You may have health issues that put you at an increased risk of falling.     Here are ways to reduce your risk of falling.  ·   · Make your home safe by keeping walkways clear of objects you may trip over.  · Use non-slip pads under rugs. Do not use area rugs or small throw rugs.  · Use non-slip mats in bathtubs and showers.  · Install handrails and lights on staircases.  · Do not walk in poorly lit areas.  · Do not stand on chairs or wobbly ladders.  · Use caution when reaching overhead or looking upward. This position can cause a loss of balance.  · Be sure your shoes fit properly, have non-slip bottoms and are in good condition.   · Wear shoes both inside and  out. Avoid going barefoot or wearing slippers.  · Be cautious when going up and down stairs, curbs, and when walking on uneven sidewalks.  · If your balance is poor, consider using a cane or walker.  · If your fall was related to alcohol use, stop or limit alcohol intake.   · If your fall was related to use of sleeping medicines, talk to your doctor about this. You may need to reduce your dosage at bedtime if you awaken during the night to go to the bathroom.    · To reduce the need for nighttime bathroom trips:  ¨ Avoid drinking fluids for several hours before going to bed  ¨ Empty your bladder before going to bed  ¨ Men can keep a urinal at the bedside  · Stay as active as you can. Balance, flexibility, strength, and endurance all come from exercise. They all play a role in preventing falls. Ask your healthcare provider which types of activity are right for you.  · Get your vision checked on a regular basis.  · If you have pets, know where they are before you stand up or walk so you don't trip over them.  · Use night lights.

## 2019-11-27 NOTE — INTERVAL H&P NOTE
The patient has been examined and the H&P has been reviewed:    I concur with the findings and no changes have occurred since H&P was written.    Anesthesia/Surgery risks, benefits and alternative options discussed and understood by patient/family.    AAOx3 NAD  Mood and affect normal  Memory and language intact  RRR  CTAB        Active Hospital Problems    Diagnosis  POA    DDD (degenerative disc disease), lumbar [M51.36]  Yes      Resolved Hospital Problems   No resolved problems to display.

## 2019-11-27 NOTE — OR NURSING
MD at bedside with patient and spouse. Results and follow-up discussed, patient and spouse verbalized understanding.

## 2019-12-03 ENCOUNTER — DOCUMENTATION ONLY (OUTPATIENT)
Dept: REHABILITATION | Facility: HOSPITAL | Age: 69
End: 2019-12-03
Attending: NURSE PRACTITIONER
Payer: MEDICARE

## 2019-12-03 ENCOUNTER — PATIENT MESSAGE (OUTPATIENT)
Dept: PAIN MEDICINE | Facility: CLINIC | Age: 69
End: 2019-12-03

## 2019-12-03 ENCOUNTER — TELEPHONE (OUTPATIENT)
Dept: PAIN MEDICINE | Facility: CLINIC | Age: 69
End: 2019-12-03

## 2019-12-03 NOTE — TELEPHONE ENCOUNTER
Message left asking patient to contact clinic as I am not sure what he is referring to as OP results/visit summary.  Phone number included.

## 2019-12-04 NOTE — PROGRESS NOTES
Health  Wellness Visit Note    Name: Luciano Henriquez Jr.  Clinic Number: [id number]  Physician: Juan Arteaga NP  Diagnosis: No diagnosis found.  Past Medical History:   Diagnosis Date    Allergy     Hyperlipidemia     Hypertension     Reflux esophagitis      Visit Number: 24  Precautions: DM      1st PT visit:  7/6/19  Year of care end date:  7/6/20  6 Month test  Performed:  (Due Jan)  12 Month test performed:   Mind body plan: Level B, 2/week  Patient level: Level B      Time In: 4:00 pm  Time Out: 5:00 pm  Total Treatment Time: 60    Subjective:   Patient reports he is stretching at home.   Mild back pain today.  He felt better after stretching.    Objective:   Luciano completed treadmill, therapeutic stretches (LTR, ham stretch, piriformis stretch, LAVELLE) and the following [employer] exercise machines: core lumbar, torso rotation l/r, leg extension, leg curl, upright row, chest press, biceps curl, triceps extension, leg press, hip abd, and hip add    See exercise log in patient folder for rate of exertion and repetitions completed.     Fitness Machine Education Key:  E-education on equipment initiated and further follow up and education needed  I-independent with  and exercise    Discussed but set up teaching not yet started    Lumbarl Ext. E Torso Rotation E Leg Press E   Leg Extension E Seated Leg Curl E Chest Press E   Seated Row E Hip ADD E Hip ABD E   Triceps Extension E Bicep Curl E Other:        Assessment:   Patient tolerated visit very well and wants to continue wellness to maintain strength.    Plan:  Continue with established plan of care towards wellness goals.     For health  : Shira Logan, PT  12/3/2019

## 2019-12-05 ENCOUNTER — TELEPHONE (OUTPATIENT)
Dept: PAIN MEDICINE | Facility: CLINIC | Age: 69
End: 2019-12-05

## 2019-12-05 ENCOUNTER — DOCUMENTATION ONLY (OUTPATIENT)
Dept: REHABILITATION | Facility: HOSPITAL | Age: 69
End: 2019-12-05
Attending: NURSE PRACTITIONER
Payer: MEDICARE

## 2019-12-05 NOTE — PROGRESS NOTES
Health  Wellness Visit Note    Name: Luciano Henriquez Jr.  Clinic Number: [id number]  Physician: Juan Arteaga NP  Diagnosis: No diagnosis found.  Past Medical History:   Diagnosis Date    Allergy     Hyperlipidemia     Hypertension     Reflux esophagitis      Visit Number: 25  Precautions: DM      1st PT visit:  7/6/19  Year of care end date:  7/6/20  6 Month test  Performed:  (Due Jan)  12 Month test performed:   Mind body plan: Level B, 2/week  Patient level: Level B      Time In: 10:50 am  Time Out: 11:50 am  Total Treatment Time: 60    Subjective:   Patient reports he is stretching at home.   Mild back pain today.  He felt better after stretching.    Objective:   Luciano completed treadmill, therapeutic stretches (LTR, ham stretch, piriformis stretch, LAVELLE) and the following [employer] exercise machines: core lumbar, torso rotation l/r, leg extension, leg curl, upright row, chest press, biceps curl, triceps extension, leg press, hip abd, and hip add    See exercise log in patient folder for rate of exertion and repetitions completed.     Fitness Machine Education Key:  E-education on equipment initiated and further follow up and education needed  I-independent with  and exercise    Discussed but set up teaching not yet started    Lumbarl Ext. I Torso Rotation E Leg Press I   Leg Extension E Seated Leg Curl E Chest Press I   Seated Row I Hip ADD I Hip ABD I   Triceps Extension I Bicep Curl I Other:        Assessment:   Patient tolerated visit very well and wants to continue wellness to maintain strength.    Plan:  Continue with established plan of care towards wellness goals. Patient now indep with  for majority of exercises    For health  : Shira Logan, PT  12/5/2019

## 2019-12-05 NOTE — TELEPHONE ENCOUNTER
Henriquez would like to move his appointment to an earlier date as the Caudal BETO did not provide any relief.  Scheduled for Monday 12/9/19 ay 3:00p.

## 2019-12-09 ENCOUNTER — OFFICE VISIT (OUTPATIENT)
Dept: PAIN MEDICINE | Facility: CLINIC | Age: 69
End: 2019-12-09
Payer: MEDICARE

## 2019-12-09 VITALS
OXYGEN SATURATION: 95 % | WEIGHT: 205.19 LBS | SYSTOLIC BLOOD PRESSURE: 134 MMHG | HEART RATE: 79 BPM | TEMPERATURE: 99 F | BODY MASS INDEX: 34.19 KG/M2 | HEIGHT: 65 IN | DIASTOLIC BLOOD PRESSURE: 72 MMHG

## 2019-12-09 DIAGNOSIS — M48.062 SPINAL STENOSIS OF LUMBAR REGION WITH NEUROGENIC CLAUDICATION: Primary | ICD-10-CM

## 2019-12-09 DIAGNOSIS — M47.816 LUMBAR SPONDYLOSIS: ICD-10-CM

## 2019-12-09 DIAGNOSIS — M51.36 DDD (DEGENERATIVE DISC DISEASE), LUMBAR: ICD-10-CM

## 2019-12-09 PROCEDURE — 1125F PR PAIN SEVERITY QUANTIFIED, PAIN PRESENT: ICD-10-PCS | Mod: ,,, | Performed by: PAIN MEDICINE

## 2019-12-09 PROCEDURE — 99999 PR PBB SHADOW E&M-EST. PATIENT-LVL IV: CPT | Mod: PBBFAC,,, | Performed by: PAIN MEDICINE

## 2019-12-09 PROCEDURE — 1125F AMNT PAIN NOTED PAIN PRSNT: CPT | Mod: ,,, | Performed by: PAIN MEDICINE

## 2019-12-09 PROCEDURE — 99214 PR OFFICE/OUTPT VISIT, EST, LEVL IV, 30-39 MIN: ICD-10-PCS | Mod: S$PBB,,, | Performed by: PAIN MEDICINE

## 2019-12-09 PROCEDURE — 1159F MED LIST DOCD IN RCRD: CPT | Mod: ,,, | Performed by: PAIN MEDICINE

## 2019-12-09 PROCEDURE — 99214 OFFICE O/P EST MOD 30 MIN: CPT | Mod: S$PBB,,, | Performed by: PAIN MEDICINE

## 2019-12-09 PROCEDURE — 1159F PR MEDICATION LIST DOCUMENTED IN MEDICAL RECORD: ICD-10-PCS | Mod: ,,, | Performed by: PAIN MEDICINE

## 2019-12-09 PROCEDURE — 99999 PR PBB SHADOW E&M-EST. PATIENT-LVL IV: ICD-10-PCS | Mod: PBBFAC,,, | Performed by: PAIN MEDICINE

## 2019-12-09 PROCEDURE — 99214 OFFICE O/P EST MOD 30 MIN: CPT | Mod: PBBFAC,PN | Performed by: PAIN MEDICINE

## 2019-12-09 NOTE — PROGRESS NOTES
Subjective:     Patient ID: Luciano Henriquez Jr. is a 69 y.o. male    Chief Complaint: Back Pain      Referred by: No ref. provider found      HPI:    Interval History (12/9/19):  He returns today for follow up.  He reports that repeat caudal epidural steroid injection has not been helpful for the low back and lower extremity pain. Patient denies any changes in the quality or location was pain. He denies any new or worsening symptoms.      Interval History (11/11/19):  He returns today for follow up.  He reports that caudal epidural steroid injection has not been helpful for the low back and lower extremity pain. He reports that got 1 day of near complete relief but after this day his pain gradually returned.  Currently he only reports 10-15% improvement from his baseline.  He denies any changes in the quality or location of his pain.  He denies any new or worsening symptoms.      Interval History (9/27/19):  He returns today for follow up and MRI review.  He reports that his pain is unchanged in quality location since last encounter.  He denies any new or worsening symptoms.      Initial Encounter (9/13/19):  Luciano Henriquez Jr. is a 69 y.o. male who presents today with chronic bilateral low back pain. Onset of pain was gradual without any specific inciting events or injuries.  Patient localizes the pain across the lower lumbar region.  The pain does radiate to the bilateral buttocks and thighs.  He denies any associated numbness, tingling, weakness, bowel bladder dysfunction.  The pain is intermittent and worsened with activity.  Patient states that he can walk 50-60 yd before the pain becomes bothersome.  The pain typically improves after sitting for 1-2 minutes.   This pain is described in detail below.    Physical Therapy:  Yes.  Somewhat helpful.    Non-pharmacologic Treatment:  Rest helps         · TENS?  No    Pain Medications:         · Currently taking:  Ibuprofen, tizanidine    · Has tried in the  past:      · Has not tried:  Opioids, , Tylenol, TCAs, SNRIs, anticonvulsants, topical creams    Blood thinners:  Aspirin    Interventional Therapies:    10/23/19 - caudal epidural steroid injection - 1 day of near complete relief  11/27/19 - caudal epidural steroid injection - no relief noted    Relevant Surgeries:  None    Affecting sleep?  No    Affecting daily activities? yes    Depressive symptoms? no          · SI/HI? No    Work status: Retried    Pain Scores:    Best:       2/10  Worst:    8/10  Usually:   4/10  Today:  6/10    Review of Systems   Constitutional: Negative for activity change, appetite change, chills, fatigue, fever and unexpected weight change.   HENT: Negative for hearing loss.    Eyes: Negative for visual disturbance.   Respiratory: Negative for chest tightness and shortness of breath.    Cardiovascular: Negative for chest pain.   Gastrointestinal: Negative for abdominal pain, constipation, diarrhea, nausea and vomiting.   Genitourinary: Negative for difficulty urinating.   Musculoskeletal: Positive for arthralgias, back pain, gait problem and myalgias. Negative for neck pain.   Skin: Negative for rash.   Neurological: Negative for dizziness, weakness, light-headedness, numbness and headaches.   Psychiatric/Behavioral: Negative for hallucinations, sleep disturbance and suicidal ideas. The patient is not nervous/anxious.        Past Medical History:   Diagnosis Date    Allergy     Hyperlipidemia     Hypertension     Reflux esophagitis        Past Surgical History:   Procedure Laterality Date    arthroscopic on left knee      CARPAL TUNNEL RELEASE      right hand only    deviated septum repair      EPIDURAL STEROID INJECTION N/A 10/23/2019    Procedure: Injection, Steroid, Epidural Caudal;  Surgeon: Jorge Warner Jr., MD;  Location: [facility];  Service: Pain Management;  Laterality: N/A;  Caudal BETO    [id number]    Arrive @ 0800 (morning request); IV Sedation- Fasting; ASA last  10/15; No DM    EPIDURAL STEROID INJECTION N/A 11/27/2019    Procedure: Injection, Steroid, Epidural Caudal;  Surgeon: Jorge Warner Jr., MD;  Location: [facility];  Service: Pain Management;  Laterality: N/A;  Caudal BETO    [id number]    Arrive @ 0830; IV Sedation- Fasting (F-50, V-1); ASA last 11/19; No DM    HERNIA REPAIR Left     inguinal    VASECTOMY         Social History     Socioeconomic History    Marital status:      Spouse name: Not on file    Number of children: Not on file    Years of education: Not on file    Highest education level: Not on file   Occupational History    Not on file   Social Needs    Financial resource strain: Not on file    Food insecurity:     Worry: Not on file     Inability: Not on file    Transportation needs:     Medical: Not on file     Non-medical: Not on file   Tobacco Use    Smoking status: Never Smoker    Smokeless tobacco: Never Used    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     Substance and Sexual Activity    Alcohol use: Yes     Alcohol/week: 14.0 standard drinks     Types: 14 Glasses of wine per week     Comment: Drinks 2 glasses of wine per night    Drug use: No    Sexual activity: Yes     Partners: Female   Lifestyle    Physical activity:     Days per week: Not on file     Minutes per session: Not on file    Stress: Not on file   Relationships    Social connections:     Talks on phone: Not on file     Gets together: Not on file     Attends Hoahaoism service: Not on file     Active member of club or organization: Not on file     Attends meetings of clubs or organizations: Not on file     Relationship status: Not on file   Other Topics Concern    Not on file   Social History Narrative    Not on file       Review of patient's allergies indicates:  No Known Allergies    Current Outpatient Medications on File Prior to Visit   Medication Sig Dispense Refill    albuterol (VENTOLIN HFA) 90 mcg/actuation inhaler Inhale  "2 puffs into the lungs every 6 (six) hours as needed for Wheezing or Shortness of Breath. Rescue 18 g 0    amitriptyline (ELAVIL) 100 MG tablet       amLODIPine (NORVASC) 10 MG tablet Take 1 tablet (10 mg total) by mouth once daily. 90 tablet 3    ascorbic acid (VITAMIN C) 500 MG tablet Take 500 mg by mouth 2 (two) times daily.      aspirin (ECOTRIN) 81 MG EC tablet Take 81 mg by mouth once daily.      azelastine (ASTELIN) 137 mcg (0.1 %) nasal spray 1 spray (137 mcg total) by Nasal route 2 (two) times daily. 30 mL 1    BOOSTRIX TDAP 2.5-8-5 Lf-mcg-Lf/0.5mL Syrg injection       chlorthalidone (HYGROTEN) 25 MG Tab Take 1 tablet (25 mg total) by mouth once daily. 90 tablet 3    doxepin (ZONALON) 5 % cream       fish oil-omega-3 fatty acids 300-1,000 mg capsule Take 2 g by mouth once daily.      multivitamin capsule Take 1 capsule by mouth once daily.      olmesartan (BENICAR) 40 MG tablet Take 1 tablet (40 mg total) by mouth once daily. 90 tablet 3    rosuvastatin (CRESTOR) 10 MG tablet Take 1 tablet (10 mg total) by mouth every evening. 90 tablet 3    sucralfate (CARAFATE) 1 gram tablet Take 1 tablet (1 g total) by mouth 4 (four) times daily before meals and nightly. 360 tablet 2    tiZANidine (ZANAFLEX) 4 MG tablet Take 1 tablet (4 mg total) by mouth every 8 (eight) hours as needed. 90 tablet 5     No current facility-administered medications on file prior to visit.        Objective:      /72   Pulse 79   Temp 99.4 °F (37.4 °C) (Oral)   Ht 5' 5" (1.651 m)   Wt 93.1 kg (205 lb 3.2 oz)   SpO2 95%   BMI 34.15 kg/m²     Exam:  GEN:  Well developed, well nourished.  No acute distress.  Normal pain behavior.  HEENT:  No trauma.  Mucous membranes moist.  Nares patent bilaterally.  PSYCH: Normal affect. Thought content appropriate.  CHEST:  Breathing symmetric.  No audible wheezing.  ABD: Soft, non-distended.  SKIN:  Warm, pink, dry.  No rash on exposed areas.    EXT:  No cyanosis, clubbing, or " edema.  No color change or changes in nail or hair growth.  NEURO/MUSCULOSKELETAL:  Fully alert, oriented, and appropriate. Speech normal yair. No cranial nerve deficits.   Gait:  Normal.  No trendelenburg sign bilaterally.   Motor Strength: 5/5 motor strength throughout lower extremities.   Sensory:  No sensory deficit in the lower extremities.   Reflexes:  2 + and symmetric throughout.  Downgoing Babinski's bilaterally.  No clonus or spasticity.  L-Spine:  Limited ROM with pain on extension. Negative pain with axial/facet loading bilaterally.  Negative SLR bilaterally.    No TTP over lumbar paraspinals, bilateral SI joints, hips, piriformis muscles, or GTB.            Imaging:  Outside lumbar MRI done on 9/24/19:    L2-3:  Mild spinal central stenosis secondary to disc bulge and facet and ligamentum flavum hypertrophy.  Right greater than left moderate foraminal narrowing.    L3-4:  Moderate central stenosis secondary to disc bulge and facet and ligamentum flavum hypertrophy.  Moderate right and moderate to severe left foraminal narrowing with probable but min of both exiting L3 nerve roots.    L4-5:  Mild central stenosis secondary to disc bulge and facet and ligamentum flavum hypertrophy.  Moderate to severe bilateral foraminal narrowing with probable abutment of both exiting L4 nerve roots.    L5-S1:  Slight indentation of the thecal sac secondary to disc bulge.  Conceivable impingement of the transitioning S1 nerve roots.  Moderate to severe left> right foraminal stenosis        Assessment:       Encounter Diagnoses   Name Primary?    Spinal stenosis of lumbar region with neurogenic claudication Yes    Lumbar spondylosis     DDD (degenerative disc disease), lumbar          Plan:       Luciano was seen today for back pain.    Diagnoses and all orders for this visit:    Spinal stenosis of lumbar region with neurogenic claudication  -     Ambulatory Referral to Neurosurgery    Lumbar spondylosis  -      Ambulatory Referral to Neurosurgery    DDD (degenerative disc disease), lumbar  -     Ambulatory Referral to Neurosurgery        Luciano Henriquez Jr. is a 69 y.o. male with chronic bilateral low back pain that radiates the bilateral lower extremities.  Exact etiology of pain of unknown though I do have suspicion for neurogenic claudication/spinal stenosis.  Low suspicion for facet or sacroiliac mediated pain. Low suspicion for radiculopathy.  Disc space narrowing noted on lumbar x-rays with significant lower lumbar facet arthritis.  Multilevel foraminal and central stenosis noted on lumbar MRI.  Good relief for 1 day following caudal epidural steroid injection. Repeat caudal epidural steroid injection does not provide any relief.    1.  Refer to Neurosurgery to discuss potential surgical options.  2.  If no neuro surgical options, we will consider spinal cord stimulator trial.  We will plan to use the [facility] system.   3.  Patient will call to schedule follow-up when appropriate.

## 2019-12-10 ENCOUNTER — DOCUMENTATION ONLY (OUTPATIENT)
Dept: REHABILITATION | Facility: HOSPITAL | Age: 69
End: 2019-12-10
Attending: NURSE PRACTITIONER
Payer: MEDICARE

## 2019-12-10 NOTE — PROGRESS NOTES
Health  Wellness Visit Note    Name: Luciano Henriquez Jr.  Clinic Number: [id number]  Physician: Juan rAteaga NP  Diagnosis: No diagnosis found.  Past Medical History:   Diagnosis Date    Allergy     Hyperlipidemia     Hypertension     Reflux esophagitis      Visit Number: 26  Precautions: DM      1st PT visit:  7/6/19  Year of care end date:  7/6/20  6 Month test  Performed:  (Due Jan)  12 Month test performed:   Mind body plan: Level B, 2/week  Patient level: Level B      Time In: 2:00 PM  Time Out: 3:00 PM  Total Treatment Time: 60 minutes    Subjective:   Patient reports that his lower back feels good today, but his average pain during the week can get to a 5. He feels relief from pain with stretching. Stretching has been being completed about 4 to 5 days per week. Icing has not been being completed. Exercising has been being completed about 4 to 5 days per week.    Objective:   Luciano completed treadmill, therapeutic stretches (LTR, ham stretch, piriformis stretch, LAVELLE) and the following MedX exercise machines: core lumbar, torso rotation l/r, leg extension, leg curl, upright row, chest press, biceps curl, triceps extension, leg press, hip abd, and hip add    See exercise log in patient folder for rate of exertion and repetitions completed.     Fitness Machine Education Key:  E-education on equipment initiated and further follow up and education needed  I-independent with  and exercise    Discussed but set up teaching not yet started    Lumbarl Ext. I Torso Rotation E Leg Press I   Leg Extension E Seated Leg Curl E Chest Press I   Seated Row I Hip ADD I Hip ABD I   Triceps Extension I Bicep Curl I Other:        Assessment:   Patient tolerated visit very well and wants to continue wellness to maintain strength.    Plan:  Continue with established plan of care towards wellness goals. Patient now indep with  for majority of exercises    For health  : Rubens  Donaldo  12/10/2019

## 2019-12-12 ENCOUNTER — OFFICE VISIT (OUTPATIENT)
Dept: NEUROSURGERY | Facility: CLINIC | Age: 69
End: 2019-12-12
Payer: MEDICARE

## 2019-12-12 VITALS
BODY MASS INDEX: 34.16 KG/M2 | HEART RATE: 75 BPM | HEIGHT: 65 IN | DIASTOLIC BLOOD PRESSURE: 68 MMHG | WEIGHT: 205 LBS | SYSTOLIC BLOOD PRESSURE: 142 MMHG

## 2019-12-12 DIAGNOSIS — M51.36 DDD (DEGENERATIVE DISC DISEASE), LUMBAR: Primary | ICD-10-CM

## 2019-12-12 DIAGNOSIS — M54.50 CHRONIC MIDLINE LOW BACK PAIN WITHOUT SCIATICA: ICD-10-CM

## 2019-12-12 DIAGNOSIS — G89.29 CHRONIC MIDLINE LOW BACK PAIN WITHOUT SCIATICA: ICD-10-CM

## 2019-12-12 PROCEDURE — 99999 PR PBB SHADOW E&M-EST. PATIENT-LVL III: ICD-10-PCS | Mod: PBBFAC,,, | Performed by: PHYSICIAN ASSISTANT

## 2019-12-12 PROCEDURE — 1125F PR PAIN SEVERITY QUANTIFIED, PAIN PRESENT: ICD-10-PCS | Mod: ,,, | Performed by: PHYSICIAN ASSISTANT

## 2019-12-12 PROCEDURE — 1159F MED LIST DOCD IN RCRD: CPT | Mod: ,,, | Performed by: PHYSICIAN ASSISTANT

## 2019-12-12 PROCEDURE — 99204 PR OFFICE/OUTPT VISIT, NEW, LEVL IV, 45-59 MIN: ICD-10-PCS | Mod: S$PBB,,, | Performed by: PHYSICIAN ASSISTANT

## 2019-12-12 PROCEDURE — 99999 PR PBB SHADOW E&M-EST. PATIENT-LVL III: CPT | Mod: PBBFAC,,, | Performed by: PHYSICIAN ASSISTANT

## 2019-12-12 PROCEDURE — 99204 OFFICE O/P NEW MOD 45 MIN: CPT | Mod: S$PBB,,, | Performed by: PHYSICIAN ASSISTANT

## 2019-12-12 PROCEDURE — 1159F PR MEDICATION LIST DOCUMENTED IN MEDICAL RECORD: ICD-10-PCS | Mod: ,,, | Performed by: PHYSICIAN ASSISTANT

## 2019-12-12 PROCEDURE — 1125F AMNT PAIN NOTED PAIN PRSNT: CPT | Mod: ,,, | Performed by: PHYSICIAN ASSISTANT

## 2019-12-12 PROCEDURE — 99213 OFFICE O/P EST LOW 20 MIN: CPT | Mod: PBBFAC | Performed by: PHYSICIAN ASSISTANT

## 2019-12-12 NOTE — LETTER
December 12, 2019      Jorge Warner Jr., MD  [address]  [address]  [facility] [address]           [facility]  [address]  [facility] [address]  Phone: [phone]  Fax: [phone]          Patient: Luciano Henriquez Jr.   MR Number: [id number]   YOB: 1950   Date of Visit: 12/12/2019       Dear Dr. Jorge Warner Jr.:    Thank you for referring Luciano Henriquez to me for evaluation. Attached you will find relevant portions of my assessment and plan of care.    If you have questions, please do not hesitate to call me. I look forward to following Luciano Henriquez along with you.    Sincerely,    Cristina Mena PA-C    Enclosure  CC:  No Recipients    If you would like to receive this communication electronically, please contact [email or web address] or [phone] to request more information on [employer] Link access.    For providers and/or their staff who would like to refer a patient to [facility], please contact us through our one-stop-shop provider referral line, [facility], at [phone].    If you feel you have received this communication in error or would no longer like to receive these types of communications, please e-mail [email or web address]

## 2019-12-12 NOTE — PROGRESS NOTES
[facility]  Neurosurgery    SUBJECTIVE:     History of Present Illness:  Luciano Henriquez Jr. is a 69 y.o. male with HLD and DM who presents with chronic low back pain as a referral from Dr. Warner.    Symptom onset:  May 2019  Location:  Primarily midline low back, occasional radiation into anterior/posterior thighs bilaterally  Pain level:  5/10 today, 0/10 at best  Inciting factors:  Walking for > a few minutes increases his back pain only.  Getting up from a sitting or lying position causes his leg pain  Relieving factors:  Back pain is relieved with leaning forward over a shopping basket  Numbness:  Denies  Weakness:  Denies  Denies b/b dysfunction and saddle anesthesia  Denies gait disturbance   Denies falls    Treatments tried:  -PT:  Has tried physical therapy and healthy back program with some relief  -BETO:  2 caudal BETO, both of which she reports Dr. Warner was unable to inject the full amount of steroids  -Gabapentin:  Not currently taking  -Muscle relaxer:  Tizanidine  -Rx pain medications:  Not currently taking  -Spine surgery:  Never    Blood thinners:  ASA81 and fish oil for prevention.  No h/o CVA or MI      (Not in a hospital admission)    Review of patient's allergies indicates:  No Known Allergies    Past Medical History:   Diagnosis Date    Allergy     Hyperlipidemia     Hypertension     Reflux esophagitis      Past Surgical History:   Procedure Laterality Date    arthroscopic on left knee      CARPAL TUNNEL RELEASE      right hand only    deviated septum repair      EPIDURAL STEROID INJECTION N/A 10/23/2019    Procedure: Injection, Steroid, Epidural Caudal;  Surgeon: Jorge Warner Jr., MD;  Location: [facility];  Service: Pain Management;  Laterality: N/A;  Caudal BETO    [id number]    Arrive @ 0800 (morning request); IV Sedation- Fasting; ASA last 10/15; No DM    EPIDURAL STEROID INJECTION N/A 11/27/2019    Procedure: Injection, Steroid, Epidural Caudal;  Surgeon:  Jorge Warner Jr., MD;  Location: [facility];  Service: Pain Management;  Laterality: N/A;  Caudal BETO    [id number]    Arrive @ 0830; IV Sedation- Fasting (F-50, V-1); ASA last 11/19; No DM    HERNIA REPAIR Left     inguinal    VASECTOMY       Family History   Problem Relation Age of Onset    Arthritis Mother     Hyperlipidemia Mother     Hypertension Mother     Cancer Father         prostate    Stroke Father      Social History     Tobacco Use    Smoking status: Never Smoker    Smokeless tobacco: Never Used    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     Substance Use Topics    Alcohol use: Yes     Alcohol/week: 14.0 standard drinks     Types: 14 Glasses of wine per week     Comment: Drinks 2 glasses of wine per night    Drug use: No        Review of Systems:  As noted in HPI    OBJECTIVE:     Vital Signs (Most Recent):  Pulse: 75 (12/12/19 1008)  BP: (!) 142/68 (12/12/19 1008)    Physical Exam:  General: well developed, well nourished, no distress  Head: normocephalic, atraumatic  Neurologic: Alert and oriented. Thought content appropriate  GCS: Motor: 6/Verbal: 5/Eyes: 4 GCS Total: 15  Language: No aphasia  Speech: No dysarthria  Cranial nerves: face symmetric, tongue midline, CN II-XII grossly intact.   Eyes: pupils equal, round, reactive to light with accommodation, EOMI.   Pulmonary: normal respirations, not labored, no accessory muscles used  Sensory: intact to light touch throughout  Motor Strength: Moves all extremities spontaneously with good tone.  Full strength upper and lower extremities. No abnormal movements seen.     Strength  Deltoids Triceps Biceps Wrist Extension Wrist Flexion Hand    Upper: R 5/5 5/5 5/5 5/5 5/5 5/5    L 5/5 5/5 5/5 5/5 5/5 5/5     Iliopsoas Quadriceps Knee  Flexion Tibialis  anterior Gastro- cnemius EHL   Lower: R 5/5 5/5 5/5 5/5 5/5 5/5    L 5/5 5/5 5/5 5/5 5/5 5/5     DTR's - 1+ and symmetric patellar & absent achilles  Bruno:  absent  Clonus: absent  Skin: warm, dry and intact, no rashes  Gait: normal      Cervical ROM: full  Lumbar ROM: full   SI Joint tenderness: Negative   Pain on Hip ROM: Negative  Straight leg raise:  Negative bilaterally  Midline Bony Tenderness:  Negative throughout  Paraspinous muscle tenderness:  Negative throughout    Diagnostic Results:  I have personally reviewed imaging and agree with the findings.     MRI lumbar spine 09/24/2019  -straightening of lumbar lordosis  -disc bulges from L2 through S1  -moderate central stenosis at L2-3 and L3-4  -mild central stenosis at L4-5  -moderate b/l foraminal stenosis from L3-4 through L5-S1, right > left    ASSESSMENT/PLAN:     Luciano Henriquez Jr. is a 69 y.o. male who presents with chronic low back pain after prolonged walking that improves with leaning forward.  Does admit to a radicular component to his pain in his anterior and posterior thighs, but he states this pain is not associated with walking and only occurs when he gets up from a sitting or lying position.  He denies any numbness, weakness, or heaviness in his legs with prolonged walking.  He has failed 2 caudal ESIs with Dr. Warner.  However, he reports that both ESIs were complicated by needle obstruction during placement and not all medication was injected.  He has tried physical therapy and the healthy back program with some relief.  He reports Dr. Warner is considering a spinal cord stimulator if lumbar decompression is not warranted.  After reviewing MRI, while there is moderate central stenosis and moderate foraminal stenosis, the central stenosis does not appear to be severe enough to explain his symptoms.  I believe he should return to Dr. Warner to attempt additional interventional pain management procedures prior to attempting lumbar decompression surgery. These may include transforaminal BETO, ablations, facet injections, or other procedures as deemed appropriate by Dr. Warner. Will have  patient return in 3 months or sooner if symptoms worsen      Please feel free to call with any further questions    Disclaimer: This note was dictated by speech recognition. Minor errors in transcription may be present.  Please call with any questions.      Cristina Mena PA-C  [facility]  Department of Neurosurgery  [phone]

## 2019-12-13 ENCOUNTER — TELEPHONE (OUTPATIENT)
Dept: NEUROSURGERY | Facility: CLINIC | Age: 69
End: 2019-12-13

## 2019-12-13 NOTE — TELEPHONE ENCOUNTER
Called to inform pt of scheduled imaging before pain management appt with Dr Warner. Patient confirmed and understood appointment.

## 2019-12-17 ENCOUNTER — OFFICE VISIT (OUTPATIENT)
Dept: PAIN MEDICINE | Facility: CLINIC | Age: 69
End: 2019-12-17
Payer: MEDICARE

## 2019-12-17 ENCOUNTER — DOCUMENTATION ONLY (OUTPATIENT)
Dept: REHABILITATION | Facility: HOSPITAL | Age: 69
End: 2019-12-17
Attending: NURSE PRACTITIONER
Payer: MEDICARE

## 2019-12-17 VITALS
WEIGHT: 206 LBS | HEIGHT: 65 IN | SYSTOLIC BLOOD PRESSURE: 132 MMHG | BODY MASS INDEX: 34.32 KG/M2 | HEART RATE: 82 BPM | DIASTOLIC BLOOD PRESSURE: 68 MMHG | OXYGEN SATURATION: 97 % | TEMPERATURE: 99 F

## 2019-12-17 DIAGNOSIS — M51.36 DDD (DEGENERATIVE DISC DISEASE), LUMBAR: Primary | ICD-10-CM

## 2019-12-17 DIAGNOSIS — M48.062 SPINAL STENOSIS OF LUMBAR REGION WITH NEUROGENIC CLAUDICATION: ICD-10-CM

## 2019-12-17 DIAGNOSIS — M47.816 LUMBAR SPONDYLOSIS: ICD-10-CM

## 2019-12-17 PROCEDURE — 1159F MED LIST DOCD IN RCRD: CPT | Mod: ,,, | Performed by: PAIN MEDICINE

## 2019-12-17 PROCEDURE — 1125F PR PAIN SEVERITY QUANTIFIED, PAIN PRESENT: ICD-10-PCS | Mod: ,,, | Performed by: PAIN MEDICINE

## 2019-12-17 PROCEDURE — 99999 PR PBB SHADOW E&M-EST. PATIENT-LVL III: ICD-10-PCS | Mod: PBBFAC,,, | Performed by: PAIN MEDICINE

## 2019-12-17 PROCEDURE — 99213 OFFICE O/P EST LOW 20 MIN: CPT | Mod: PBBFAC,25,PN | Performed by: PAIN MEDICINE

## 2019-12-17 PROCEDURE — 99213 OFFICE O/P EST LOW 20 MIN: CPT | Mod: S$PBB,,, | Performed by: PAIN MEDICINE

## 2019-12-17 PROCEDURE — 99213 PR OFFICE/OUTPT VISIT, EST, LEVL III, 20-29 MIN: ICD-10-PCS | Mod: S$PBB,,, | Performed by: PAIN MEDICINE

## 2019-12-17 PROCEDURE — 1159F PR MEDICATION LIST DOCUMENTED IN MEDICAL RECORD: ICD-10-PCS | Mod: ,,, | Performed by: PAIN MEDICINE

## 2019-12-17 PROCEDURE — 99999 PR PBB SHADOW E&M-EST. PATIENT-LVL III: CPT | Mod: PBBFAC,,, | Performed by: PAIN MEDICINE

## 2019-12-17 PROCEDURE — 1125F AMNT PAIN NOTED PAIN PRSNT: CPT | Mod: ,,, | Performed by: PAIN MEDICINE

## 2019-12-17 NOTE — PROGRESS NOTES
Subjective:     Patient ID: Luciano Henriquez Jr. is a 69 y.o. male    Chief Complaint: Back Pain      Referred by: No ref. provider found      HPI:    Interval History (12/17/19):  He returns today for follow up.  He reports that his pain is unchanged in quality location since last encounter.  He denies any new or worsening symptoms.  He has been evaluated by Neurosurgery and no neuro surgical evaluations were deemed appropriate.      Interval History (12/9/19):  He returns today for follow up.  He reports that repeat caudal epidural steroid injection has not been helpful for the low back and lower extremity pain. Patient denies any changes in the quality or location was pain. He denies any new or worsening symptoms.      Interval History (11/11/19):  He returns today for follow up.  He reports that caudal epidural steroid injection has not been helpful for the low back and lower extremity pain. He reports that got 1 day of near complete relief but after this day his pain gradually returned.  Currently he only reports 10-15% improvement from his baseline.  He denies any changes in the quality or location of his pain.  He denies any new or worsening symptoms.      Interval History (9/27/19):  He returns today for follow up and MRI review.  He reports that his pain is unchanged in quality location since last encounter.  He denies any new or worsening symptoms.      Initial Encounter (9/13/19):  Luciano Henriquez Jr. is a 69 y.o. male who presents today with chronic bilateral low back pain. Onset of pain was gradual without any specific inciting events or injuries.  Patient localizes the pain across the lower lumbar region.  The pain does radiate to the bilateral buttocks and thighs.  He denies any associated numbness, tingling, weakness, bowel bladder dysfunction.  The pain is intermittent and worsened with activity.  Patient states that he can walk 50-60 yd before the pain becomes bothersome.  The pain typically  improves after sitting for 1-2 minutes.   This pain is described in detail below.    Physical Therapy:  Yes.  Somewhat helpful.    Non-pharmacologic Treatment:  Rest helps         · TENS?  No    Pain Medications:         · Currently taking:  Ibuprofen, tizanidine    · Has tried in the past:      · Has not tried:  Opioids, , Tylenol, TCAs, SNRIs, anticonvulsants, topical creams    Blood thinners:  Aspirin    Interventional Therapies:    10/23/19 - caudal epidural steroid injection - 1 day of near complete relief  11/27/19 - caudal epidural steroid injection - no relief noted    Relevant Surgeries:  None    Affecting sleep?  No    Affecting daily activities? yes    Depressive symptoms? no          · SI/HI? No    Work status: Retried    Pain Scores:    Best:       2/10  Worst:    8/10  Usually:   4/10  Today:  6/10    Review of Systems   Constitutional: Negative for activity change, appetite change, chills, fatigue, fever and unexpected weight change.   HENT: Negative for hearing loss.    Eyes: Negative for visual disturbance.   Respiratory: Negative for chest tightness and shortness of breath.    Cardiovascular: Negative for chest pain.   Gastrointestinal: Negative for abdominal pain, constipation, diarrhea, nausea and vomiting.   Genitourinary: Negative for difficulty urinating.   Musculoskeletal: Positive for arthralgias, back pain, gait problem and myalgias. Negative for neck pain.   Skin: Negative for rash.   Neurological: Negative for dizziness, weakness, light-headedness, numbness and headaches.   Psychiatric/Behavioral: Negative for hallucinations, sleep disturbance and suicidal ideas. The patient is not nervous/anxious.        Past Medical History:   Diagnosis Date    Allergy     Hyperlipidemia     Hypertension     Reflux esophagitis        Past Surgical History:   Procedure Laterality Date    arthroscopic on left knee      CARPAL TUNNEL RELEASE      right hand only    deviated septum repair       EPIDURAL STEROID INJECTION N/A 10/23/2019    Procedure: Injection, Steroid, Epidural Caudal;  Surgeon: Jorge Warner Jr., MD;  Location: [facility] ENDO;  Service: Pain Management;  Laterality: N/A;  Caudal BETO    25515    Arrive @ 0800 (morning request); IV Sedation- Fasting; ASA last 10/15; No DM    EPIDURAL STEROID INJECTION N/A 11/27/2019    Procedure: Injection, Steroid, Epidural Caudal;  Surgeon: Jorge Warner Jr., MD;  Location: [facility] ENDO;  Service: Pain Management;  Laterality: N/A;  Caudal BETO    53484    Arrive @ 0830; IV Sedation- Fasting (F-50, V-1); ASA last 11/19; No DM    HERNIA REPAIR Left     inguinal    VASECTOMY         Social History     Socioeconomic History    Marital status:      Spouse name: Not on file    Number of children: Not on file    Years of education: Not on file    Highest education level: Not on file   Occupational History    Not on file   Social Needs    Financial resource strain: Not on file    Food insecurity:     Worry: Not on file     Inability: Not on file    Transportation needs:     Medical: Not on file     Non-medical: Not on file   Tobacco Use    Smoking status: Never Smoker    Smokeless tobacco: Never Used    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     Substance and Sexual Activity    Alcohol use: Yes     Alcohol/week: 14.0 standard drinks     Types: 14 Glasses of wine per week     Comment: Drinks 2 glasses of wine per night    Drug use: No    Sexual activity: Yes     Partners: Female   Lifestyle    Physical activity:     Days per week: Not on file     Minutes per session: Not on file    Stress: Not on file   Relationships    Social connections:     Talks on phone: Not on file     Gets together: Not on file     Attends Sikhism service: Not on file     Active member of club or organization: Not on file     Attends meetings of clubs or organizations: Not on file     Relationship status: Not on file   Other  "Topics Concern    Not on file   Social History Narrative    Not on file       Review of patient's allergies indicates:  No Known Allergies    Current Outpatient Medications on File Prior to Visit   Medication Sig Dispense Refill    albuterol (VENTOLIN HFA) 90 mcg/actuation inhaler Inhale 2 puffs into the lungs every 6 (six) hours as needed for Wheezing or Shortness of Breath. Rescue 18 g 0    amitriptyline (ELAVIL) 100 MG tablet       amLODIPine (NORVASC) 10 MG tablet Take 1 tablet (10 mg total) by mouth once daily. 90 tablet 3    ascorbic acid (VITAMIN C) 500 MG tablet Take 500 mg by mouth 2 (two) times daily.      aspirin (ECOTRIN) 81 MG EC tablet Take 81 mg by mouth once daily.      azelastine (ASTELIN) 137 mcg (0.1 %) nasal spray 1 spray (137 mcg total) by Nasal route 2 (two) times daily. 30 mL 1    BOOSTRIX TDAP 2.5-8-5 Lf-mcg-Lf/0.5mL Syrg injection       chlorthalidone (HYGROTEN) 25 MG Tab Take 1 tablet (25 mg total) by mouth once daily. 90 tablet 3    doxepin (ZONALON) 5 % cream       fish oil-omega-3 fatty acids 300-1,000 mg capsule Take 2 g by mouth once daily.      multivitamin capsule Take 1 capsule by mouth once daily.      olmesartan (BENICAR) 40 MG tablet Take 1 tablet (40 mg total) by mouth once daily. 90 tablet 3    rosuvastatin (CRESTOR) 10 MG tablet Take 1 tablet (10 mg total) by mouth every evening. 90 tablet 3    sucralfate (CARAFATE) 1 gram tablet Take 1 tablet (1 g total) by mouth 4 (four) times daily before meals and nightly. 360 tablet 2    tiZANidine (ZANAFLEX) 4 MG tablet Take 1 tablet (4 mg total) by mouth every 8 (eight) hours as needed. 90 tablet 5     No current facility-administered medications on file prior to visit.        Objective:      /68   Pulse 82   Temp 99.3 °F (37.4 °C) (Oral)   Ht 5' 5" (1.651 m)   Wt 93.4 kg (206 lb)   SpO2 97%   BMI 34.28 kg/m²     Exam:  GEN:  Well developed, well nourished.  No acute distress.   HEENT:  No trauma.  Mucous " membranes moist.  Nares patent bilaterally.  PSYCH: Normal affect. Thought content appropriate.  CHEST:  Breathing symmetric.  No audible wheezing.  ABD: Soft, non-distended.  SKIN:  Warm, pink, dry.  No rash on exposed areas.    EXT:  No cyanosis, clubbing, or edema.  No color change or changes in nail or hair growth.  NEURO/MUSCULOSKELETAL:  Fully alert, oriented, and appropriate. Speech normal yair. No cranial nerve deficits.   Gait:  Normal.  No focal motor deficits.           Imaging:  Outside lumbar MRI done on 9/24/19:    L2-3:  Mild spinal central stenosis secondary to disc bulge and facet and ligamentum flavum hypertrophy.  Right greater than left moderate foraminal narrowing.    L3-4:  Moderate central stenosis secondary to disc bulge and facet and ligamentum flavum hypertrophy.  Moderate right and moderate to severe left foraminal narrowing with probable but min of both exiting L3 nerve roots.    L4-5:  Mild central stenosis secondary to disc bulge and facet and ligamentum flavum hypertrophy.  Moderate to severe bilateral foraminal narrowing with probable abutment of both exiting L4 nerve roots.    L5-S1:  Slight indentation of the thecal sac secondary to disc bulge.  Conceivable impingement of the transitioning S1 nerve roots.  Moderate to severe left> right foraminal stenosis        Assessment:       Encounter Diagnoses   Name Primary?    DDD (degenerative disc disease), lumbar Yes    Lumbar spondylosis     Spinal stenosis of lumbar region with neurogenic claudication          Plan:       Luciano was seen today for back pain.    Diagnoses and all orders for this visit:    DDD (degenerative disc disease), lumbar    Lumbar spondylosis    Spinal stenosis of lumbar region with neurogenic claudication        Luciano Santos Martinez Saxena is a 69 y.o. male with chronic bilateral low back pain that radiates the bilateral lower extremities.  Exact etiology of pain of unknown though I do have suspicion for  neurogenic claudication/spinal stenosis.  Low suspicion for facet or sacroiliac mediated pain. Low suspicion for radiculopathy.  Disc space narrowing noted on lumbar x-rays with significant lower lumbar facet arthritis.  Multilevel foraminal and central stenosis noted on lumbar MRI.  Good relief for 1 day following caudal epidural steroid injection. Repeat caudal epidural steroid injection does not provide any relief.    1.  Schedule for bilateral L3, L4 and L5 medial branch blocks to rule out facet mediated pain.  If diagnostic can proceed with radiofrequency ablation.  If not diagnostic will likely proceed with psychologic evaluation prior to spinal cord stimulator trial.  2.  Return to clinic as needed.

## 2019-12-17 NOTE — PROGRESS NOTES
Health  Wellness Visit Note    Name: Luciano Henriquez Jr.  Clinic Number: [id number]  Physician: Juan Arteaga NP  Diagnosis: No diagnosis found.  Past Medical History:   Diagnosis Date    Allergy     Hyperlipidemia     Hypertension     Reflux esophagitis      Visit Number: 27  Precautions: DM      1st PT visit:  7/6/19  Year of care end date:  7/6/20  6 Month test  Performed:  (Due Jan)  12 Month test performed:   Mind body plan: Level B, 2/week  Patient level: Level B      Time In: 9:33 AM  Time Out: 10:50 AM  Total Treatment Time: 77 minutes    Subjective:   Patient reports that the stiffness in his lower back has been a 5 to 6 out of 10 and the pain has been a 4 to 5 out of 10 in the morning. The more he moves throughout the day, the stiffness and pain gets progressively better. Stretched the night before, but hasn't been stretching as much as usual. Icing has not been being completed, but heat has been being used every now and then. Exercising has not been done as much as before, but he still stays active throughout each day. Patient is now independent on all machines, but still needs guidance every now and then.    Objective:   Luciano completed treadmill, therapeutic stretches (LTR, ham stretch, piriformis stretch, LAVELLE) and the following MedX exercise machines: core lumbar, torso rotation l/r, leg extension, leg curl, upright row, chest press, biceps curl, triceps extension, leg press, hip abd, and hip add    See exercise log in patient folder for rate of exertion and repetitions completed.     Fitness Machine Education Key:  E-education on equipment initiated and further follow up and education needed  I-independent with  and exercise    Discussed but set up teaching not yet started    Lumbarl Ext. I Torso Rotation I Leg Press I   Leg Extension I Seated Leg Curl I Chest Press I   Seated Row I Hip ADD I Hip ABD I   Triceps Extension I Bicep Curl I Other:        Assessment:   Patient  tolerated visit very well and wants to continue wellness to maintain strength.    Plan:  Continue with established plan of care towards wellness goals. Patient now indep with  for majority of exercises    For health  : Rubens Gallo  12/17/2019

## 2019-12-18 ENCOUNTER — PATIENT MESSAGE (OUTPATIENT)
Dept: PAIN MEDICINE | Facility: CLINIC | Age: 69
End: 2019-12-18

## 2019-12-19 ENCOUNTER — TELEPHONE (OUTPATIENT)
Dept: PAIN MEDICINE | Facility: CLINIC | Age: 69
End: 2019-12-19

## 2019-12-19 ENCOUNTER — DOCUMENTATION ONLY (OUTPATIENT)
Dept: REHABILITATION | Facility: HOSPITAL | Age: 69
End: 2019-12-19
Attending: NURSE PRACTITIONER
Payer: MEDICARE

## 2019-12-19 DIAGNOSIS — M47.816 LUMBAR SPONDYLOSIS: Primary | ICD-10-CM

## 2019-12-19 NOTE — PROGRESS NOTES
Health  Wellness Visit Note    Name: Luciano Henriquez Jr.  Clinic Number: [id number]  Physician: Juan Arteaga NP  Diagnosis: No diagnosis found.  Past Medical History:   Diagnosis Date    Allergy     Hyperlipidemia     Hypertension     Reflux esophagitis      Visit Number: 28  Precautions: DM      1st PT visit:  7/6/19  Year of care end date:  7/6/20  6 Month test  Performed:  (Due Jan)  12 Month test performed:   Mind body plan: Level B, 2/week  Patient level: Level B      Time In: 11:00 AM   Time Out: 12:12 PM  Total Treatment Time: 72 minutes     Subjective:   Patient reports that his lower back discomfort is currently at a 3/10. The patient reported that the more he moves throughout the day, the less pain he feels. He is able to get his pain level down to 0/10 when he is laying down at night. Patient had two previous injections in his back that did not help with his pain - patient is scheduled to receive another injection at the end of January. Patient is stretching approximately every other day at home and does not ice - patient prefers heat at home - ices at wellness only. Patient has been exercising 3-4 times per week for 30 minutes on his recumbent bike at home. Patient is independent and needs very little guidance setting up machines and going through exercises.     Objective:   Luciano completed treadmill, therapeutic stretches (LTR, ham stretch, piriformis stretch, LAVELLE) and the following Patterns exercise machines: core lumbar, torso rotation l/r, leg extension, leg curl, upright row, chest press, biceps curl, triceps extension, leg press, hip abd, and hip add    See exercise log in patient folder for rate of exertion and repetitions completed.     Fitness Machine Education Key:  E-education on equipment initiated and further follow up and education needed  I-independent with  and exercise    Discussed but set up teaching not yet started    Lumbarl Ext. I Torso Rotation I Leg Press I    Leg Extension I Seated Leg Curl I Chest Press I   Seated Row I Hip ADD I Hip ABD I   Triceps Extension I Bicep Curl I Other:        Assessment:   Patient tolerated visit very well and wants to continue wellness to maintain strength.    Plan:  Continue with established plan of care towards wellness goals. Patient now indep with  for majority of exercises    For health  : Madhavi Santoyo  12/19/2019

## 2019-12-19 NOTE — TELEPHONE ENCOUNTER
Mr. Henriquez will be scheduled for bilateral L3-5 MBB on 1/29/20 as that is our next available procedure date.  Informed patient that he does not need to hold ASA 81mg prior to this procedure.

## 2019-12-31 ENCOUNTER — DOCUMENTATION ONLY (OUTPATIENT)
Dept: REHABILITATION | Facility: HOSPITAL | Age: 69
End: 2019-12-31
Attending: NURSE PRACTITIONER
Payer: MEDICARE

## 2019-12-31 NOTE — PROGRESS NOTES
Health  Wellness Visit Note    Name: Luciano Henriquez Jr.  Clinic Number: [id number]  Physician: Juan Arteaga NP  Diagnosis: No diagnosis found.  Past Medical History:   Diagnosis Date    Allergy     Hyperlipidemia     Hypertension     Reflux esophagitis      Visit Number: 29  Precautions: DM      1st PT visit:  7/6/19  Year of care end date:  7/6/20  6 Month test  Performed:  (Due Jan)  12 Month test performed:   Mind body plan: Level A, 2/week  Patient level: Level A      Time In: 10:00  AM   Time Out: 11:00 am  Total Treatment Time: 72 minutes     Subjective:   Patient reports that his lower back discomfort is currently at a 3/10. The patient reported that the more he moves throughout the day, the less pain he feels.  He drove to [place] for Christmas and tolerated it much better than usual.  However he doesn't have a lumbar roll, and he didn't stretch much.  We discussed getting his back on track after the holidays and resuming regular stretching.  He agreed and notices the difference when he doesn't stretch.  We reviewed lumbar rolls as well and he plans to get one.      Patient is independent and needs very little guidance setting up machines and going through exercises.     Objective:   Luciano completed treadmill, therapeutic stretches (LTR, ham stretch, piriformis stretch, LAVELLE) and the following MedX exercise machines: core lumbar, torso rotation l/r, leg extension, leg curl, upright row, chest press, biceps curl, triceps extension, leg press, hip abd, and hip add    See exercise log in patient folder for rate of exertion and repetitions completed.     Fitness Machine Education Key:  E-education on equipment initiated and further follow up and education needed  I-independent with  and exercise    Discussed but set up teaching not yet started    Lumbarl Ext. I Torso Rotation I Leg Press I   Leg Extension I Seated Leg Curl I Chest Press I   Seated Row I Hip ADD I Hip ABD I   Triceps  Extension I Bicep Curl I Other:        Assessment:   Patient tolerated visit very well and wants to continue wellness to maintain strength.    Plan:  Continue with established plan of care towards wellness goals. Patient now indep with  for majority of exercises    For health  : Shira Logan, PT  12/31/2019

## 2020-01-02 ENCOUNTER — DOCUMENTATION ONLY (OUTPATIENT)
Dept: REHABILITATION | Facility: HOSPITAL | Age: 70
End: 2020-01-02
Attending: NURSE PRACTITIONER
Payer: MEDICARE

## 2020-01-02 NOTE — PROGRESS NOTES
Health  Wellness Visit Note    Name: Luciano Henriquez Jr.  Clinic Number: [id number]  Physician: Juan Arteaga NP  Diagnosis: No diagnosis found.  Past Medical History:   Diagnosis Date    Allergy     Hyperlipidemia     Hypertension     Reflux esophagitis      Visit Number: 30  Precautions: DM      1st PT visit:  7/6/19  Year of care end date:  7/6/20  6 Month test  Performed:  (Due Jan)  12 Month test performed:   Mind body plan: Level B, 2/week  Patient level: Level B      Time In: 1:05 PM  Time Out: 2:15 PM  Total Treatment Time: 70 minutes    Subjective:   Patient reports that his lower back has some discomfort today. Patient rates his pain at a 3/10 on the pain scale and describes the pain as an ache in his lower right side. Patient felt stiff this morning and stretched and took Advil as recommended by his doctor. Patient just returned from [place] and was able to make the drive without any discomforts from his back - due to the trip that patient has not been icing, stretching, or exercising like usual - patient will get back on track with participating in wellness 2x per week as well as completed stretches daily and getting extra exercise outside of wellness.     Objective:   Luciano completed treadmill, therapeutic stretches (LTR, ham stretch, piriformis stretch, LAVELLE) and the following MedX exercise machines: core lumbar, torso rotation l/r, leg extension, leg curl, upright row, chest press, biceps curl, triceps extension, leg press, hip abd, and hip add    See exercise log in patient folder for rate of exertion and repetitions completed.     Fitness Machine Education Key:  E-education on equipment initiated and further follow up and education needed  I-independent with  and exercise    Discussed but set up teaching not yet started    Lumbarl Ext. I Torso Rotation I Leg Press I   Leg Extension I Seated Leg Curl I Chest Press I   Seated Row I Hip ADD I Hip ABD I   Triceps Extension I  Bicep Curl I Other: X       Assessment:   Patient tolerated visit very well and wants to continue wellness to maintain strength. Patient warmed up on treadmill, stretched, and iced low back for 10 minutes after workout.     Plan:  Continue with established plan of care towards wellness goals. Patient now indep with  for majority of exercises    For health  : Madhavi Santoyo  1/2/2020

## 2020-01-07 ENCOUNTER — DOCUMENTATION ONLY (OUTPATIENT)
Dept: REHABILITATION | Facility: HOSPITAL | Age: 70
End: 2020-01-07
Attending: NURSE PRACTITIONER
Payer: MEDICARE

## 2020-01-07 NOTE — PROGRESS NOTES
Health  Wellness Visit Note    Name: Luciano Henriquez Jr.  Clinic Number: [id number]  Physician: Juan Arteaga NP  Diagnosis: No diagnosis found.  Past Medical History:   Diagnosis Date    Allergy     Hyperlipidemia     Hypertension     Reflux esophagitis      Visit Number: 31  Precautions: DM      1st PT visit:  7/6/19  Year of care end date:  7/6/20  6 Month test  Performed:  (Due Jan)  12 Month test performed:   Mind body plan: Level B, 2/week  Patient level: Level B      Time In: 11:05 AM  Time Out: 12:08 PM  Total Treatment Time: 63 minutes    Subjective:   Patient reports 3 out of 10 pain in his lower back on average. Stretching has been being completed 6 to 7 times per day, periodic quick stretches for his back to help pain, and normally doesn't stretch for long sessions at a time. Icing has not been being completed, but heat has been used occasionally. Exercise usually consists of recumbent bike for 30 min a few times per week. Stretching and exercising hasn't been completed as routinely because of the holidays, but he will be doing more soon.88    Objective:   Luciano completed treadmill, therapeutic stretches (LTR, ham stretch, piriformis stretch, LAVELLE) and the following MedX exercise machines: core lumbar, torso rotation l/r, leg extension, leg curl, upright row, chest press, biceps curl, triceps extension, leg press, hip abd, and hip add    See exercise log in patient folder for rate of exertion and repetitions completed.     Fitness Machine Education Key:  E-education on equipment initiated and further follow up and education needed  I-independent with  and exercise    Discussed but set up teaching not yet started    Lumbarl Ext. I Torso Rotation I Leg Press I   Leg Extension I Seated Leg Curl I Chest Press I   Seated Row I Hip ADD I Hip ABD I   Triceps Extension I Bicep Curl I Other: X       Assessment:   Patient tolerated visit very well and wants to continue wellness to  maintain strength. Patient warmed up on treadmill, stretched, and iced low back for 10 minutes after workout.     Plan:  Continue with established plan of care towards wellness goals. Patient now indep with  for majority of exercises    For health  : Rubens Gallo  1/7/2020

## 2020-01-13 DIAGNOSIS — I10 ESSENTIAL HYPERTENSION: ICD-10-CM

## 2020-01-13 RX ORDER — CHLORTHALIDONE 25 MG/1
25 TABLET ORAL DAILY
Qty: 90 TABLET | Refills: 3 | Status: SHIPPED | OUTPATIENT
Start: 2020-01-13 | End: 2021-01-12

## 2020-01-14 ENCOUNTER — DOCUMENTATION ONLY (OUTPATIENT)
Dept: REHABILITATION | Facility: HOSPITAL | Age: 70
End: 2020-01-14
Attending: NURSE PRACTITIONER
Payer: MEDICARE

## 2020-01-14 NOTE — PROGRESS NOTES
Health  Wellness Visit Note    Name: Luciano Henriquez Jr.  Clinic Number: [id number]  Physician: Juan Arteaga NP  Diagnosis: No diagnosis found.  Past Medical History:   Diagnosis Date    Allergy     Hyperlipidemia     Hypertension     Reflux esophagitis      Visit Number: 32  Precautions: DM      1st PT visit:  7/6/19  Year of care end date:  7/6/20  6 Month test  Performed:  (Due Jan)  12 Month test performed:   Mind body plan: Level B, 2/week  Patient level: Level B      Time In: 11:00 AM  Time Out: 12:17 PM  Total Treatment Time: 87 minutes    Subjective:   Patient reports 5 out of 10 pain in the morning with a lot of stiffness and as he goes throughout the day into the evening his pain lessens to 3 to 4 out of 10. Stretching, Icing, and Exercising has been completed a few times in the past weeks, but he will be starting to do more every week.    Objective:   Luciano completed treadmill, therapeutic stretches (LTR, ham stretch, piriformis stretch, LAVELLE) and the following [employer] exercise machines: core lumbar, torso rotation l/r, leg extension, leg curl, upright row, chest press, biceps curl, triceps extension, leg press, hip abd, and hip add    See exercise log in patient folder for rate of exertion and repetitions completed.     Fitness Machine Education Key:  E-education on equipment initiated and further follow up and education needed  I-independent with  and exercise    Discussed but set up teaching not yet started    Lumbarl Ext. I Torso Rotation I Leg Press I   Leg Extension I Seated Leg Curl I Chest Press I   Seated Row I Hip ADD I Hip ABD I   Triceps Extension I Bicep Curl I Other: X       Assessment:   Patient tolerated visit very well and wants to continue wellness to maintain strength. Patient warmed up on treadmill, stretched, and iced low back for 10 minutes after workout.     Plan:  Continue with established plan of care towards wellness goals. Patient now indep with machine  set up for majority of exercises    For health  : Rubens Gallo  1/14/2020

## 2020-01-15 ENCOUNTER — PATIENT OUTREACH (OUTPATIENT)
Dept: OTHER | Facility: OTHER | Age: 70
End: 2020-01-15

## 2020-01-16 ENCOUNTER — PATIENT MESSAGE (OUTPATIENT)
Dept: NEUROSURGERY | Facility: CLINIC | Age: 70
End: 2020-01-16

## 2020-01-16 ENCOUNTER — DOCUMENTATION ONLY (OUTPATIENT)
Dept: REHABILITATION | Facility: HOSPITAL | Age: 70
End: 2020-01-16
Attending: NURSE PRACTITIONER
Payer: MEDICARE

## 2020-01-16 NOTE — PROGRESS NOTES
Health  Wellness Visit Note    Name: Luciano Henriquez Jr.  Clinic Number: [id number]  Physician: Juan Arteaga NP  Diagnosis: No diagnosis found.  Past Medical History:   Diagnosis Date    Allergy     Hyperlipidemia     Hypertension     Reflux esophagitis      Visit Number: 33  Precautions: DM      1st PT visit:  7/6/19  Year of care end date:  7/6/20  6 Month test  Performed:  (Due Jan)  12 Month test performed: July 2020  Mind body plan: Level B, 2/week  Patient level: Level B      Time In: 11:00 AM  Time Out: 12:15 PM  Total Treatment Time: 75 minutes    Subjective:   Patient reports that his back feels sore today - patient is reporting a 4-5/10 on the pain scale. Patient has been applying ice, heat, and Biofreeze to his back - he stated that the Biofreeze gives him some temporary relief. Patient has been completing his stretches daily every morning. Patient has been exercising for 30 minutes on his recumbent bike daily as well. Patient described that his pain is the highest in the morning when he first wakes up and improves the more he moves throughout the day with increased movement and walking. Patient stated that he is able to sleep well throughout the night even with the increase in pain. Patient stated that after Tuesday's wellness session he was very sore due to the weight increase on some machines - patient was able to tolerate weight increase today with slower and controlled movements - HC encouraged patient to stretch again today to help reduce the effects of any soreness that he may feel.     Objective:   Luciano completed treadmill, therapeutic stretches (LTR, ham stretch, piriformis stretch, LAVELLE) and the following [employer] exercise machines: core lumbar, torso rotation l/r, leg extension, leg curl, upright row, chest press, biceps curl, triceps extension, leg press, hip abd, and hip add    See exercise log in patient folder for rate of exertion and repetitions completed.     Fitness Machine  Education Key:  E-education on equipment initiated and further follow up and education needed  I-independent with  and exercise    Discussed but set up teaching not yet started    Lumbarl Ext. I Torso Rotation I Leg Press I   Leg Extension I Seated Leg Curl I Chest Press I   Seated Row I Hip ADD I Hip ABD I   Triceps Extension I Bicep Curl I Other: X       Assessment:   Patient tolerated visit very well and wants to continue wellness to maintain strength. Patient warmed up on treadmill, stretched, and iced low back for 10 minutes after workout.     Plan:  Continue with established plan of care towards wellness goals. Patient now indep with  for majority of exercises      Patient's TRINITY end date will be extended 1 week to make up for the visit missed when the HC was absent.   (7/7/2020 will be his last wellness visit)    For health  : Madhavi Santoyo  1/16/2020

## 2020-01-21 ENCOUNTER — DOCUMENTATION ONLY (OUTPATIENT)
Dept: REHABILITATION | Facility: HOSPITAL | Age: 70
End: 2020-01-21
Attending: NURSE PRACTITIONER
Payer: MEDICARE

## 2020-01-21 NOTE — PROGRESS NOTES
Health  Wellness Visit Note    Name: Luciano Henriquez Jr.  Clinic Number: [id number]  Physician: Juan Arteaga NP  Diagnosis: No diagnosis found.  Past Medical History:   Diagnosis Date    Allergy     Hyperlipidemia     Hypertension     Reflux esophagitis      Visit Number: 34  Precautions: DM      1st PT visit:  7/6/19  Year of care end date:  7/6/20  6 Month test  Performed:  (Due Jan)  12 Month test performed: July 2020  Mind body plan: Level B, 2/week  Patient level: Level B      Time In: 11:02 AM  Time Out: 12:16  Total Treatment Time: 74 minutes    Subjective:   Patient reports 4 to 5 out of 10 pain in the morning and 3 out of 10 pain in the evening in his lower back. Stretching and Exercising has been being completed daily and exercise consists of cardio for 30 minutes on recumbent bike. Icing has been being completed throughout the week when he has pain and he has been using Biofreeze. HC talked to patient about Meditation Week and he seemed very interested to talk about it. He was also excited to here more of what is coming each week for wellness flyers. Patient overall feels well.    Objective:   Luciano completed treadmill, therapeutic stretches (LTR, ham stretch, piriformis stretch, LAVELLE) and the following [employer] exercise machines: core lumbar, torso rotation l/r, leg extension, leg curl, upright row, chest press, biceps curl, triceps extension, leg press, hip abd, and hip add    See exercise log in patient folder for rate of exertion and repetitions completed.     Fitness Machine Education Key:  E-education on equipment initiated and further follow up and education needed  I-independent with  and exercise    Discussed but set up teaching not yet started    Lumbarl Ext. I Torso Rotation I Leg Press I   Leg Extension I Seated Leg Curl I Chest Press I   Seated Row I Hip ADD I Hip ABD I   Triceps Extension I Bicep Curl I Other: X       Assessment:   Patient tolerated visit very well and  wants to continue wellness to maintain strength. Patient warmed up on treadmill, stretched, and iced low back for 10 minutes after workout.     Plan:  Continue with established plan of care towards wellness goals. Patient now indep with  for majority of exercises      Patient's TRINITY end date will be extended 1 week to make up for the visit missed when the HC was absent.   (7/7/2020 will be his last wellness visit)    For health  : Rubens Gallo  1/21/2020

## 2020-01-23 ENCOUNTER — TELEPHONE (OUTPATIENT)
Dept: PAIN MEDICINE | Facility: CLINIC | Age: 70
End: 2020-01-23

## 2020-01-23 ENCOUNTER — DOCUMENTATION ONLY (OUTPATIENT)
Dept: REHABILITATION | Facility: HOSPITAL | Age: 70
End: 2020-01-23
Attending: NURSE PRACTITIONER
Payer: MEDICARE

## 2020-01-23 NOTE — PROGRESS NOTES
Health  Wellness Visit Note    Name: Luciano Henriquez Jr.  Clinic Number: [id number]  Physician: Juna Arteaga NP  Diagnosis: No diagnosis found.  Past Medical History:   Diagnosis Date    Allergy     Hyperlipidemia     Hypertension     Reflux esophagitis      Visit Number: 34  Precautions: DM      1st PT visit:  7/6/19  Year of care end date:  7/6/20  6 Month test  Performed:  (Due Jan)  12 Month test performed: July 2020  Mind body plan: Level B, 2/week  Patient level: Level B      Time In: 11:05 AM  Time Out: 12:15 PM  Total Treatment Time: 70 minutes    Subjective:   Patient reports 4 to 5 out of 10 pain in the morning and 3 out of 10 pain in the evening in his lower back. Stretching and exercising has been completed daily and exercise consists of cardio for 30 minutes on recumbent bike. Icing has been being completed throughout the week when he has pain and he has been using Biofreeze. Patient overall feels well. Follow up with patient regarding meditation flyer and if any suggestions for practice were completed. Patient is going in for a procedure on the 29th for a nerve block on his lower back with the hope that he is able to have an ablation procedure if this procedure goes well.    Objective:   Luciano completed treadmill, therapeutic stretches (LTR, ham stretch, piriformis stretch, LAVELLE) and the following [employer] exercise machines: core lumbar, torso rotation l/r, leg extension, leg curl, upright row, chest press, biceps curl, triceps extension, leg press, hip abd, and hip add    See exercise log in patient folder for rate of exertion and repetitions completed.     Fitness Machine Education Key:  E-education on equipment initiated and further follow up and education needed  I-independent with  and exercise    Discussed but set up teaching not yet started    Lumbarl Ext. I Torso Rotation I Leg Press I   Leg Extension I Seated Leg Curl I Chest Press I   Seated Row I Hip ADD I Hip ABD I    Triceps Extension I Bicep Curl I Other: X       Assessment:   Patient tolerated visit very well and wants to continue wellness to maintain strength. Patient warmed up on treadmill, stretched, and iced low back for 10 minutes after workout.     Plan:  Continue with established plan of care towards wellness goals. Patient now indep with  for majority of exercises      Patient's TRINITY end date will be extended 1 week to make up for the visit missed when the HC was absent.   (7/7/2020 will be his last wellness visit)    For health  : Madhavi Santoyo  1/23/2020

## 2020-01-28 ENCOUNTER — DOCUMENTATION ONLY (OUTPATIENT)
Dept: REHABILITATION | Facility: HOSPITAL | Age: 70
End: 2020-01-28
Attending: NURSE PRACTITIONER
Payer: MEDICARE

## 2020-01-28 NOTE — PROGRESS NOTES
Health  Wellness Visit Note    Name: Luciano Henriquez Jr.  Clinic Number: [id number]  Physician: Juan Arteaga NP  Diagnosis: No diagnosis found.  Past Medical History:   Diagnosis Date    Allergy     Hyperlipidemia     Hypertension     Reflux esophagitis      Visit Number: 36  Precautions: DM      1st PT visit:  7/6/19  Year of care end date:  7/6/20  6 Month test  Performed:  (Due Jan)  12 Month test performed: July 2020  Mind body plan: Level B, 2/week  Patient level: Level B      Time In: 11:02 AM  Time Out: 12:09 PM  Total Treatment Time: 67 minutes    Subjective:   Patient reports 4 out of 10 pain in the morning and 2 to 3 out of 10 pain in the evening in lower back. Stretching, Icing, and Exercising have not been being completed as much as usual, but will continue to do more soon. HC talked to patient about Kindness Week and he seemed happy to talk about it. Patient reported trying to follow up on the weekly flyers. Patient is going in for a procedure on the 29th for a nerve block on his lower back with the hope that he is able to have an ablation procedure if this procedure goes well.    Objective:   Luciano completed treadmill, therapeutic stretches (LTR, ham stretch, piriformis stretch, LAVELLE) and the following [employer] exercise machines: core lumbar, torso rotation l/r, leg extension, leg curl, upright row, chest press, biceps curl, triceps extension, leg press, hip abd, and hip add    See exercise log in patient folder for rate of exertion and repetitions completed.     Fitness Machine Education Key:  E-education on equipment initiated and further follow up and education needed  I-independent with  and exercise    Discussed but set up teaching not yet started    Lumbarl Ext. I Torso Rotation I Leg Press I   Leg Extension I Seated Leg Curl I Chest Press I   Seated Row I Hip ADD I Hip ABD I   Triceps Extension I Bicep Curl I Other: X       Assessment:   Patient tolerated visit very well  and wants to continue wellness to maintain strength. Patient warmed up on treadmill, stretched, and iced low back for 10 minutes after workout.     Plan:  Continue with established plan of care towards wellness goals. Patient now indep with  for majority of exercises      Patient's TRINITY end date will be extended 1 week to make up for the visit missed when the HC was absent.   (7/7/2020 will be his last wellness visit)    For health  : Rubens Gallo  1/28/2020

## 2020-01-29 ENCOUNTER — HOSPITAL ENCOUNTER (OUTPATIENT)
Facility: HOSPITAL | Age: 70
Discharge: HOME | End: 2020-01-29
Attending: PAIN MEDICINE | Admitting: PAIN MEDICINE
Payer: MEDICARE

## 2020-01-29 VITALS
SYSTOLIC BLOOD PRESSURE: 148 MMHG | TEMPERATURE: 98 F | RESPIRATION RATE: 18 BRPM | HEART RATE: 74 BPM | DIASTOLIC BLOOD PRESSURE: 74 MMHG | OXYGEN SATURATION: 98 %

## 2020-01-29 DIAGNOSIS — M47.816 LUMBAR SPONDYLOSIS: Primary | ICD-10-CM

## 2020-01-29 PROCEDURE — 64493 INJ PARAVERT F JNT L/S 1 LEV: CPT | Mod: 50 | Performed by: PAIN MEDICINE

## 2020-01-29 PROCEDURE — 64493 INJ PARAVERT F JNT L/S 1 LEV: CPT | Mod: 50,,, | Performed by: PAIN MEDICINE

## 2020-01-29 PROCEDURE — 64494 PR INJ DX/THER AGNT PARAVERT FACET JOINT,IMG GUIDE,LUMBAR/SAC, 2ND LEVEL: ICD-10-PCS | Mod: 50,,, | Performed by: PAIN MEDICINE

## 2020-01-29 PROCEDURE — 64494 INJ PARAVERT F JNT L/S 2 LEV: CPT | Mod: 50,,, | Performed by: PAIN MEDICINE

## 2020-01-29 PROCEDURE — 64494 INJ PARAVERT F JNT L/S 2 LEV: CPT | Mod: 50 | Performed by: PAIN MEDICINE

## 2020-01-29 PROCEDURE — 64493 PR INJ DX/THER AGNT PARAVERT FACET JOINT,IMG GUIDE,LUMBAR/SAC,1ST LVL: ICD-10-PCS | Mod: 50,,, | Performed by: PAIN MEDICINE

## 2020-01-29 PROCEDURE — 25000003 PHARM REV CODE 250: Performed by: PAIN MEDICINE

## 2020-01-29 PROCEDURE — 64495 INJ PARAVERT F JNT L/S 3 LEV: CPT | Mod: 50 | Performed by: PAIN MEDICINE

## 2020-01-29 RX ORDER — LIDOCAINE HYDROCHLORIDE 20 MG/ML
10 INJECTION, SOLUTION INFILTRATION; PERINEURAL ONCE
Status: COMPLETED | OUTPATIENT
Start: 2020-01-29 | End: 2020-01-29

## 2020-01-29 RX ORDER — BUPIVACAINE HYDROCHLORIDE 2.5 MG/ML
INJECTION, SOLUTION EPIDURAL; INFILTRATION; INTRACAUDAL
Status: DISCONTINUED
Start: 2020-01-29 | End: 2020-01-29 | Stop reason: HOSPADM

## 2020-01-29 RX ORDER — BUPIVACAINE HYDROCHLORIDE 2.5 MG/ML
10 INJECTION, SOLUTION EPIDURAL; INFILTRATION; INTRACAUDAL ONCE
Status: COMPLETED | OUTPATIENT
Start: 2020-01-29 | End: 2020-01-29

## 2020-01-29 RX ORDER — LIDOCAINE HYDROCHLORIDE 20 MG/ML
INJECTION, SOLUTION INFILTRATION; PERINEURAL
Status: DISCONTINUED
Start: 2020-01-29 | End: 2020-01-29 | Stop reason: HOSPADM

## 2020-01-29 NOTE — H&P
Subjective:     Patient ID: Luciano Henriquez Jr. is a 69 y.o. male    Chief Complaint: Low back pain    Referred by: Jorge Warner Jr*      HPI:    Luciano Henriquez Jr. is a 69 y.o. male who presents today for bilateral L3, L4 and L5 MBBs. He denies any changes in the quality or location of the pain.        Review of Systems   Constitutional: Negative for activity change, appetite change, chills, fatigue, fever and unexpected weight change.   HENT: Negative for hearing loss.    Eyes: Negative for visual disturbance.   Respiratory: Negative for chest tightness and shortness of breath.    Cardiovascular: Negative for chest pain.   Gastrointestinal: Negative for abdominal pain, constipation, diarrhea, nausea and vomiting.   Genitourinary: Negative for difficulty urinating.   Musculoskeletal: Positive for back pain, gait problem and myalgias. Negative for neck pain.   Skin: Negative for rash.   Neurological: Negative for dizziness, weakness, light-headedness, numbness and headaches.   Psychiatric/Behavioral: Positive for sleep disturbance. Negative for hallucinations and suicidal ideas. The patient is not nervous/anxious.        Past Medical History:   Diagnosis Date    Allergy     Hyperlipidemia     Hypertension     Reflux esophagitis        Past Surgical History:   Procedure Laterality Date    arthroscopic on left knee      CARPAL TUNNEL RELEASE      right hand only    deviated septum repair      EPIDURAL STEROID INJECTION N/A 10/23/2019    Procedure: Injection, Steroid, Epidural Caudal;  Surgeon: Jorge Warner Jr., MD;  Location: [facility] ENDO;  Service: Pain Management;  Laterality: N/A;  Caudal BETO    [id number]    Arrive @ 0800 (morning request); IV Sedation- Fasting; ASA last 10/15; No DM    EPIDURAL STEROID INJECTION N/A 11/27/2019    Procedure: Injection, Steroid, Epidural Caudal;  Surgeon: Jorge Warner Jr., MD;  Location: [facility] ENDO;  Service: Pain Management;  Laterality: N/A;  Caudal  BETO    [id number]    Arrive @ 0830; IV Sedation- Fasting (F-50, V-1); ASA last 11/19; No DM    HERNIA REPAIR Left     inguinal    VASECTOMY         Social History     Socioeconomic History    Marital status:      Spouse name: Not on file    Number of children: Not on file    Years of education: Not on file    Highest education level: Not on file   Occupational History    Not on file   Social Needs    Financial resource strain: Not on file    Food insecurity:     Worry: Not on file     Inability: Not on file    Transportation needs:     Medical: Not on file     Non-medical: Not on file   Tobacco Use    Smoking status: Never Smoker    Smokeless tobacco: Never Used    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     Substance and Sexual Activity    Alcohol use: Yes     Alcohol/week: 14.0 standard drinks     Types: 14 Glasses of wine per week     Comment: Drinks 2 glasses of wine per night    Drug use: No    Sexual activity: Yes     Partners: Female   Lifestyle    Physical activity:     Days per week: Not on file     Minutes per session: Not on file    Stress: Not on file   Relationships    Social connections:     Talks on phone: Not on file     Gets together: Not on file     Attends Gnosticist service: Not on file     Active member of club or organization: Not on file     Attends meetings of clubs or organizations: Not on file     Relationship status: Not on file   Other Topics Concern    Not on file   Social History Narrative    Not on file       Review of patient's allergies indicates:  No Known Allergies    No current facility-administered medications on file prior to encounter.      Current Outpatient Medications on File Prior to Encounter   Medication Sig Dispense Refill    amitriptyline (ELAVIL) 100 MG tablet       amLODIPine (NORVASC) 10 MG tablet Take 1 tablet (10 mg total) by mouth once daily. 90 tablet 3    ascorbic acid (VITAMIN C) 500 MG tablet Take 500 mg by  mouth 2 (two) times daily.      aspirin (ECOTRIN) 81 MG EC tablet Take 81 mg by mouth once daily.      azelastine (ASTELIN) 137 mcg (0.1 %) nasal spray 1 spray (137 mcg total) by Nasal route 2 (two) times daily. 30 mL 1    fish oil-omega-3 fatty acids 300-1,000 mg capsule Take 2 g by mouth once daily.      multivitamin capsule Take 1 capsule by mouth once daily.      olmesartan (BENICAR) 40 MG tablet Take 1 tablet (40 mg total) by mouth once daily. 90 tablet 3    rosuvastatin (CRESTOR) 10 MG tablet Take 1 tablet (10 mg total) by mouth every evening. 90 tablet 3    sucralfate (CARAFATE) 1 gram tablet Take 1 tablet (1 g total) by mouth 4 (four) times daily before meals and nightly. 360 tablet 2    tiZANidine (ZANAFLEX) 4 MG tablet Take 1 tablet (4 mg total) by mouth every 8 (eight) hours as needed. 90 tablet 5    albuterol (VENTOLIN HFA) 90 mcg/actuation inhaler Inhale 2 puffs into the lungs every 6 (six) hours as needed for Wheezing or Shortness of Breath. Rescue 18 g 0    BOOSTRIX TDAP 2.5-8-5 Lf-mcg-Lf/0.5mL Syrg injection       doxepin (ZONALON) 5 % cream          Objective:      /69 (BP Location: Left arm, Patient Position: Sitting)   Pulse 67   Temp 98.2 °F (36.8 °C) (Oral)   Resp 18   SpO2 95%     Exam:  AAOx3 NAD  Mood and affect normal  Memory and language intact  Breathing non labored      Assessment:       Lumbar spondylosis      Plan:         Luciano Wayroe Martinez Saxena is a 69 y.o. male with lumbar facet pain.    Proceed with MBBs as planned.

## 2020-01-29 NOTE — H&P (VIEW-ONLY)
Subjective:     Patient ID: Luciano Henriquez Jr. is a 69 y.o. male    Chief Complaint: Low back pain    Referred by: Jorge Warner Jr*      HPI:    Luciano Henriquez Jr. is a 69 y.o. male who presents today for bilateral L3, L4 and L5 MBBs. He denies any changes in the quality or location of the pain.        Review of Systems   Constitutional: Negative for activity change, appetite change, chills, fatigue, fever and unexpected weight change.   HENT: Negative for hearing loss.    Eyes: Negative for visual disturbance.   Respiratory: Negative for chest tightness and shortness of breath.    Cardiovascular: Negative for chest pain.   Gastrointestinal: Negative for abdominal pain, constipation, diarrhea, nausea and vomiting.   Genitourinary: Negative for difficulty urinating.   Musculoskeletal: Positive for back pain, gait problem and myalgias. Negative for neck pain.   Skin: Negative for rash.   Neurological: Negative for dizziness, weakness, light-headedness, numbness and headaches.   Psychiatric/Behavioral: Positive for sleep disturbance. Negative for hallucinations and suicidal ideas. The patient is not nervous/anxious.        Past Medical History:   Diagnosis Date    Allergy     Hyperlipidemia     Hypertension     Reflux esophagitis        Past Surgical History:   Procedure Laterality Date    arthroscopic on left knee      CARPAL TUNNEL RELEASE      right hand only    deviated septum repair      EPIDURAL STEROID INJECTION N/A 10/23/2019    Procedure: Injection, Steroid, Epidural Caudal;  Surgeon: Jorge Warner Jr., MD;  Location: [facility] ENDO;  Service: Pain Management;  Laterality: N/A;  Caudal BETO    [id number]    Arrive @ 0800 (morning request); IV Sedation- Fasting; ASA last 10/15; No DM    EPIDURAL STEROID INJECTION N/A 11/27/2019    Procedure: Injection, Steroid, Epidural Caudal;  Surgeon: Jorge Warner Jr., MD;  Location: [facility] ENDO;  Service: Pain Management;  Laterality: N/A;  Caudal  BETO    [id number]    Arrive @ 0830; IV Sedation- Fasting (F-50, V-1); ASA last 11/19; No DM    HERNIA REPAIR Left     inguinal    VASECTOMY         Social History     Socioeconomic History    Marital status:      Spouse name: Not on file    Number of children: Not on file    Years of education: Not on file    Highest education level: Not on file   Occupational History    Not on file   Social Needs    Financial resource strain: Not on file    Food insecurity:     Worry: Not on file     Inability: Not on file    Transportation needs:     Medical: Not on file     Non-medical: Not on file   Tobacco Use    Smoking status: Never Smoker    Smokeless tobacco: Never Used    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     Substance and Sexual Activity    Alcohol use: Yes     Alcohol/week: 14.0 standard drinks     Types: 14 Glasses of wine per week     Comment: Drinks 2 glasses of wine per night    Drug use: No    Sexual activity: Yes     Partners: Female   Lifestyle    Physical activity:     Days per week: Not on file     Minutes per session: Not on file    Stress: Not on file   Relationships    Social connections:     Talks on phone: Not on file     Gets together: Not on file     Attends Gnosticism service: Not on file     Active member of club or organization: Not on file     Attends meetings of clubs or organizations: Not on file     Relationship status: Not on file   Other Topics Concern    Not on file   Social History Narrative    Not on file       Review of patient's allergies indicates:  No Known Allergies    No current facility-administered medications on file prior to encounter.      Current Outpatient Medications on File Prior to Encounter   Medication Sig Dispense Refill    amitriptyline (ELAVIL) 100 MG tablet       amLODIPine (NORVASC) 10 MG tablet Take 1 tablet (10 mg total) by mouth once daily. 90 tablet 3    ascorbic acid (VITAMIN C) 500 MG tablet Take 500 mg by  mouth 2 (two) times daily.      aspirin (ECOTRIN) 81 MG EC tablet Take 81 mg by mouth once daily.      azelastine (ASTELIN) 137 mcg (0.1 %) nasal spray 1 spray (137 mcg total) by Nasal route 2 (two) times daily. 30 mL 1    fish oil-omega-3 fatty acids 300-1,000 mg capsule Take 2 g by mouth once daily.      multivitamin capsule Take 1 capsule by mouth once daily.      olmesartan (BENICAR) 40 MG tablet Take 1 tablet (40 mg total) by mouth once daily. 90 tablet 3    rosuvastatin (CRESTOR) 10 MG tablet Take 1 tablet (10 mg total) by mouth every evening. 90 tablet 3    sucralfate (CARAFATE) 1 gram tablet Take 1 tablet (1 g total) by mouth 4 (four) times daily before meals and nightly. 360 tablet 2    tiZANidine (ZANAFLEX) 4 MG tablet Take 1 tablet (4 mg total) by mouth every 8 (eight) hours as needed. 90 tablet 5    albuterol (VENTOLIN HFA) 90 mcg/actuation inhaler Inhale 2 puffs into the lungs every 6 (six) hours as needed for Wheezing or Shortness of Breath. Rescue 18 g 0    BOOSTRIX TDAP 2.5-8-5 Lf-mcg-Lf/0.5mL Syrg injection       doxepin (ZONALON) 5 % cream          Objective:      /69 (BP Location: Left arm, Patient Position: Sitting)   Pulse 67   Temp 98.2 °F (36.8 °C) (Oral)   Resp 18   SpO2 95%     Exam:  AAOx3 NAD  Mood and affect normal  Memory and language intact  Breathing non labored      Assessment:       Lumbar spondylosis      Plan:         Luciano Wayroe Martinez Saxena is a 69 y.o. male with lumbar facet pain.    Proceed with MBBs as planned.

## 2020-01-29 NOTE — DISCHARGE INSTRUCTIONS

## 2020-01-29 NOTE — OP NOTE
LUMBAR Medial Branch Block Under Fluoroscopy  Time-out taken to identify patient and procedure side prior to starting the procedure.   I attest that I have reviewed the patient's home medications prior to the procedure and no contraindication have been identified. I  re-evaluated the patient after the patient was positioned for the procedure in the procedure room immediately before the procedural time-out. The vital signs are current and represent the current state of the patient which has not significantly changed since the preprocedure assessment.            Date of Service: 01/29/2020    PCP: Marcelino Iglesias MD    Referring Physician:                                                           PROCEDURE:  Bilateral  L3, L4 and L5 medial branch block    REASON FOR PROCEDURE: Lumbar spondylosis [M47.816]    PHYSICIAN: Jorge Warner MD  ASSISTANTS: None    MEDICATIONS INJECTED: Bupivicaine 0.25% 1.5ml at each level      LOCAL ANESTHETIC USED: Xylocaine 2% 3ml each site.    SEDATION MEDICATIONS: None    ESTIMATED BLOOD LOSS:  None.    COMPLICATIONS:  None.    TECHNIQUE: Laying in a prone position, the patient was prepped and draped in the usual sterile fashion using ChloraPrep and fenestrated drape.  The level was determined under fluoroscopic guidance.  Local anesthetic was given by going down to the hub of the 27-gauge 1.25in needle and raising a wheel.  A 22-gauge 3.5inch needle was introduced to the anatomic local of the medial branch at the lateral mass utilizing live fluoroscopy. Medication was then injected slowly at each level as stated above. The patient tolerated the procedure well.       The patient was monitored after the procedure.  Patient was given post procedure and discharge instructions to follow at home.  We will see the patient back in two weeks or the patient may call to inform of status. The patient was discharged in a stable condition

## 2020-01-30 ENCOUNTER — TELEPHONE (OUTPATIENT)
Dept: PAIN MEDICINE | Facility: CLINIC | Age: 70
End: 2020-01-30

## 2020-01-30 DIAGNOSIS — M47.816 LUMBAR SPONDYLOSIS: Primary | ICD-10-CM

## 2020-01-30 NOTE — TELEPHONE ENCOUNTER
Mr. Henriquez reports ~75% reduction in pain following the MBB performed yesterday.  He was able to bend over easily and without pain, as well as perform his usual tasks.  His pain has since returned to it usual intensity.  He would like to schedule the right RFA on 2/19/2020- MD updated.

## 2020-02-04 ENCOUNTER — TELEPHONE (OUTPATIENT)
Dept: PAIN MEDICINE | Facility: CLINIC | Age: 70
End: 2020-02-04

## 2020-02-04 ENCOUNTER — DOCUMENTATION ONLY (OUTPATIENT)
Dept: REHABILITATION | Facility: HOSPITAL | Age: 70
End: 2020-02-04
Attending: NURSE PRACTITIONER
Payer: MEDICARE

## 2020-02-04 NOTE — PROGRESS NOTES
Health  Wellness Visit Note        Name: Luciano Henriquez Jr.  Clinic Number: [id number]  Physician: Juan Arteaga NP  Diagnosis: No diagnosis found.  Past Medical History:   Diagnosis Date    Allergy     Hyperlipidemia     Hypertension     Reflux esophagitis      Visit Number: 36  Precautions: DM      1st PT visit:  7/6/19  Year of care end date:  7/6/20  6 Month test  Performed: 2/4/2020  12 Month test performed: Due July 2020  Mind body plan: Plan B, 2/week  Patient level: Level B      Time In: 11:04 AM  Time Out: 12:09 PM  Total Treatment Time: 65 minutes    [employer] 2020  Handout on this week's wellness topic [place] Strength provided along with a discussion on what it means, the benefits, and suggestions for practice.  Reviewed last week's topic of Kindness and patient did follow through with acts of kindness last week.    Subjective:   Patient reports 4 out of 10 pain in lower back and has been feeling that constantly. Stretching and Exercising have been being completed minimally throughout the week and consists of mainly cardio. Patient reports that he wants to do more stretching and exercising in the upcoming weeks. Icing has not been being completed much. Patient is going in for a procedure on the 29th for a nerve block on his lower back with the hope that he is able to have an ablation procedure if this procedure goes well.    Objective:   Luciano completed treadmill, therapeutic stretches (LTR, ham stretch, piriformis stretch, LAVELLE) and the following MedX exercise machines: core lumbar, torso rotation l/r, leg extension, leg curl, upright row, chest press, biceps curl, triceps extension, leg press, hip abd, and hip add    See exercise log in patient folder for rate of exertion and repetitions completed.     Fitness Machine Education Key:  E-education on equipment initiated and further follow up and education needed  I-independent with  and exercise    Discussed but set up  teaching not yet started    Lumbarl Ext. I Torso Rotation I Leg Press I   Leg Extension I Seated Leg Curl I Chest Press I   Seated Row I Hip ADD I Hip ABD I   Triceps Extension I Bicep Curl I Other: X     [facility] 2/4/2019   Visit Number 180   VAS Pain Rating 4   Treadmill Time (in min.) 10   Speed 2   Incline 0   Recumbent Bike Seat Pos. -   Time -   Flexion in Lying 20   Manual Therapy 0   Lumbar Extension Seat Pad 0   Femur Restraint 5   Top Dead Center 24   Counterweight 140   Lumbar Flexion 63   Lumbar Extension 0   Lumbar Peak Torque 210   Min Torque 50   Test Percent Below Normative Data 40   Test Percent Gain in Strength from Initial  40   Lumbar Weight -   Repetitions -   Rating of Perceived Exertion -   Ice - Z Lie (in min.) 5       Assessment:   Patient tolerated visit very well and wants to continue wellness to maintain strength. Patient warmed up on treadmill, stretched, and iced low back for 5 minutes after workout. Retest on MedX test equipment was performed today for 6 month follow up in [facility].   Patient received education on the purpose of retesting on MedX equipment  for assessing maintenance of strength. Correct and safe technique during testing was discussed and ensured.  Reviewed that patient should feel support/pressure from MedX restraints but no pain or discomfort and patient expressed understanding.  Results of test demonstrate expected strength gains from initial test and were discussed with the patient. Patient has gained strength and is 40% above past strength average.    Plan:  Continue with established plan of care towards wellness goals. Patient now indep with  for majority of exercises      Patient's [facility] end date will be extended 1 week to make up for the visit missed when the HC was absent.   (7/7/2020 will be his last wellness visit)    For health  : Rubens Gallo  2/4/2020

## 2020-02-04 NOTE — TELEPHONE ENCOUNTER
Offered Mr. Henriquez an earlier date for his procedure of 2/12/2020- he accepted.    Reviewed pre-procedure instructions with him, as well as provided arrival time of 0700.  All questions answered- patient verbalized understanding.

## 2020-02-04 NOTE — PROGRESS NOTES
Digital Medicine: Health  Follow-Up      Follow Up  Follow-up reason(s): reading review and routine education    Pt continues to struggle with back pain, but states that overall he is feeling well and is pleased with his BP readings. His 30 day BP average 129/78 mmHg is at goal, <130/80 mmHg. He has no new health goals to establish currently, and no questions for improving dietary habits or physical activity. Will continue to follow up.    INTERVENTION(S)  encouragement/support and denied questions    PLAN  continue monitoring    There are no preventive care reminders to display for this patient.    Last 5 Patient Entered Readings                                      Current 30 Day Average: 129/78     Recent Readings 2/4/2020 1/26/2020 1/15/2020 1/6/2020 12/28/2019    SBP (mmHg) 135 127 115 137 118    DBP (mmHg) 80 78 74 79 73    Pulse 64 62 66 67 60            Diet Screening   No change to diet.      Physical Activity Screening   No change to exercise routine.        SDOH: Deferred.

## 2020-02-06 ENCOUNTER — DOCUMENTATION ONLY (OUTPATIENT)
Dept: REHABILITATION | Facility: HOSPITAL | Age: 70
End: 2020-02-06
Attending: NURSE PRACTITIONER
Payer: MEDICARE

## 2020-02-06 NOTE — PROGRESS NOTES
Health  Wellness Visit Note    Name: Luciano Henriquez Jr.  Clinic Number: [id number]  Physician: Juan Arteaga NP  Diagnosis: No diagnosis found.  Past Medical History:   Diagnosis Date    Allergy     Hyperlipidemia     Hypertension     Reflux esophagitis      Visit Number: 36  Precautions: DM      1st PT visit:  7/6/19  Year of care end date:  7/6/20  6 Month test  Performed: Completed 2/4/2020   12 Month test performed: July 2020  Mind body plan:Plan B, 2/week  Patient level: Level A      Time In: 11:00 AM  Time Out: 12:08 PM  Total Treatment Time: 68 minutes     [employer] 2020  Handout on this week's wellness topic strength provided  along with a discussion on what it means, the benefits, and suggestions for practice.  Reviewed last week's topic of kindness.   **patient was given handout on Tuesday appt**    Subjective:    Patient reports that his low back has a mild soreness - patient reports a 4/10 on the pain scale. Patient stated that his low back pain at the end of the day is usually between a 2-3/10. Movement helps reduce his discomfort level the most. Patient has been stretching and exercising daily - patient rides his stationary bike every morning for 30 minutes following his stretches. Patient does not ice at home regularly but has been using bio freeze which has helped. Patient went in for an exploratory nerve block procedure on the 29th and had temporary relief from his discomfort - patient will be going in for an ablation on February 12th and is hoping this first step will make him more comfortable. Patient overall is feeling well - machines were more difficult than usual today due to the gap he had in wellness appointments.     Objective:   Luciano completed treadmill, therapeutic stretches (LTR, ham stretch, piriformis stretch, LAVELLE) and the following MedX exercise machines: core lumbar, torso rotation l/r, leg extension, leg curl, upright row, chest press, biceps curl, triceps  extension, leg press, hip abd, and hip add    See exercise log in patient folder for rate of exertion and repetitions completed.     Fitness Machine Education Key:  E-education on equipment initiated and further follow up and education needed  I-independent with  and exercise    Discussed but set up teaching not yet started    Lumbarl Ext. I Torso Rotation I Leg Press I   Leg Extension I Seated Leg Curl I Chest Press I   Seated Row I Hip ADD I Hip ABD I   Triceps Extension I Bicep Curl I Other: X       Assessment:   Patient tolerated visit very well and wants to continue wellness to maintain strength. Patient warmed up on treadmill, stretched, and iced low back for 10 minutes after workout.     Plan:  Continue with established plan of care towards wellness goals. Patient is indep with .    Patient's [facility] end date will be extended 1 week to make up for the visit missed when the HC was absent.   (7/7/2020 will be his last wellness visit)    For health  : Madhavi Santoyo  2/6/2020

## 2020-02-10 DIAGNOSIS — J30.2 SEASONAL ALLERGIES: ICD-10-CM

## 2020-02-10 RX ORDER — AZELASTINE 1 MG/ML
SPRAY, METERED NASAL
Qty: 30 ML | Refills: 0 | Status: SHIPPED | OUTPATIENT
Start: 2020-02-10

## 2020-02-12 ENCOUNTER — HOSPITAL ENCOUNTER (OUTPATIENT)
Facility: HOSPITAL | Age: 70
Discharge: HOME | End: 2020-02-12
Attending: PAIN MEDICINE | Admitting: PAIN MEDICINE
Payer: MEDICARE

## 2020-02-12 VITALS
RESPIRATION RATE: 18 BRPM | OXYGEN SATURATION: 95 % | SYSTOLIC BLOOD PRESSURE: 130 MMHG | DIASTOLIC BLOOD PRESSURE: 71 MMHG | HEART RATE: 60 BPM | TEMPERATURE: 98 F

## 2020-02-12 DIAGNOSIS — M47.816 LUMBAR SPONDYLOSIS: Primary | ICD-10-CM

## 2020-02-12 PROCEDURE — 99152 MOD SED SAME PHYS/QHP 5/>YRS: CPT | Mod: ,,, | Performed by: PAIN MEDICINE

## 2020-02-12 PROCEDURE — 99152 PR MOD CONSCIOUS SEDATION, SAME PHYS, 5+ YRS, FIRST 15 MIN: ICD-10-PCS | Mod: ,,, | Performed by: PAIN MEDICINE

## 2020-02-12 PROCEDURE — 64635 DESTROY LUMB/SAC FACET JNT: CPT | Performed by: PAIN MEDICINE

## 2020-02-12 PROCEDURE — 64636 DESTROY L/S FACET JNT ADDL: CPT | Performed by: PAIN MEDICINE

## 2020-02-12 PROCEDURE — 64635 PR DESTROY LUMB/SAC FACET JNT: ICD-10-PCS | Mod: RT,,, | Performed by: PAIN MEDICINE

## 2020-02-12 PROCEDURE — 63600175 PHARM REV CODE 636 W HCPCS: Performed by: PAIN MEDICINE

## 2020-02-12 PROCEDURE — 25000003 PHARM REV CODE 250: Performed by: PAIN MEDICINE

## 2020-02-12 PROCEDURE — 99152 MOD SED SAME PHYS/QHP 5/>YRS: CPT | Performed by: PAIN MEDICINE

## 2020-02-12 PROCEDURE — 64636 PR DESTROY L/S FACET JNT ADDL: ICD-10-PCS | Mod: RT,,, | Performed by: PAIN MEDICINE

## 2020-02-12 PROCEDURE — 64635 DESTROY LUMB/SAC FACET JNT: CPT | Mod: RT,,, | Performed by: PAIN MEDICINE

## 2020-02-12 PROCEDURE — 64636 DESTROY L/S FACET JNT ADDL: CPT | Mod: RT,,, | Performed by: PAIN MEDICINE

## 2020-02-12 RX ORDER — BUPIVACAINE HYDROCHLORIDE 2.5 MG/ML
10 INJECTION, SOLUTION EPIDURAL; INFILTRATION; INTRACAUDAL ONCE
Status: COMPLETED | OUTPATIENT
Start: 2020-02-12 | End: 2020-02-12

## 2020-02-12 RX ORDER — FENTANYL CITRATE 50 UG/ML
INJECTION, SOLUTION INTRAMUSCULAR; INTRAVENOUS
Status: DISCONTINUED
Start: 2020-02-12 | End: 2020-02-12 | Stop reason: HOSPADM

## 2020-02-12 RX ORDER — TRIAMCINOLONE ACETONIDE 40 MG/ML
40 INJECTION, SUSPENSION INTRA-ARTICULAR; INTRAMUSCULAR ONCE
Status: COMPLETED | OUTPATIENT
Start: 2020-02-12 | End: 2020-02-12

## 2020-02-12 RX ORDER — MIDAZOLAM HYDROCHLORIDE 1 MG/ML
INJECTION INTRAMUSCULAR; INTRAVENOUS
Status: DISCONTINUED
Start: 2020-02-12 | End: 2020-02-12 | Stop reason: HOSPADM

## 2020-02-12 RX ORDER — LIDOCAINE HYDROCHLORIDE 20 MG/ML
10 INJECTION, SOLUTION INFILTRATION; PERINEURAL ONCE
Status: COMPLETED | OUTPATIENT
Start: 2020-02-12 | End: 2020-02-12

## 2020-02-12 RX ORDER — MIDAZOLAM HYDROCHLORIDE 1 MG/ML
5 INJECTION INTRAMUSCULAR; INTRAVENOUS
Status: ACTIVE | OUTPATIENT
Start: 2020-02-12

## 2020-02-12 RX ORDER — SODIUM CHLORIDE 9 MG/ML
INJECTION, SOLUTION INTRAVENOUS CONTINUOUS
Status: ACTIVE | OUTPATIENT
Start: 2020-02-12

## 2020-02-12 RX ORDER — TRIAMCINOLONE ACETONIDE 40 MG/ML
INJECTION, SUSPENSION INTRA-ARTICULAR; INTRAMUSCULAR
Status: DISCONTINUED
Start: 2020-02-12 | End: 2020-02-12 | Stop reason: HOSPADM

## 2020-02-12 RX ORDER — BUPIVACAINE HYDROCHLORIDE 2.5 MG/ML
INJECTION, SOLUTION EPIDURAL; INFILTRATION; INTRACAUDAL
Status: DISCONTINUED
Start: 2020-02-12 | End: 2020-02-12 | Stop reason: HOSPADM

## 2020-02-12 RX ORDER — LIDOCAINE HYDROCHLORIDE 20 MG/ML
INJECTION, SOLUTION INFILTRATION; PERINEURAL
Status: DISCONTINUED
Start: 2020-02-12 | End: 2020-02-12 | Stop reason: HOSPADM

## 2020-02-12 RX ORDER — FENTANYL CITRATE 50 UG/ML
100 INJECTION, SOLUTION INTRAMUSCULAR; INTRAVENOUS
Status: ACTIVE | OUTPATIENT
Start: 2020-02-12

## 2020-02-12 NOTE — DISCHARGE INSTRUCTIONS
Home Care Instructions Pain Management:    1. DIET:   You may resume your normal diet today.   2. BATHING:   You may shower with luke warm water.  3. DRESSING:   You may remove your bandage today.   4. ACTIVITY LEVEL:   You may resume your normal activities 24 hrs after your procedure.  5. MEDICATIONS:   You may resume your normal medications today.   6. SPECIAL INSTRUCTIONS:   No heat to the injection site for 24 hrs including, bath or shower, heating pad, moist heat, or hot tubs.    Use ice pack to injection site for any pain or discomfort.  Apply ice packs for 20 minute intervals as needed.   If you have received any sedatives by mouth today you may not drive for 12 hours.    If you have received any sedation through your IV, you may not drive for 24 hrs.     PLEASE CALL YOUR DOCTOR IF:  1. Redness or swelling around the injection site.  2. Fever of 101 degrees  3. Drainage (pus) from the injection site.  4. For any continuous bleeding (some dried blood over the incision is normal.)    FOR EMERGENCIES:   If any unusual problems or difficulties occur during clinic hours, call [phone] or [phone].     Fall Prevention  Millions of people fall every year and injure themselves. You may have had anesthesia or sedation which may increase your risk of falling. You may have health issues that put you at an increased risk of falling.     Here are ways to reduce your risk of falling.  ·   · Make your home safe by keeping walkways clear of objects you may trip over.  · Use non-slip pads under rugs. Do not use area rugs or small throw rugs.  · Use non-slip mats in bathtubs and showers.  · Install handrails and lights on staircases.  · Do not walk in poorly lit areas.  · Do not stand on chairs or wobbly ladders.  · Use caution when reaching overhead or looking upward. This position can cause a loss of balance.  · Be sure your shoes fit properly, have non-slip bottoms and are in good condition.   · Wear shoes both inside and  out. Avoid going barefoot or wearing slippers.  · Be cautious when going up and down stairs, curbs, and when walking on uneven sidewalks.  · If your balance is poor, consider using a cane or walker.  · If your fall was related to alcohol use, stop or limit alcohol intake.   · If your fall was related to use of sleeping medicines, talk to your doctor about this. You may need to reduce your dosage at bedtime if you awaken during the night to go to the bathroom.    · To reduce the need for nighttime bathroom trips:  ¨ Avoid drinking fluids for several hours before going to bed  ¨ Empty your bladder before going to bed  ¨ Men can keep a urinal at the bedside  · Stay as active as you can. Balance, flexibility, strength, and endurance all come from exercise. They all play a role in preventing falls. Ask your healthcare provider which types of activity are right for you.  · Get your vision checked on a regular basis.  · If you have pets, know where they are before you stand up or walk so you don't trip over them.  · Use night lights.

## 2020-02-12 NOTE — OP NOTE
LUMBAR Medial Branch Radiofrequency Ablation Under Fluoroscopy  Time-out taken to identify patient and procedure side prior to starting the procedure.   I attest that I have reviewed the patient's home medications prior to the procedure and no contraindication have been identified. I  re-evaluated the patient after the patient was positioned for the procedure in the procedure room immediately before the procedural time-out. The vital signs are current and represent the current state of the patient which has not significantly changed since the preprocedure assessment.                   Date of Service: 02/12/2020    PCP: Marcelino Iglesias MD    Referring Physician:                                                PROCEDURE:  right L3, L4 and L5 medial branch radiofrequency ablation    REASON FOR PROCEDURE: Lumbar spondylosis [M47.816]    PHYSICIAN: Jorge Warner MD  ASSISTANTS: None    MEDICATIONS INJECTED:  0.5 ml of Kenalog 40mg/ml, and Bupivacaine 0.25% 10ml. Of that, 1.5-3ml injected per level before & after the ablation.    LOCAL ANESTHETIC USED: Xylocaine 2% 3ml each site.  SEDATION MEDICATIONS: Versed 1 mg and fentanyl 50 mcg IV.  Conscious sedation provided by MD and monitored by RN.   (See nurse documentation and case log for sedation time)      ESTIMATED BLOOD LOSS:  None.    COMPLICATIONS:  None.    TECHNIQUE:   Laying in a prone position, the patient was prepped and draped in the usual sterile fashion using ChloraPrep and fenestrated drape.  The level was determined under fluoroscopic guidance.  Local anesthetic was given by going down to the hub of the 27-gauge 1.25in needle and raising a wheel.  The 20-gauge needle was introduced to the anatomic local of the median branch at the lateral mass utilizing live fluoroscopy at each level stated above. Motor stimulation done to confirm no motor nerve ablation takes place up to 2 Volt 2Hz..  Sensory stimulation done to detect similarities in pain location 1.5  Volts 50Hz.. Medication was then injected slowly.  Ablation then done per level utilizing [place] radiofrequency generator 80°C for 90 seconds. The patient tolerated the procedure well.         The patient was monitored after the procedure.  Patient was given post procedure and discharge instructions to follow at home.  We will see the patient back in two weeks or the patient may call to inform of status. The patient was discharged in a stable condition

## 2020-02-12 NOTE — INTERVAL H&P NOTE
The patient has been examined and the H&P has been reviewed:    I concur with the findings and no changes have occurred since H&P was written.    Anesthesia/Surgery risks, benefits and alternative options discussed and understood by patient/family.    AAOx3 NAD  Mood and affect normal  Memory and language intact  RRR  CTAB        Active Hospital Problems    Diagnosis  POA    Lumbar spondylosis [M47.816]  Yes      Resolved Hospital Problems   No resolved problems to display.

## 2020-02-12 NOTE — PLAN OF CARE
Pt discharged with spouse . In no acute distress. Gait steady. Verbalizes understanding of post procedure restrictions and instructions.

## 2020-02-13 ENCOUNTER — TELEPHONE (OUTPATIENT)
Dept: PAIN MEDICINE | Facility: CLINIC | Age: 70
End: 2020-02-13

## 2020-02-13 DIAGNOSIS — M47.816 LUMBAR SPONDYLOSIS: Primary | ICD-10-CM

## 2020-02-13 NOTE — TELEPHONE ENCOUNTER
Mr. Henriquez reports feeling much better following the right L3-5 RFA performed yesterday.  He added that he can definitely tell the difference btwn the right and left sides.  Left L3-5 RFA will be scheduled on 3/4/2020.  Dr. Warner updated.

## 2020-02-18 ENCOUNTER — DOCUMENTATION ONLY (OUTPATIENT)
Dept: REHABILITATION | Facility: HOSPITAL | Age: 70
End: 2020-02-18
Attending: NURSE PRACTITIONER
Payer: MEDICARE

## 2020-02-18 NOTE — PROGRESS NOTES
Health  Wellness Visit Note    Name: Luciano Henriquez Jr.  Clinic Number: [id number]  Physician: Juan Arteaga NP  Diagnosis: No diagnosis found.  Past Medical History:   Diagnosis Date    Allergy     Hyperlipidemia     Hypertension     Reflux esophagitis      Visit Number: 39  Precautions: DM      1st PT visit:  7/6/19  Year of care end date:  7/6/20  6 Month test  Performed: Completed 2/4/2020   12 Month test performed: July 2020  Mind body plan:Plan B, 2/week  Patient level: Level A      Time In: 11:03 AM  Time Out: 12:24 PM  Total Treatment Time: 81 minutes    Wellness [employer] 2020  Handout on this week's wellness topic Cardiovascular Exercise and Flexibility provided  along with a discussion on what it means, the benefits, and suggestions for practice.  Reviewed last week's topic of Muscular Strength and patient has been making sure to stay more active when possible.    Subjective:    Patient reports 4 out of 10 pain lower back on average throughout each day. Stretching and Exercising has been being completed about every day of the week usually, but hasn't been being completed as much because of recent procedure. Icing has not been being completed, but he has been using bio freeze. Patient will continue to stretch and exercise more soon. He has been feeling better since procedure and it has helped with pain, but not completely. Movement helps reduce his discomfort level the most. Patient is overall feeling well.    Objective:   Luciano completed treadmill, therapeutic stretches (LTR, ham stretch, piriformis stretch, LAVELLE) and the following MedX exercise machines: core lumbar, torso rotation l/r, leg extension, leg curl, upright row, chest press, biceps curl, triceps extension, leg press, hip abd, and hip add    See exercise log in patient folder for rate of exertion and repetitions completed.     Fitness Machine Education Key:  E-education on equipment initiated and further follow up and education  needed  I-independent with  and exercise    Discussed but set up teaching not yet started    Lumbarl Ext. I Torso Rotation I Leg Press I   Leg Extension I Seated Leg Curl I Chest Press I   Seated Row I Hip ADD I Hip ABD I   Triceps Extension I Bicep Curl I Other: X       Assessment:   Patient tolerated visit very well and wants to continue wellness to maintain strength. Patient warmed up on treadmill, stretched, and iced low back for 10 minutes after workout.     Plan:  Continue with established plan of care towards wellness goals. Patient is indep with .    Patient's [facility] end date will be extended 1 week to make up for the visit missed when the HC was absent.   (7/7/2020 will be his last wellness visit)    For health  : Rubens Gallo  2/18/2020

## 2020-02-20 ENCOUNTER — DOCUMENTATION ONLY (OUTPATIENT)
Dept: REHABILITATION | Facility: HOSPITAL | Age: 70
End: 2020-02-20
Attending: NURSE PRACTITIONER
Payer: MEDICARE

## 2020-02-20 NOTE — PROGRESS NOTES
Health  Wellness Visit Note    Name: Luciano Henriquez Jr.  Clinic Number: [id number]  Physician: Juan Arteaga NP  Diagnosis: No diagnosis found.  Past Medical History:   Diagnosis Date    Allergy     Hyperlipidemia     Hypertension     Reflux esophagitis      Visit Number: 40  Precautions: DM      1st PT visit:  7/6/19  Year of care end date:  7/6/20  6 Month test  Performed: Completed 2/4/2020   12 Month test performed: July 2020  Mind body plan:Plan B, 2/week  Patient level: Level A      Time In: 11:05 AM   Time Out: 12:15 PM  Total Treatment Time: 70 minutes     [employer] 2020  Handout on this week's wellness topic Cardiovascular Exercise and Flexibility provided  along with a discussion on what it means, the benefits, and suggestions for practice.  Reviewed last week's topic of Muscular Strength and patient has been making sure to stay more active when possible.    Subjective:    Patient reports that his low back has a dull ache today and is reporting at 3/10 on the pain scale. Patient recently had an ablation procedure on his low back that he says has helped relieve some of the pain that he was experiencing. Patient will be going in for the second half of this procedure at the beginning of March. Patient no longer has the sharp pain that he would have when he woke up in the morning. Patient's back discomfort progressively gets better throughout the day as he moves and is more active. Patient is working to get back towards his routine of daily stretching, icing, and exercising. Patient has not done much due to the procedure he had last week. Patient was able to tolerate his last wellness visit without any problems. Patient overall is feeling well and is happy with his reduction in back pain.     Objective:   Luciano completed treadmill, therapeutic stretches (LTR, ham stretch, piriformis stretch, LAVELLE) and the following MedX exercise machines: core lumbar, torso rotation l/r, leg extension, leg  curl, upright row, chest press, biceps curl, triceps extension, leg press, hip abd, and hip add    See exercise log in patient folder for rate of exertion and repetitions completed.     Fitness Machine Education Key:  E-education on equipment initiated and further follow up and education needed  I-independent with  and exercise    Discussed but set up teaching not yet started    Lumbarl Ext. I Torso Rotation I Leg Press I   Leg Extension I Seated Leg Curl I Chest Press I   Seated Row I Hip ADD I Hip ABD I   Triceps Extension I Bicep Curl I Other: X       Assessment:   Patient tolerated visit very well and wants to continue wellness to maintain strength. Patient warmed up on treadmill, stretched, and iced low back for 10 minutes after workout.     Plan:  Continue with established plan of care towards wellness goals. Patient is indep with .    Patient's [facility] end date will be extended 1 week to make up for the visit missed when the HC was absent.   (7/7/2020 will be his last wellness visit)    For health  : Madhavi Santoyo  2/20/2020

## 2020-02-27 ENCOUNTER — TELEPHONE (OUTPATIENT)
Dept: PAIN MEDICINE | Facility: CLINIC | Age: 70
End: 2020-02-27

## 2020-02-27 NOTE — TELEPHONE ENCOUNTER
Reviewed pre-procedure instructions with patient's wife, as well as provided arrival time of 0900- information also sent via [employer].  All questions answered- patient verbalized understanding.

## 2020-03-04 ENCOUNTER — HOSPITAL ENCOUNTER (OUTPATIENT)
Facility: HOSPITAL | Age: 70
Discharge: HOME | End: 2020-03-04
Attending: PAIN MEDICINE | Admitting: PAIN MEDICINE
Payer: MEDICARE

## 2020-03-04 VITALS
OXYGEN SATURATION: 99 % | SYSTOLIC BLOOD PRESSURE: 136 MMHG | DIASTOLIC BLOOD PRESSURE: 74 MMHG | HEART RATE: 72 BPM | HEIGHT: 66 IN | BODY MASS INDEX: 32.62 KG/M2 | WEIGHT: 203 LBS | TEMPERATURE: 98 F | RESPIRATION RATE: 18 BRPM

## 2020-03-04 DIAGNOSIS — M47.816 LUMBAR SPONDYLOSIS: Primary | ICD-10-CM

## 2020-03-04 PROCEDURE — 99152 MOD SED SAME PHYS/QHP 5/>YRS: CPT | Performed by: PAIN MEDICINE

## 2020-03-04 PROCEDURE — 64636 PR DESTROY L/S FACET JNT ADDL: ICD-10-PCS | Mod: LT,,, | Performed by: PAIN MEDICINE

## 2020-03-04 PROCEDURE — 64636 DESTROY L/S FACET JNT ADDL: CPT | Performed by: PAIN MEDICINE

## 2020-03-04 PROCEDURE — 64635 DESTROY LUMB/SAC FACET JNT: CPT | Mod: LT,,, | Performed by: PAIN MEDICINE

## 2020-03-04 PROCEDURE — 64635 DESTROY LUMB/SAC FACET JNT: CPT | Performed by: PAIN MEDICINE

## 2020-03-04 PROCEDURE — 64636 DESTROY L/S FACET JNT ADDL: CPT | Mod: LT,,, | Performed by: PAIN MEDICINE

## 2020-03-04 PROCEDURE — 64635 PR DESTROY LUMB/SAC FACET JNT: ICD-10-PCS | Mod: LT,,, | Performed by: PAIN MEDICINE

## 2020-03-04 PROCEDURE — 25000003 PHARM REV CODE 250: Performed by: PAIN MEDICINE

## 2020-03-04 PROCEDURE — 99152 PR MOD CONSCIOUS SEDATION, SAME PHYS, 5+ YRS, FIRST 15 MIN: ICD-10-PCS | Mod: ,,, | Performed by: PAIN MEDICINE

## 2020-03-04 PROCEDURE — 63600175 PHARM REV CODE 636 W HCPCS: Performed by: PAIN MEDICINE

## 2020-03-04 PROCEDURE — 99152 MOD SED SAME PHYS/QHP 5/>YRS: CPT | Mod: ,,, | Performed by: PAIN MEDICINE

## 2020-03-04 RX ORDER — BUPIVACAINE HYDROCHLORIDE 2.5 MG/ML
10 INJECTION, SOLUTION EPIDURAL; INFILTRATION; INTRACAUDAL ONCE
Status: COMPLETED | OUTPATIENT
Start: 2020-03-04 | End: 2020-03-04

## 2020-03-04 RX ORDER — LIDOCAINE HYDROCHLORIDE 20 MG/ML
INJECTION, SOLUTION INFILTRATION; PERINEURAL
Status: DISCONTINUED
Start: 2020-03-04 | End: 2020-03-04 | Stop reason: HOSPADM

## 2020-03-04 RX ORDER — MIDAZOLAM HYDROCHLORIDE 1 MG/ML
5 INJECTION INTRAMUSCULAR; INTRAVENOUS
Status: DISCONTINUED | OUTPATIENT
Start: 2020-03-04 | End: 2020-03-04 | Stop reason: HOSPADM

## 2020-03-04 RX ORDER — MIDAZOLAM HYDROCHLORIDE 1 MG/ML
INJECTION INTRAMUSCULAR; INTRAVENOUS
Status: DISCONTINUED
Start: 2020-03-04 | End: 2020-03-04 | Stop reason: HOSPADM

## 2020-03-04 RX ORDER — LIDOCAINE HYDROCHLORIDE 20 MG/ML
10 INJECTION, SOLUTION INFILTRATION; PERINEURAL ONCE
Status: COMPLETED | OUTPATIENT
Start: 2020-03-04 | End: 2020-03-04

## 2020-03-04 RX ORDER — SODIUM CHLORIDE 9 MG/ML
INJECTION, SOLUTION INTRAVENOUS CONTINUOUS
Status: DISCONTINUED | OUTPATIENT
Start: 2020-03-04 | End: 2020-03-04 | Stop reason: HOSPADM

## 2020-03-04 RX ORDER — TRIAMCINOLONE ACETONIDE 40 MG/ML
40 INJECTION, SUSPENSION INTRA-ARTICULAR; INTRAMUSCULAR ONCE
Status: COMPLETED | OUTPATIENT
Start: 2020-03-04 | End: 2020-03-04

## 2020-03-04 RX ORDER — FENTANYL CITRATE 50 UG/ML
INJECTION, SOLUTION INTRAMUSCULAR; INTRAVENOUS
Status: DISCONTINUED
Start: 2020-03-04 | End: 2020-03-04 | Stop reason: HOSPADM

## 2020-03-04 RX ORDER — TRIAMCINOLONE ACETONIDE 40 MG/ML
INJECTION, SUSPENSION INTRA-ARTICULAR; INTRAMUSCULAR
Status: DISCONTINUED
Start: 2020-03-04 | End: 2020-03-04 | Stop reason: HOSPADM

## 2020-03-04 RX ORDER — FENTANYL CITRATE 50 UG/ML
100 INJECTION, SOLUTION INTRAMUSCULAR; INTRAVENOUS
Status: DISCONTINUED | OUTPATIENT
Start: 2020-03-04 | End: 2020-03-04 | Stop reason: HOSPADM

## 2020-03-04 RX ORDER — BUPIVACAINE HYDROCHLORIDE 2.5 MG/ML
INJECTION, SOLUTION EPIDURAL; INFILTRATION; INTRACAUDAL
Status: DISCONTINUED
Start: 2020-03-04 | End: 2020-03-04 | Stop reason: HOSPADM

## 2020-03-04 NOTE — DISCHARGE INSTRUCTIONS

## 2020-03-04 NOTE — OP NOTE
LUMBAR Medial Branch Radiofrequency Ablation Under Fluoroscopy  Time-out taken to identify patient and procedure side prior to starting the procedure.   I attest that I have reviewed the patient's home medications prior to the procedure and no contraindication have been identified. I  re-evaluated the patient after the patient was positioned for the procedure in the procedure room immediately before the procedural time-out. The vital signs are current and represent the current state of the patient which has not significantly changed since the preprocedure assessment.                   Date of Service: 03/04/2020    PCP: Marcelino Iglesias MD    Referring Physician:                                                PROCEDURE:  left L3, L4 and L5 medial branch radiofrequency ablation    REASON FOR PROCEDURE: Lumbar spondylosis [M47.816]    PHYSICIAN: Jorge Warner MD  ASSISTANTS: None    MEDICATIONS INJECTED:  0.5 ml of Kenalog 40mg/ml, and Bupivacaine 0.25% 10ml. Of that, 1.5-3ml injected per level before & after the ablation.    LOCAL ANESTHETIC USED: Xylocaine 2% 3ml each site.  SEDATION MEDICATIONS: Versed 1 mg and fentanyl 75 mcg IV.  Conscious sedation provided by MD and monitored by RN.   (See nurse documentation and case log for sedation time)      ESTIMATED BLOOD LOSS:  None.    COMPLICATIONS:  None.    TECHNIQUE:   Laying in a prone position, the patient was prepped and draped in the usual sterile fashion using ChloraPrep and fenestrated drape.  The level was determined under fluoroscopic guidance.  Local anesthetic was given by going down to the hub of the 27-gauge 1.25in needle and raising a wheel.  The 20-gauge needle was introduced to the anatomic local of the median branch at the lateral mass utilizing live fluoroscopy at each level stated above. Motor stimulation done to confirm no motor nerve ablation takes place up to 2 Volt 2Hz..  Sensory stimulation done to detect similarities in pain location 1.5  Volts 50Hz.. Medication was then injected slowly.  Ablation then done per level utilizing Halyard radiofrequency generator 80°C for 90 seconds. The patient tolerated the procedure well.         The patient was monitored after the procedure.  Patient was given post procedure and discharge instructions to follow at home.  We will see the patient back in two weeks or the patient may call to inform of status. The patient was discharged in a stable condition

## 2020-03-04 NOTE — H&P
Subjective:     Patient ID: Luciano Henriquez Jr. is a 69 y.o. male    Chief Complaint: Low back pain    Referred by: Jorge Warner Jr*      HPI:    Luciano Henriquez Jr. is a 69 y.o. male who presents today for left L3, L4 and L5 RFA. He denies any changes in the quality or location of the pain.        Review of Systems   Constitutional: Negative for activity change, appetite change, chills, fatigue, fever and unexpected weight change.   HENT: Negative for hearing loss.    Eyes: Negative for visual disturbance.   Respiratory: Negative for chest tightness and shortness of breath.    Cardiovascular: Negative for chest pain.   Gastrointestinal: Negative for abdominal pain, constipation, diarrhea, nausea and vomiting.   Genitourinary: Negative for difficulty urinating.   Musculoskeletal: Positive for back pain, gait problem and myalgias. Negative for neck pain.   Skin: Negative for rash.   Neurological: Negative for dizziness, weakness, light-headedness, numbness and headaches.   Psychiatric/Behavioral: Positive for sleep disturbance. Negative for hallucinations and suicidal ideas. The patient is not nervous/anxious.        Past Medical History:   Diagnosis Date    Allergy     Hyperlipidemia     Hypertension     Reflux esophagitis        Past Surgical History:   Procedure Laterality Date    arthroscopic on left knee      CARPAL TUNNEL RELEASE      right hand only    deviated septum repair      EPIDURAL STEROID INJECTION N/A 10/23/2019    Procedure: Injection, Steroid, Epidural Caudal;  Surgeon: Jorge Warner Jr., MD;  Location: [facility] ENDO;  Service: Pain Management;  Laterality: N/A;  Caudal BETO    [address]    Arrive @ 0800 (morning request); IV Sedation- Fasting; ASA last 10/15; No DM    EPIDURAL STEROID INJECTION N/A 11/27/2019    Procedure: Injection, Steroid, Epidural Caudal;  Surgeon: Jorge Warner Jr., MD;  Location: [facility] ENDO;  Service: Pain Management;  Laterality: N/A;  Caudal  BETO    04025    Arrive @ 0830; IV Sedation- Fasting (F-50, V-1); ASA last 11/19; No DM    EPIDURAL STEROID INJECTION Bilateral 1/29/2020    Procedure: Lumbar Medial Branch Block;  Surgeon: Jorge Warner Jr., MD;  Location: [facility] ENDO;  Service: Pain Management;  Laterality: Bilateral;  Bilateral L3-5 MBB    Arrive @ 0900; NO Sedation; ASA not held    EPIDURAL STEROID INJECTION Right 2/12/2020    Procedure: Lumbar Radiofrequency Thermocoagulation of Medial Branches;  Surgeon: Jorge Warner Jr., MD;  Location: [facility] ENDO;  Service: Pain Management;  Laterality: Right;  Right L3-5 RFA    Arrive @ 0700; IV sedation; ASA not held    HERNIA REPAIR Left     inguinal    VASECTOMY         Social History     Socioeconomic History    Marital status:      Spouse name: Not on file    Number of children: Not on file    Years of education: Not on file    Highest education level: Not on file   Occupational History    Not on file   Social Needs    Financial resource strain: Not on file    Food insecurity:     Worry: Not on file     Inability: Not on file    Transportation needs:     Medical: Not on file     Non-medical: Not on file   Tobacco Use    Smoking status: Never Smoker    Smokeless tobacco: Never Used    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     Substance and Sexual Activity    Alcohol use: Yes     Alcohol/week: 14.0 standard drinks     Types: 14 Glasses of wine per week     Comment: Drinks 2 glasses of wine per night    Drug use: No    Sexual activity: Yes     Partners: Female   Lifestyle    Physical activity:     Days per week: Not on file     Minutes per session: Not on file    Stress: Not on file   Relationships    Social connections:     Talks on phone: Not on file     Gets together: Not on file     Attends Bahai service: Not on file     Active member of club or organization: Not on file     Attends meetings of clubs or organizations: Not on file      Relationship status: Not on file   Other Topics Concern    Not on file   Social History Narrative    Not on file       Review of patient's allergies indicates:  No Known Allergies    Current Facility-Administered Medications on File Prior to Encounter   Medication Dose Route Frequency Provider Last Rate Last Dose    0.9%  NaCl infusion   Intravenous Continuous Jorge Warner Jr., MD        fentaNYL injection 100 mcg  100 mcg Intravenous PRN Jorge Warner Jr., MD   50 mcg at 02/12/20 0822    midazolam (VERSED) 1 mg/mL injection 5 mg  5 mg Intravenous PRN Jorge Warner Jr., MD   1 mg at 02/12/20 0823     Current Outpatient Medications on File Prior to Encounter   Medication Sig Dispense Refill    albuterol (VENTOLIN HFA) 90 mcg/actuation inhaler Inhale 2 puffs into the lungs every 6 (six) hours as needed for Wheezing or Shortness of Breath. Rescue 18 g 0    amitriptyline (ELAVIL) 100 MG tablet       amLODIPine (NORVASC) 10 MG tablet Take 1 tablet (10 mg total) by mouth once daily. 90 tablet 3    ascorbic acid (VITAMIN C) 500 MG tablet Take 500 mg by mouth 2 (two) times daily.      aspirin (ECOTRIN) 81 MG EC tablet Take 81 mg by mouth once daily.      azelastine (ASTELIN) 137 mcg (0.1 %) nasal spray USE 1 SPRAY(S) IN EACH NOSTRIL TWICE DAILY 30 mL 0    BOOSTRIX TDAP 2.5-8-5 Lf-mcg-Lf/0.5mL Syrg injection       chlorthalidone (HYGROTEN) 25 MG Tab Take 1 tablet (25 mg total) by mouth once daily. 90 tablet 3    doxepin (ZONALON) 5 % cream       fish oil-omega-3 fatty acids 300-1,000 mg capsule Take 2 g by mouth once daily.      multivitamin capsule Take 1 capsule by mouth once daily.      olmesartan (BENICAR) 40 MG tablet Take 1 tablet (40 mg total) by mouth once daily. 90 tablet 3    rosuvastatin (CRESTOR) 10 MG tablet Take 1 tablet (10 mg total) by mouth every evening. 90 tablet 3    sucralfate (CARAFATE) 1 gram tablet Take 1 tablet (1 g total) by mouth 4 (four) times daily before  "meals and nightly. 360 tablet 2    tiZANidine (ZANAFLEX) 4 MG tablet Take 1 tablet (4 mg total) by mouth every 8 (eight) hours as needed. 90 tablet 5       Objective:      BP (!) 146/78 (Patient Position: Lying)   Pulse 79   Temp 98.3 °F (36.8 °C) (Oral)   Resp 18   Ht 5' 5.5" (1.664 m)   Wt 92.1 kg (203 lb)   SpO2 (!) 94%   BMI 33.27 kg/m²     Exam:  AAOx3 NAD  Mood and affect normal  Memory and language intact  RRR  CTAB        Assessment:       Lumbar spondylosis      Plan:         Luciano Wayroe Martinez Saxena is a 69 y.o. male with lumbar facet pain .    Proceed with RFA as planned.     "

## 2020-03-10 ENCOUNTER — DOCUMENTATION ONLY (OUTPATIENT)
Dept: REHABILITATION | Facility: HOSPITAL | Age: 70
End: 2020-03-10

## 2020-03-10 ENCOUNTER — TELEPHONE (OUTPATIENT)
Dept: REHABILITATION | Facility: HOSPITAL | Age: 70
End: 2020-03-10

## 2020-03-10 NOTE — PROGRESS NOTES
HC talked to patient over phone to check up on how they are doing and to see status of wellness after procedure. Patient reported feeling better, but might not be able to come into wellness until after this week.

## 2020-03-12 NOTE — PROGRESS NOTES
3/12/20 chart review: last outreach 11/8/2019  Current 30-day BP avg of 130/80 is at goal of <130/80. No medication changes suggested at this time. Pushing outreach out 8 weeks.     Last 5 Patient Entered Readings                                      Current 30 Day Average: 130/80     Recent Readings 3/5/2020 3/2/2020 3/2/2020 3/2/2020 2/19/2020    SBP (mmHg) 130 131 131 131 129    DBP (mmHg) 76 83 83 83 78    Pulse 70 66 66 66 60        Hypertension Medications             amLODIPine (NORVASC) 10 MG tablet Take 1 tablet (10 mg total) by mouth once daily.    chlorthalidone (HYGROTEN) 25 MG Tab Take 1 tablet (25 mg total) by mouth once daily.    olmesartan (BENICAR) 40 MG tablet Take 1 tablet (40 mg total) by mouth once daily.

## 2020-03-17 ENCOUNTER — PATIENT MESSAGE (OUTPATIENT)
Dept: REHABILITATION | Facility: HOSPITAL | Age: 70
End: 2020-03-17

## 2020-03-18 ENCOUNTER — DOCUMENTATION ONLY (OUTPATIENT)
Dept: REHABILITATION | Facility: HOSPITAL | Age: 70
End: 2020-03-18

## 2020-03-18 ENCOUNTER — TELEPHONE (OUTPATIENT)
Dept: REHABILITATION | Facility: HOSPITAL | Age: 70
End: 2020-03-18

## 2020-03-18 NOTE — TELEPHONE ENCOUNTER
Patient was called and informed that [facility] wellness services are being cancelled at this time  due to the covid 19 situation in the abundance of caution.   Patient  was agreeable to wait to  resume wellness once we resume operations.

## 2020-03-18 NOTE — PROGRESS NOTES
HC spoke with patient regarding the closure of the wellness program. Patient was informed that all remaining appointments for March have been canceled and that his [employer] account has been suspended. Patient would like an at home exercise program emailed to him. HC will follow up with patient in the upcoming weeks to see how he is doing.     Madhavi Santoyo   3/18/2020

## 2020-03-20 ENCOUNTER — DOCUMENTATION ONLY (OUTPATIENT)
Dept: REHABILITATION | Facility: HOSPITAL | Age: 70
End: 2020-03-20

## 2020-03-20 NOTE — PROGRESS NOTES
My Home Exercise Program      Lumbar Extension      Torso      Chest Press      Seated Row      Triceps Extension      Bicep Curls      Leg Press      Leg Extension      Leg Curls      Hip Abduction      Hip Adduction        Patient was sent HEP via email due to the suspension of the wellness program. Patient was instructed to complete program 3x per week and continue stretching and at home cardiovascular exercise.     Madhavi Santoyo   3/20/20

## 2020-03-26 ENCOUNTER — TELEPHONE (OUTPATIENT)
Dept: PAIN MEDICINE | Facility: CLINIC | Age: 70
End: 2020-03-26

## 2020-03-26 ENCOUNTER — TELEPHONE (OUTPATIENT)
Dept: REHABILITATION | Facility: HOSPITAL | Age: 70
End: 2020-03-26

## 2020-03-26 RX ORDER — OLMESARTAN MEDOXOMIL 40 MG/1
40 TABLET ORAL DAILY
Qty: 90 TABLET | Refills: 3 | Status: SHIPPED | OUTPATIENT
Start: 2020-03-26 | End: 2021-03-26

## 2020-03-26 NOTE — TELEPHONE ENCOUNTER
Called pt and informed him that 3/30/2020 visit with Dr. Warner needs to be rescheduled . Explained that our department isn't doing any in person or procedure visits at this time . Also that if pain gets to the extreme to contact us instead of visiting ED or hospitals . Offered virtual visit , same date and time . Pt accepted and was able to complete e-pre check. Agreed to be waiting with [facility] benjamín open 5-10 minutes prior to visit time . Verbalized understanding

## 2020-03-26 NOTE — TELEPHONE ENCOUNTER
Last Office Visit Info:   The patient's last visit with Marcelino Iglesias MD was on 7/31/2019.    The patient's last visit in current department was on Visit date not found.        Last CBC Results:   Lab Results   Component Value Date    WBC 4.60 03/21/2019    HGB 14.3 03/21/2019    HCT 43.3 03/21/2019     03/21/2019       Last CMP Results  Lab Results   Component Value Date     03/21/2019    K 3.6 03/21/2019     03/21/2019    CO2 30 (H) 03/21/2019    BUN 23 03/21/2019    CREATININE 1.3 03/21/2019    CALCIUM 9.9 03/21/2019    ALBUMIN 3.9 03/21/2019    AST 27 03/21/2019    ALT 34 03/21/2019       Last Lipids  Lab Results   Component Value Date    CHOL 157 03/21/2019    TRIG 74 03/21/2019    HDL 51 03/21/2019    LDLCALC 91.2 03/21/2019       Last A1C  Lab Results   Component Value Date    HGBA1C 6.5 (H) 03/21/2019       Last TSH  No results found for: TSH      Current Med Refills  Medication List with Changes/Refills   Current Medications    ALBUTEROL (VENTOLIN HFA) 90 MCG/ACTUATION INHALER    Inhale 2 puffs into the lungs every 6 (six) hours as needed for Wheezing or Shortness of Breath. Rescue       Start Date: 1/2/2019  End Date: --    AMITRIPTYLINE (ELAVIL) 100 MG TABLET           Start Date: 7/2/2018  End Date: --    AMLODIPINE (NORVASC) 10 MG TABLET    Take 1 tablet (10 mg total) by mouth once daily.       Start Date: 6/26/2019 End Date: --    ASCORBIC ACID (VITAMIN C) 500 MG TABLET    Take 500 mg by mouth 2 (two) times daily.       Start Date: --        End Date: --    ASPIRIN (ECOTRIN) 81 MG EC TABLET    Take 81 mg by mouth once daily.       Start Date: --        End Date: --    AZELASTINE (ASTELIN) 137 MCG (0.1 %) NASAL SPRAY    USE 1 SPRAY(S) IN EACH NOSTRIL TWICE DAILY       Start Date: 2/10/2020 End Date: --    BOOSTRIX TDAP 2.5-8-5 LF-MCG-LF/0.5ML SYRG INJECTION           Start Date: 10/23/2018End Date: --    CHLORTHALIDONE (HYGROTEN) 25 MG TAB    Take 1 tablet (25 mg total) by mouth  once daily.       Start Date: 1/13/2020 End Date: 1/12/2021    DOXEPIN (ZONALON) 5 % CREAM           Start Date: 7/2/2018  End Date: --    FISH OIL-OMEGA-3 FATTY ACIDS 300-1,000 MG CAPSULE    Take 2 g by mouth once daily.       Start Date: --        End Date: --    MULTIVITAMIN CAPSULE    Take 1 capsule by mouth once daily.       Start Date: --        End Date: --    OLMESARTAN (BENICAR) 40 MG TABLET    Take 1 tablet (40 mg total) by mouth once daily.       Start Date: 3/21/2019 End Date: 3/20/2020    ROSUVASTATIN (CRESTOR) 10 MG TABLET    Take 1 tablet (10 mg total) by mouth every evening.       Start Date: 3/21/2019 End Date: --    SUCRALFATE (CARAFATE) 1 GRAM TABLET    Take 1 tablet (1 g total) by mouth 4 (four) times daily before meals and nightly.       Start Date: 3/28/2019 End Date: --    TIZANIDINE (ZANAFLEX) 4 MG TABLET    Take 1 tablet (4 mg total) by mouth every 8 (eight) hours as needed.       Start Date: 11/11/2019End Date: 5/9/2020       Order(s) placed per written order guidelines:    Please advise.

## 2020-03-26 NOTE — TELEPHONE ENCOUNTER
----- Message from Martha Castillo sent at 3/26/2020  1:42 PM CDT -----  Contact: pt  Type: RX Refill Request    Who Called: pt    Have you contacted your pharmacy:    Refill or New Rx:olmesartan (BENICAR) 40 MG tablet     RX Name and Strength:    How is the patient currently taking it? (ex. 1XDay):    Is this a 30 day or 90 day RX:    Preferred Pharmacy with phone number:    [facility] [address] [facility]  [address] [facility]  [address] [place] [address]  Phone: [phone] Fax: [phone]        Local or Mail Order:    Ordering Provider:    Would the patient rather a call back or a response via [facility]? call    Best Call Back Number:[phone]    Additional Information:

## 2020-03-30 ENCOUNTER — TELEPHONE (OUTPATIENT)
Dept: PAIN MEDICINE | Facility: CLINIC | Age: 70
End: 2020-03-30

## 2020-03-30 NOTE — TELEPHONE ENCOUNTER
Called pt and explained this morning's virtual visit had to be rescheduled to Thursday due to Dr. Soto's schedule change. Apologized for the incovenice and scheduled around the time he requested . Agreed he'll check the benjamín if a new pre-check process is need it and will complete

## 2020-03-31 ENCOUNTER — PATIENT OUTREACH (OUTPATIENT)
Dept: OTHER | Facility: OTHER | Age: 70
End: 2020-03-31

## 2020-03-31 NOTE — PROGRESS NOTES
Digital Medicine: Health  Follow-Up      Follow Up  Follow-up reason(s): reading review and goal follow-up    Pt is in the process of moving to [place]. He reports increased stress due to moving during COVID-19 and attributes this to BP spikes. He is trying to stay active packing and is working on limiting sodium intake.     INTERVENTION(S)  encouragement/support    PLAN  continue monitoring    Discussed patient's concerns about the COVID-19 outbreak, and encouraged prevention strategies. Reminded patient that digital medicine team is available for questions or concerns.     There are no preventive care reminders to display for this patient.    Last 5 Patient Entered Readings                                      Current 30 Day Average: 132/80     Recent Readings 3/23/2020 3/23/2020 3/23/2020 3/14/2020 3/5/2020    SBP (mmHg) 139 140 141 127 130    DBP (mmHg) 77 77 85 74 76    Pulse 66 68 70 59 70        SDOH: Deferred.

## 2020-04-02 ENCOUNTER — PATIENT MESSAGE (OUTPATIENT)
Dept: ADMINISTRATIVE | Facility: OTHER | Age: 70
End: 2020-04-02

## 2020-04-02 ENCOUNTER — TELEPHONE (OUTPATIENT)
Dept: PAIN MEDICINE | Facility: CLINIC | Age: 70
End: 2020-04-02

## 2020-04-02 ENCOUNTER — TELEPHONE (OUTPATIENT)
Dept: REHABILITATION | Facility: HOSPITAL | Age: 70
End: 2020-04-02

## 2020-04-02 ENCOUNTER — DOCUMENTATION ONLY (OUTPATIENT)
Dept: REHABILITATION | Facility: OTHER | Age: 70
End: 2020-04-02

## 2020-04-02 DIAGNOSIS — M47.816 LUMBAR SPONDYLOSIS: ICD-10-CM

## 2020-04-02 DIAGNOSIS — M51.36 DDD (DEGENERATIVE DISC DISEASE), LUMBAR: ICD-10-CM

## 2020-04-02 DIAGNOSIS — M48.062 SPINAL STENOSIS OF LUMBAR REGION WITH NEUROGENIC CLAUDICATION: Primary | ICD-10-CM

## 2020-04-02 NOTE — TELEPHONE ENCOUNTER
----- Message from Jorge Warner Jr., MD sent at 4/2/2020  8:08 AM CDT -----  Please call patient to see if he had technical difficulties regarding today's visit.

## 2020-04-02 NOTE — TELEPHONE ENCOUNTER
Telephone counter initiated to discuss efficacy of recent lumbar radiofrequency ablations.  Patient reports 30-40% relief following these procedures.  He definitely notices an improvement in his activities but still says he has pain pre much all the time.  He denies any changes in the quality or location of his pain.  He denies any new or worsening symptoms.  He continues to take Tylenol and Advil as needed for pain.  He also takes tizanidine occasionally but is unsure if this medication helps.    Patient inquired about what options would be available moving forward.  In the past, we had discussed potentially performing a spinal cord stimulator trial.  I think this may be the best option for the management of his pain given less than optimal response to bilateral lumbar radiofrequency ablation.  Patient does have multilevel spinal stenosis which I believe is the main contributor to his pain.    Patient also reports that he plans to move to [place] very soon.  He will seek out the care of a pain management physician in the area where he plans to reside.  I encouraged patient to contact us when he questions or concerns that he may have.  Patient expressed understanding agreement.

## 2020-04-02 NOTE — PROGRESS NOTES
Mr. Henriquez informed me today via telephone that he will be moving to [place] within the next two weeks. He is moving there to be closer to his kids and grandchildren. Mr. Henriquez was an active participant in the healthy back program and was attending wellness twice per week at the [facility] location.     's pain management doctor recommended that he goes to the [facility] for treatment on his back.     Mr. Henriquez will not be returning to the wellness program.  will be following up with Mr. Henriquez for the remainder of the month to give him any tools he needs regarding the management of his lower back pain.      Health : Madhavi Santoyo  4/2/2020

## 2020-04-08 ENCOUNTER — DOCUMENTATION ONLY (OUTPATIENT)
Dept: REHABILITATION | Facility: OTHER | Age: 70
End: 2020-04-08

## 2020-04-08 NOTE — PROGRESS NOTES
Patient was sent April wellness flyers via email and was notified to contact HC via email if any questions or concerns arise.     HC: Madhavi Santoyo   4/8/2020

## 2020-05-13 ENCOUNTER — PATIENT OUTREACH (OUTPATIENT)
Dept: OTHER | Facility: OTHER | Age: 70
End: 2020-05-13

## 2020-05-21 DIAGNOSIS — I10 BENIGN ESSENTIAL HTN: ICD-10-CM

## 2020-05-21 DIAGNOSIS — E78.2 MIXED HYPERLIPIDEMIA: ICD-10-CM

## 2020-05-21 RX ORDER — ROSUVASTATIN CALCIUM 10 MG/1
10 TABLET, COATED ORAL NIGHTLY
Qty: 90 TABLET | Refills: 3 | Status: SHIPPED | OUTPATIENT
Start: 2020-05-21

## 2020-05-21 RX ORDER — AMLODIPINE BESYLATE 10 MG/1
10 TABLET ORAL DAILY
Qty: 90 TABLET | Refills: 3 | Status: SHIPPED | OUTPATIENT
Start: 2020-05-21

## 2020-05-21 NOTE — TELEPHONE ENCOUNTER
----- Message from Sandor Digna sent at 5/21/2020 12:13 PM CDT -----  Contact: self  Type: RX Refill Request    Who Called:  self    Have you contacted your pharmacy: no    Refill or New Rx: refill     RX Name and Strength: amLODIPine (NORVASC) 10 MG tablet                                                rosuvastatin (CRESTOR) 10 MG tablet    Is this a 30 day or 90 day RX: 90 day    Preferred Pharmacy with phone number: [facility] - [address] [phone] (Phone)  [phone] (Fax)    Local or Mail Order: local     Ordering Provider: Page  Would the patient rather a call back or a response via [facility]? Call     Best Call Back Number: [phone]

## 2020-05-25 ENCOUNTER — TELEPHONE (OUTPATIENT)
Dept: REHABILITATION | Facility: HOSPITAL | Age: 70
End: 2020-05-25

## 2020-06-16 NOTE — PROGRESS NOTES
Digital Medicine: Health  Follow-Up    The history is provided by a significant other.   Follow Up  Follow-up reason(s): reading review and goal follow-up    Pt recently moved to [place], but has not been able to establish care with a PCP due to COVID-19. He is still in contact with Dr. Iglesias and is still receiving refills of her medications prescribed by Dr. Leigh. He plans to establish care with a provider in [place] as soon as he is able to.    INTERVENTION(S)  encouragement/support    PLAN  continue monitoring    There are no preventive care reminders to display for this patient.    Last 5 Patient Entered Readings                                      Current 30 Day Average: 136/80     Recent Readings 6/16/2020 6/16/2020 6/7/2020 5/26/2020 5/15/2020    SBP (mmHg) 133 148 136 139 146    DBP (mmHg) 76 82 82 78 87    Pulse 67 61 61 65 60            Diet Screening   No change to diet.      Physical Activity Screening   No change to exercise routine.

## 2020-06-29 PROCEDURE — 99454 REM MNTR PHYSIOL PARAM 16-30: CPT | Mod: PBBFAC,PN | Performed by: FAMILY MEDICINE

## 2020-07-14 RX ORDER — SUCRALFATE 1 G/1
1 TABLET ORAL
Qty: 360 TABLET | Refills: 2 | Status: SHIPPED | OUTPATIENT
Start: 2020-07-14 | End: 2020-07-16 | Stop reason: SDUPTHER

## 2020-07-14 NOTE — TELEPHONE ENCOUNTER
Last Office Visit Info:   The patient's last visit with Marcelino Iglesias MD was on 7/31/2019.    The patient's last visit in current department was on Visit date not found.        Last CBC Results:   Lab Results   Component Value Date    WBC 4.60 03/21/2019    HGB 14.3 03/21/2019    HCT 43.3 03/21/2019     03/21/2019       Last CMP Results  Lab Results   Component Value Date     03/21/2019    K 3.6 03/21/2019     03/21/2019    CO2 30 (H) 03/21/2019    BUN 23 03/21/2019    CREATININE 1.3 03/21/2019    CALCIUM 9.9 03/21/2019    ALBUMIN 3.9 03/21/2019    AST 27 03/21/2019    ALT 34 03/21/2019       Last Lipids  Lab Results   Component Value Date    CHOL 157 03/21/2019    TRIG 74 03/21/2019    HDL 51 03/21/2019    LDLCALC 91.2 03/21/2019       Last A1C  Lab Results   Component Value Date    HGBA1C 6.5 (H) 03/21/2019       Last TSH  No results found for: TSH      Current Med Refills  Medication List with Changes/Refills   Current Medications    ALBUTEROL (VENTOLIN HFA) 90 MCG/ACTUATION INHALER    Inhale 2 puffs into the lungs every 6 (six) hours as needed for Wheezing or Shortness of Breath. Rescue       Start Date: 1/2/2019  End Date: --    AMITRIPTYLINE (ELAVIL) 100 MG TABLET           Start Date: 7/2/2018  End Date: --    AMLODIPINE (NORVASC) 10 MG TABLET    Take 1 tablet (10 mg total) by mouth once daily.       Start Date: 5/21/2020 End Date: --    ASCORBIC ACID (VITAMIN C) 500 MG TABLET    Take 500 mg by mouth 2 (two) times daily.       Start Date: --        End Date: --    ASPIRIN (ECOTRIN) 81 MG EC TABLET    Take 81 mg by mouth once daily.       Start Date: --        End Date: --    AZELASTINE (ASTELIN) 137 MCG (0.1 %) NASAL SPRAY    USE 1 SPRAY(S) IN EACH NOSTRIL TWICE DAILY       Start Date: 2/10/2020 End Date: --    BOOSTRIX TDAP 2.5-8-5 LF-MCG-LF/0.5ML SYRG INJECTION           Start Date: 10/23/2018End Date: --    CHLORTHALIDONE (HYGROTEN) 25 MG TAB    Take 1 tablet (25 mg total) by mouth  once daily.       Start Date: 1/13/2020 End Date: 1/12/2021    DOXEPIN (ZONALON) 5 % CREAM           Start Date: 7/2/2018  End Date: --    FISH OIL-OMEGA-3 FATTY ACIDS 300-1,000 MG CAPSULE    Take 2 g by mouth once daily.       Start Date: --        End Date: --    MULTIVITAMIN CAPSULE    Take 1 capsule by mouth once daily.       Start Date: --        End Date: --    OLMESARTAN (BENICAR) 40 MG TABLET    Take 1 tablet (40 mg total) by mouth once daily.       Start Date: 3/26/2020 End Date: 3/26/2021    ROSUVASTATIN (CRESTOR) 10 MG TABLET    Take 1 tablet (10 mg total) by mouth every evening.       Start Date: 5/21/2020 End Date: --    SUCRALFATE (CARAFATE) 1 GRAM TABLET    Take 1 tablet (1 g total) by mouth 4 (four) times daily before meals and nightly.       Start Date: 3/28/2019 End Date: --       Order(s) placed per written order guidelines    Please advise.

## 2020-07-15 ENCOUNTER — PATIENT MESSAGE (OUTPATIENT)
Dept: FAMILY MEDICINE | Facility: CLINIC | Age: 70
End: 2020-07-15

## 2020-07-16 RX ORDER — SUCRALFATE 1 G/1
1 TABLET ORAL
Qty: 360 TABLET | Refills: 2 | Status: SHIPPED | OUTPATIENT
Start: 2020-07-16

## 2020-08-11 ENCOUNTER — PATIENT OUTREACH (OUTPATIENT)
Dept: ADMINISTRATIVE | Facility: HOSPITAL | Age: 70
End: 2020-08-11

## 2020-09-11 ENCOUNTER — PATIENT OUTREACH (OUTPATIENT)
Dept: OTHER | Facility: OTHER | Age: 70
End: 2020-09-11

## 2020-10-02 NOTE — PROGRESS NOTES
Patient moved to [place] and has established care with a PCP outside of [facility]. Therefore, he is no longer eligible for the HDMP. Resolved digital medicine program episode.

## 2020-10-08 ENCOUNTER — PATIENT MESSAGE (OUTPATIENT)
Dept: PAIN MEDICINE | Facility: CLINIC | Age: 70
End: 2020-10-08

## 2023-01-09 NOTE — PROGRESS NOTES
Ongoing SW/CM Assessment/Plan of Care Note     See SW/CM flowsheets for goals and other objective data.    Progress note:  Planning dc today        Discharge plan:  Home     CM/SW team to continue to follow for discharge needs.       Health  Wellness Visit Note    Name: Luciano Henriquez Jr.  Clinic Number: [id number]  Physician: Juan Arteaga NP  Diagnosis: No diagnosis found.  Past Medical History:   Diagnosis Date    Allergy     Hyperlipidemia     Hypertension     Reflux esophagitis      Visit Number: 23  Precautions: DM      1st PT visit:  7/6/19  Year of care end date:  7/6/20  6 Month test  Performed:  (Due Jan)  12 Month test performed:   Mind body plan: 2 free visits  Patient level: level a    Time In: 10:30 am  Time Out: 11:30 am  Total Treatment Time: 60    Subjective:   Patient reports he is going for another steroid injection.  He saw Dr. Warner.  HE wants to continue wellness to maintain strength.   He is stretching at home.   Mild back pain today.  He felt better after stretching.    Objective:   Luciano completed therapeutic stretches (LTR, ham stretch, piriformis stretch, LAVELLE) and the following MedX exercise machines: core lumbar, torso rotation l/r, leg extension, leg curl, upright row, chest press, biceps curl, triceps extension, leg press, hip abd, and hip add    See exercise log in patient folder for rate of exertion and repetitions completed.     Fitness Machine Education Key:  E-education on equipment initiated and further follow up and education needed  I-independent with  and exercise    Discussed but set up teaching not yet started    Lumbarl Ext. E Torso Rotation E Leg Press E   Leg Extension E Seated Leg Curl E Chest Press E   Seated Row E Hip ADD E Hip ABD E   Triceps Extension E Bicep Curl E Other:        Assessment:   Patient tolerated visit very well and wants to continue wellness to maintain strength.    Plan:  Continue with established plan of care towards wellness goals.     For health  : Shira Logan, PT  11/25/2019

## 2024-07-14 NOTE — H&P (VIEW-ONLY)
Subjective:     Patient ID: Luciano Henriquez Jr. is a 69 y.o. male    Chief Complaint: Pain      Referred by: No ref. provider found      HPI:    Interval History (11/11/19):  He returns today for follow up.  He reports that caudal epidural steroid injection has not been helpful for the low back and lower extremity pain. He reports that got 1 day of near complete relief but after this day his pain gradually returned.  Currently he only reports 10-15% improvement from his baseline.  He denies any changes in the quality or location of his pain.  He denies any new or worsening symptoms.      Interval History (9/27/19):  He returns today for follow up and MRI review.  He reports that his pain is unchanged in quality location since last encounter.  He denies any new or worsening symptoms.      Initial Encounter (9/13/19):  Luciano Henriquez Jr. is a 69 y.o. male who presents today with chronic bilateral low back pain. Onset of pain was gradual without any specific inciting events or injuries.  Patient localizes the pain across the lower lumbar region.  The pain does radiate to the bilateral buttocks and thighs.  He denies any associated numbness, tingling, weakness, bowel bladder dysfunction.  The pain is intermittent and worsened with activity.  Patient states that he can walk 50-60 yd before the pain becomes bothersome.  The pain typically improves after sitting for 1-2 minutes.   This pain is described in detail below.    Physical Therapy:  Yes.  Somewhat helpful.    Non-pharmacologic Treatment:  Rest helps         · TENS?  No    Pain Medications:         · Currently taking:  Ibuprofen, tizanidine    · Has tried in the past:      · Has not tried:  Opioids, , Tylenol, TCAs, SNRIs, anticonvulsants, topical creams    Blood thinners:  Aspirin    Interventional Therapies:    10/23/19 - caudal epidural steroid injection - 1 day of near complete relief    Relevant Surgeries:  None    Affecting sleep?  No    Affecting  Confidential Drug Utilization Report  Search Terms: Nissa Rodriges, 1989Search Date: 07/14/2024 21:18:21 PM  Searching on behalf of: Myself  The Drug Utilization Report below displays all of the controlled substance prescriptions, if any, that your patient has filled in the last twelve months. The information displayed on this report is compiled from pharmacy submissions to the Department, and accurately reflects the information as submitted by the pharmacies.    This report was requested by: Izzy Zuniga | Reference #: [id number]    Practitioner Count: 3  Pharmacy Count: 1  Current Opioid Prescriptions: 1  Current Benzodiazepine Prescriptions: 0  Current Stimulant Prescriptions: 0      Patient Demographic Information (PDI)       PDI	First Name	Last Name	Birth Date	Gender	Street Address	[facility] Code  A	Nissa Rodriges	1989	Female	[address]	[address]  B	Nissa Rodriges	1989	Female	[address]	[address]    Prescription Information      PDI Filter:    PDI	My Rx	Current Rx	Drug Type	Rx Written	Rx Dispensed	Drug	Quantity	Days Supply	Prescriber Name	Prescriber NOEMÍ #	Payment Method	Dispenser  A	N	Y	O	07/03/2024	07/07/2024	buprenorphine-naloxone 4-1 mg sl film	10	10	Laverne Cruz	[id number]	Medicaid	[id number]  A	N	N	O	06/06/2024	06/07/2024	buprenorphine-naloxone 4-1 mg sl film	30	30	Margot Aguillon	[id number]	[id number]  A	N	N	O	05/31/2024	05/31/2024	buprenorphine-naloxone 4-1 mg sl film	7	7	Isidro Mccormick ([place])	[id number]	Medicaid	[id number]  A	N	N	O	04/23/2024	04/25/2024	buprenorphine-naloxone 4-1 mg sl film	30	30	Laverne Cruz	[id number]	Medicaid	[id number]  A	N	N	O	03/26/2024	03/27/2024	buprenorphine-naloxone 4-1 mg sl film	30	30	Laverne Cruz	[id number]	Medicaid	[id number]  A	N	N	O	02/27/2024	02/28/2024	buprenorphine-naloxone 4-1 mg sl film	30	30	Laverne Cruz	[id number]	Medicaid	[id number]  A	N	N	O	01/30/2024	01/30/2024	buprenorphine-naloxone 4-1 mg sl film	30	30	Laverne Cruz	[id number]	Medicaid	[id number]  A	N	N	O	12/30/2023	12/30/2023	buprenorphine-naloxone 4-1 mg sl film	28	28	Laverne Cruz	[id number]	Medicaid	[id number]  A	N	N	O	12/02/2023	12/02/2023	buprenorphine-naloxone 4-1 mg sl film	28	28	Laverne Cruz	[id number]	Medicaid	[id number]  A	N	N	O	11/03/2023	11/03/2023	buprenorphine-naloxone 4-1 mg sl film	28	28	Kathy Jamisonette	[id number]	Medicaid	[id number]  A	N	N	O	10/04/2023	10/06/2023	buprenorphine-naloxone 4-1 mg sl film	28	28	Laverne Cruz	[id number]	Medicaid	[id number]  A	N	N	O	09/20/2023	09/21/2023	buprenorphine-naloxone 4-1 mg sl film	14	14	Alison De Paz	[id number]	Medicaid	[id number]  A	N	N	O	09/06/2023	09/09/2023	buprenorphine-naloxone 4-1 mg sl film	14	14	Alison eD Paz	[id number]	Medicaid	[id number]  A	N	N	O	08/22/2023	08/24/2023	buprenorphine-naloxone 4-1 mg sl film	14	14	ChidiArmond elliotta	[id number]	Medicaid	[id number]  B	N	N	O	07/25/2023	07/25/2023	buprenorphine-naloxone 4-1 mg sl film	30	30	Irene Ferrell	[id number]	Medicaid	[id number]    * - Details of Drug Type : O = Opioid, B = Benzodiazepine, S = Stimulant    * - Drugs marked with an asterisk are compound drugs. If the compound drug is made up of more than one controlled substance, then each controlled substance will be a separate row in the table. daily activities? yes    Depressive symptoms? no          · SI/HI? No    Work status: Retried    Pain Scores:    Best:       2/10  Worst:    8/10  Usually:   4/10  Today:  4/10    Review of Systems   Constitutional: Negative for activity change, appetite change, chills, fatigue, fever and unexpected weight change.   HENT: Negative for hearing loss.    Eyes: Negative for visual disturbance.   Respiratory: Negative for chest tightness and shortness of breath.    Cardiovascular: Negative for chest pain.   Gastrointestinal: Negative for abdominal pain, constipation, diarrhea, nausea and vomiting.   Genitourinary: Negative for difficulty urinating.   Musculoskeletal: Positive for arthralgias, back pain, gait problem and myalgias. Negative for neck pain.   Skin: Negative for rash.   Neurological: Negative for dizziness, weakness, light-headedness, numbness and headaches.   Psychiatric/Behavioral: Negative for hallucinations, sleep disturbance and suicidal ideas. The patient is not nervous/anxious.        Past Medical History:   Diagnosis Date    Allergy     Hyperlipidemia     Hypertension     Reflux esophagitis        Past Surgical History:   Procedure Laterality Date    arthroscopic on left knee      CARPAL TUNNEL RELEASE      right hand only    deviated septum repair      EPIDURAL STEROID INJECTION N/A 10/23/2019    Procedure: Injection, Steroid, Epidural Caudal;  Surgeon: Jorge Warner Jr., MD;  Location: [facility];  Service: Pain Management;  Laterality: N/A;  Caudal BETO    [id number]    Arrive @ 0800 (morning request); IV Sedation- Fasting; ASA last 10/15; No DM    HERNIA REPAIR Left     inguinal    VASECTOMY         Social History     Socioeconomic History    Marital status:      Spouse name: Not on file    Number of children: Not on file    Years of education: Not on file    Highest education level: Not on file   Occupational History    Not on file   Social Needs    Financial resource  strain: Not on file    Food insecurity:     Worry: Not on file     Inability: Not on file    Transportation needs:     Medical: Not on file     Non-medical: Not on file   Tobacco Use    Smoking status: Never Smoker    Smokeless tobacco: Never Used    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     Substance and Sexual Activity    Alcohol use: Yes     Alcohol/week: 14.0 standard drinks     Types: 14 Glasses of wine per week     Comment: Drinks 2 glasses of wine per night    Drug use: No    Sexual activity: Yes     Partners: Female   Lifestyle    Physical activity:     Days per week: Not on file     Minutes per session: Not on file    Stress: Not on file   Relationships    Social connections:     Talks on phone: Not on file     Gets together: Not on file     Attends Church service: Not on file     Active member of club or organization: Not on file     Attends meetings of clubs or organizations: Not on file     Relationship status: Not on file   Other Topics Concern    Not on file   Social History Narrative    Not on file       Review of patient's allergies indicates:  No Known Allergies    Current Outpatient Medications on File Prior to Visit   Medication Sig Dispense Refill    albuterol (VENTOLIN HFA) 90 mcg/actuation inhaler Inhale 2 puffs into the lungs every 6 (six) hours as needed for Wheezing or Shortness of Breath. Rescue 18 g 0    amitriptyline (ELAVIL) 100 MG tablet       amLODIPine (NORVASC) 10 MG tablet Take 1 tablet (10 mg total) by mouth once daily. 90 tablet 3    ascorbic acid (VITAMIN C) 500 MG tablet Take 500 mg by mouth 2 (two) times daily.      aspirin (ECOTRIN) 81 MG EC tablet Take 81 mg by mouth once daily.      azelastine (ASTELIN) 137 mcg (0.1 %) nasal spray 1 spray (137 mcg total) by Nasal route 2 (two) times daily. 30 mL 1    BOOSTRIX TDAP 2.5-8-5 Lf-mcg-Lf/0.5mL Syrg injection       chlorthalidone (HYGROTEN) 25 MG Tab Take 1 tablet (25 mg total)  "by mouth once daily. 90 tablet 3    doxepin (ZONALON) 5 % cream       fish oil-omega-3 fatty acids 300-1,000 mg capsule Take 2 g by mouth once daily.      multivitamin capsule Take 1 capsule by mouth once daily.      olmesartan (BENICAR) 40 MG tablet Take 1 tablet (40 mg total) by mouth once daily. 90 tablet 3    rosuvastatin (CRESTOR) 10 MG tablet Take 1 tablet (10 mg total) by mouth every evening. 90 tablet 3    sucralfate (CARAFATE) 1 gram tablet Take 1 tablet (1 g total) by mouth 4 (four) times daily before meals and nightly. 360 tablet 2    [DISCONTINUED] tiZANidine (ZANAFLEX) 4 MG tablet Take 1 tablet (4 mg total) by mouth every 8 (eight) hours as needed. 30 tablet 1     No current facility-administered medications on file prior to visit.        Objective:      /69   Pulse 69   Temp 98.4 °F (36.9 °C) (Oral)   Ht 5' 5" (1.651 m)   Wt 93.2 kg (205 lb 8 oz)   SpO2 96%   BMI 34.20 kg/m²     Exam:  GEN:  Well developed, well nourished.  No acute distress.  Normal pain behavior.  HEENT:  No trauma.  Mucous membranes moist.  Nares patent bilaterally.  PSYCH: Normal affect. Thought content appropriate.  CHEST:  Breathing symmetric.  No audible wheezing.  ABD: Soft, non-distended.  SKIN:  Warm, pink, dry.  No rash on exposed areas.    EXT:  No cyanosis, clubbing, or edema.  No color change or changes in nail or hair growth.  NEURO/MUSCULOSKELETAL:  Fully alert, oriented, and appropriate. Speech normal yair. No cranial nerve deficits.   Gait:  Normal.  No trendelenburg sign bilaterally.   Motor Strength: 5/5 motor strength throughout lower extremities.   Sensory:  No sensory deficit in the lower extremities.   Reflexes:  2 + and symmetric throughout.  Downgoing Babinski's bilaterally.  No clonus or spasticity.  L-Spine:  Limited ROM with pain on extension. Negative pain with axial/facet loading bilaterally.  Negative SLR bilaterally.    No TTP over lumbar paraspinals, bilateral SI joints, hips, " piriformis muscles, or GTB.            Imaging:  Outside lumbar MRI done on 9/24/19:    L2-3:  Mild spinal central stenosis secondary to disc bulge and facet and ligamentum flavum hypertrophy.  Right greater than left moderate foraminal narrowing.    L3-4:  Moderate central stenosis secondary to disc bulge and facet and ligamentum flavum hypertrophy.  Moderate right and moderate to severe left foraminal narrowing with probable but min of both exiting L3 nerve roots.    L4-5:  Mild central stenosis secondary to disc bulge and facet and ligamentum flavum hypertrophy.  Moderate to severe bilateral foraminal narrowing with probable abutment of both exiting L4 nerve roots.    L5-S1:  Slight indentation of the thecal sac secondary to disc bulge.  Conceivable impingement of the transitioning S1 nerve roots.  Moderate to severe left> right foraminal stenosis        Assessment:       Encounter Diagnoses   Name Primary?    DDD (degenerative disc disease), lumbar Yes    Lumbar spondylosis     Spinal stenosis of lumbar region with neurogenic claudication     Lumbar radiculopathy          Plan:       Luciano was seen today for pain.    Diagnoses and all orders for this visit:    DDD (degenerative disc disease), lumbar  -     tiZANidine (ZANAFLEX) 4 MG tablet; Take 1 tablet (4 mg total) by mouth every 8 (eight) hours as needed.  -     Case request GI: Injection, Steroid, Epidural Caudal    Lumbar spondylosis  -     tiZANidine (ZANAFLEX) 4 MG tablet; Take 1 tablet (4 mg total) by mouth every 8 (eight) hours as needed.    Spinal stenosis of lumbar region with neurogenic claudication  -     tiZANidine (ZANAFLEX) 4 MG tablet; Take 1 tablet (4 mg total) by mouth every 8 (eight) hours as needed.    Lumbar radiculopathy  -     Case request GI: Injection, Steroid, Epidural Caudal        Luciano Delarosadeysi Gonzalez. is a 69 y.o. male with chronic bilateral low back pain that radiates the bilateral lower extremities.  Exact etiology of pain  of unknown though I do have suspicion for neurogenic claudication/spinal stenosis.  Low suspicion for facet or sacroiliac mediated pain. Low suspicion for radiculopathy.  Disc space narrowing noted on lumbar x-rays with significant lower lumbar facet arthritis.  Multilevel foraminal and central stenosis noted on lumbar MRI.  Good relief for 1 day following caudal epidural steroid injection.    1.  Schedule for repeat caudal epidural steroid injection.  2.  Continue physical therapy/home exercise program.  3.  Return to clinic 2 weeks after procedure to discuss results.  If limited relief, will consider neurosurgery referral.  Patient may be a good candidate for spinal cord stimulator.